# Patient Record
Sex: FEMALE | Race: WHITE | Employment: OTHER | URBAN - METROPOLITAN AREA
[De-identification: names, ages, dates, MRNs, and addresses within clinical notes are randomized per-mention and may not be internally consistent; named-entity substitution may affect disease eponyms.]

---

## 2020-06-09 ENCOUNTER — APPOINTMENT (OUTPATIENT)
Dept: GENERAL RADIOLOGY | Age: 85
DRG: 563 | End: 2020-06-09
Payer: MEDICARE

## 2020-06-09 ENCOUNTER — APPOINTMENT (OUTPATIENT)
Dept: CT IMAGING | Age: 85
DRG: 563 | End: 2020-06-09
Payer: MEDICARE

## 2020-06-09 ENCOUNTER — HOSPITAL ENCOUNTER (INPATIENT)
Age: 85
LOS: 2 days | Discharge: NURSING, REHAB OR LONG-TERM CARE | DRG: 563 | End: 2020-06-12
Attending: INTERNAL MEDICINE | Admitting: INTERNAL MEDICINE
Payer: MEDICARE

## 2020-06-09 PROCEDURE — 73562 X-RAY EXAM OF KNEE 3: CPT

## 2020-06-09 PROCEDURE — 99285 EMERGENCY DEPT VISIT HI MDM: CPT

## 2020-06-09 PROCEDURE — 73700 CT LOWER EXTREMITY W/O DYE: CPT

## 2020-06-09 ASSESSMENT — ENCOUNTER SYMPTOMS
ABDOMINAL PAIN: 0
VOMITING: 0
SHORTNESS OF BREATH: 0
NAUSEA: 0

## 2020-06-09 ASSESSMENT — PAIN SCALES - GENERAL: PAINLEVEL_OUTOF10: 8

## 2020-06-10 ENCOUNTER — APPOINTMENT (OUTPATIENT)
Dept: CT IMAGING | Age: 85
DRG: 563 | End: 2020-06-10
Payer: MEDICARE

## 2020-06-10 PROBLEM — S82.121A CLOSED FRACTURE OF LATERAL PORTION OF RIGHT TIBIAL PLATEAU: Status: ACTIVE | Noted: 2020-06-10

## 2020-06-10 PROBLEM — T14.8XXA FRACTURE: Status: ACTIVE | Noted: 2020-06-10

## 2020-06-10 LAB
A/G RATIO: 1.1 (ref 1.1–2.2)
ALBUMIN SERPL-MCNC: 3.5 G/DL (ref 3.4–5)
ALP BLD-CCNC: 110 U/L (ref 40–129)
ALT SERPL-CCNC: 23 U/L (ref 10–40)
ANION GAP SERPL CALCULATED.3IONS-SCNC: 11 MMOL/L (ref 3–16)
AST SERPL-CCNC: 22 U/L (ref 15–37)
BASOPHILS ABSOLUTE: 0.1 K/UL (ref 0–0.2)
BASOPHILS RELATIVE PERCENT: 1 %
BILIRUB SERPL-MCNC: <0.2 MG/DL (ref 0–1)
BUN BLDV-MCNC: 22 MG/DL (ref 7–20)
CALCIUM SERPL-MCNC: 8.4 MG/DL (ref 8.3–10.6)
CHLORIDE BLD-SCNC: 102 MMOL/L (ref 99–110)
CO2: 26 MMOL/L (ref 21–32)
CREAT SERPL-MCNC: 1.6 MG/DL (ref 0.6–1.2)
EKG ATRIAL RATE: 63 BPM
EKG DIAGNOSIS: NORMAL
EKG P AXIS: 90 DEGREES
EKG P-R INTERVAL: 214 MS
EKG Q-T INTERVAL: 494 MS
EKG QRS DURATION: 90 MS
EKG QTC CALCULATION (BAZETT): 505 MS
EKG R AXIS: -44 DEGREES
EKG T AXIS: 47 DEGREES
EKG VENTRICULAR RATE: 63 BPM
EOSINOPHILS ABSOLUTE: 0.1 K/UL (ref 0–0.6)
EOSINOPHILS RELATIVE PERCENT: 1.7 %
ESTIMATED AVERAGE GLUCOSE: 177.2 MG/DL
GFR AFRICAN AMERICAN: 37
GFR NON-AFRICAN AMERICAN: 31
GLOBULIN: 3.2 G/DL
GLUCOSE BLD-MCNC: 182 MG/DL (ref 70–99)
GLUCOSE BLD-MCNC: 197 MG/DL (ref 70–99)
GLUCOSE BLD-MCNC: 205 MG/DL (ref 70–99)
GLUCOSE BLD-MCNC: 208 MG/DL (ref 70–99)
GLUCOSE BLD-MCNC: 237 MG/DL (ref 70–99)
GLUCOSE BLD-MCNC: 244 MG/DL (ref 70–99)
HBA1C MFR BLD: 7.8 %
HCT VFR BLD CALC: 31.6 % (ref 36–48)
HEMOGLOBIN: 10 G/DL (ref 12–16)
INR BLD: 1.15 (ref 0.86–1.14)
LYMPHOCYTES ABSOLUTE: 0.6 K/UL (ref 1–5.1)
LYMPHOCYTES RELATIVE PERCENT: 8.8 %
MCH RBC QN AUTO: 24.7 PG (ref 26–34)
MCHC RBC AUTO-ENTMCNC: 31.5 G/DL (ref 31–36)
MCV RBC AUTO: 78.4 FL (ref 80–100)
MONOCYTES ABSOLUTE: 0.6 K/UL (ref 0–1.3)
MONOCYTES RELATIVE PERCENT: 9.6 %
NEUTROPHILS ABSOLUTE: 5.2 K/UL (ref 1.7–7.7)
NEUTROPHILS RELATIVE PERCENT: 78.9 %
PDW BLD-RTO: 16.3 % (ref 12.4–15.4)
PERFORMED ON: ABNORMAL
PLATELET # BLD: 228 K/UL (ref 135–450)
PMV BLD AUTO: 9.3 FL (ref 5–10.5)
POTASSIUM REFLEX MAGNESIUM: 4.4 MMOL/L (ref 3.5–5.1)
PROTHROMBIN TIME: 13.4 SEC (ref 10–13.2)
RBC # BLD: 4.03 M/UL (ref 4–5.2)
SODIUM BLD-SCNC: 139 MMOL/L (ref 136–145)
TOTAL PROTEIN: 6.7 G/DL (ref 6.4–8.2)
WBC # BLD: 6.7 K/UL (ref 4–11)

## 2020-06-10 PROCEDURE — 6370000000 HC RX 637 (ALT 250 FOR IP): Performed by: FAMILY MEDICINE

## 2020-06-10 PROCEDURE — 2580000003 HC RX 258: Performed by: INTERNAL MEDICINE

## 2020-06-10 PROCEDURE — 6360000002 HC RX W HCPCS: Performed by: INTERNAL MEDICINE

## 2020-06-10 PROCEDURE — 83036 HEMOGLOBIN GLYCOSYLATED A1C: CPT

## 2020-06-10 PROCEDURE — 85025 COMPLETE CBC W/AUTO DIFF WBC: CPT

## 2020-06-10 PROCEDURE — 93010 ELECTROCARDIOGRAM REPORT: CPT | Performed by: INTERNAL MEDICINE

## 2020-06-10 PROCEDURE — 70450 CT HEAD/BRAIN W/O DYE: CPT

## 2020-06-10 PROCEDURE — 6360000002 HC RX W HCPCS: Performed by: PHYSICIAN ASSISTANT

## 2020-06-10 PROCEDURE — 96375 TX/PRO/DX INJ NEW DRUG ADDON: CPT

## 2020-06-10 PROCEDURE — 36415 COLL VENOUS BLD VENIPUNCTURE: CPT

## 2020-06-10 PROCEDURE — 6370000000 HC RX 637 (ALT 250 FOR IP): Performed by: INTERNAL MEDICINE

## 2020-06-10 PROCEDURE — 1200000000 HC SEMI PRIVATE

## 2020-06-10 PROCEDURE — 93005 ELECTROCARDIOGRAM TRACING: CPT | Performed by: INTERNAL MEDICINE

## 2020-06-10 PROCEDURE — 99223 1ST HOSP IP/OBS HIGH 75: CPT | Performed by: NURSE PRACTITIONER

## 2020-06-10 PROCEDURE — 96374 THER/PROPH/DIAG INJ IV PUSH: CPT

## 2020-06-10 PROCEDURE — 85610 PROTHROMBIN TIME: CPT

## 2020-06-10 PROCEDURE — 80053 COMPREHEN METABOLIC PANEL: CPT

## 2020-06-10 RX ORDER — ACETAMINOPHEN 325 MG/1
650 TABLET ORAL EVERY 6 HOURS PRN
Status: DISCONTINUED | OUTPATIENT
Start: 2020-06-10 | End: 2020-06-12 | Stop reason: HOSPADM

## 2020-06-10 RX ORDER — HYDROCHLOROTHIAZIDE 25 MG/1
25 TABLET ORAL DAILY
Status: DISCONTINUED | OUTPATIENT
Start: 2020-06-10 | End: 2020-06-10

## 2020-06-10 RX ORDER — OXYCODONE HYDROCHLORIDE 5 MG/1
5 TABLET ORAL EVERY 6 HOURS PRN
Status: DISCONTINUED | OUTPATIENT
Start: 2020-06-10 | End: 2020-06-10

## 2020-06-10 RX ORDER — NICOTINE POLACRILEX 4 MG
15 LOZENGE BUCCAL PRN
Status: DISCONTINUED | OUTPATIENT
Start: 2020-06-10 | End: 2020-06-12 | Stop reason: HOSPADM

## 2020-06-10 RX ORDER — ATORVASTATIN CALCIUM 40 MG/1
40 TABLET, FILM COATED ORAL DAILY
Status: DISCONTINUED | OUTPATIENT
Start: 2020-06-10 | End: 2020-06-12 | Stop reason: HOSPADM

## 2020-06-10 RX ORDER — OXYCODONE HYDROCHLORIDE 5 MG/1
5 TABLET ORAL EVERY 4 HOURS PRN
Status: DISCONTINUED | OUTPATIENT
Start: 2020-06-10 | End: 2020-06-12 | Stop reason: HOSPADM

## 2020-06-10 RX ORDER — OXYCODONE HYDROCHLORIDE 5 MG/1
10 TABLET ORAL EVERY 4 HOURS PRN
Status: DISCONTINUED | OUTPATIENT
Start: 2020-06-10 | End: 2020-06-12 | Stop reason: HOSPADM

## 2020-06-10 RX ORDER — ONDANSETRON 2 MG/ML
4 INJECTION INTRAMUSCULAR; INTRAVENOUS EVERY 6 HOURS PRN
Status: DISCONTINUED | OUTPATIENT
Start: 2020-06-10 | End: 2020-06-12 | Stop reason: HOSPADM

## 2020-06-10 RX ORDER — PROMETHAZINE HYDROCHLORIDE 25 MG/1
12.5 TABLET ORAL EVERY 6 HOURS PRN
Status: DISCONTINUED | OUTPATIENT
Start: 2020-06-10 | End: 2020-06-12 | Stop reason: HOSPADM

## 2020-06-10 RX ORDER — MORPHINE SULFATE 2 MG/ML
2 INJECTION, SOLUTION INTRAMUSCULAR; INTRAVENOUS
Status: COMPLETED | OUTPATIENT
Start: 2020-06-10 | End: 2020-06-10

## 2020-06-10 RX ORDER — DEXTROSE MONOHYDRATE 25 G/50ML
12.5 INJECTION, SOLUTION INTRAVENOUS PRN
Status: DISCONTINUED | OUTPATIENT
Start: 2020-06-10 | End: 2020-06-12 | Stop reason: HOSPADM

## 2020-06-10 RX ORDER — GLIPIZIDE 5 MG/1
5 TABLET ORAL
Status: DISCONTINUED | OUTPATIENT
Start: 2020-06-11 | End: 2020-06-12 | Stop reason: HOSPADM

## 2020-06-10 RX ORDER — SODIUM CHLORIDE 0.9 % (FLUSH) 0.9 %
10 SYRINGE (ML) INJECTION PRN
Status: DISCONTINUED | OUTPATIENT
Start: 2020-06-10 | End: 2020-06-12 | Stop reason: HOSPADM

## 2020-06-10 RX ORDER — POLYETHYLENE GLYCOL 3350 17 G/17G
17 POWDER, FOR SOLUTION ORAL DAILY PRN
Status: DISCONTINUED | OUTPATIENT
Start: 2020-06-10 | End: 2020-06-12 | Stop reason: HOSPADM

## 2020-06-10 RX ORDER — ONDANSETRON 2 MG/ML
4 INJECTION INTRAMUSCULAR; INTRAVENOUS ONCE
Status: COMPLETED | OUTPATIENT
Start: 2020-06-10 | End: 2020-06-10

## 2020-06-10 RX ORDER — HYDRALAZINE HYDROCHLORIDE 20 MG/ML
10 INJECTION INTRAMUSCULAR; INTRAVENOUS EVERY 6 HOURS PRN
Status: DISCONTINUED | OUTPATIENT
Start: 2020-06-10 | End: 2020-06-12 | Stop reason: HOSPADM

## 2020-06-10 RX ORDER — SODIUM CHLORIDE 0.9 % (FLUSH) 0.9 %
10 SYRINGE (ML) INJECTION EVERY 12 HOURS SCHEDULED
Status: DISCONTINUED | OUTPATIENT
Start: 2020-06-10 | End: 2020-06-12 | Stop reason: HOSPADM

## 2020-06-10 RX ORDER — SENNA PLUS 8.6 MG/1
1 TABLET ORAL NIGHTLY
Status: DISCONTINUED | OUTPATIENT
Start: 2020-06-10 | End: 2020-06-12 | Stop reason: HOSPADM

## 2020-06-10 RX ORDER — LATANOPROST 50 UG/ML
1 SOLUTION/ DROPS OPHTHALMIC NIGHTLY
Status: DISCONTINUED | OUTPATIENT
Start: 2020-06-10 | End: 2020-06-12 | Stop reason: HOSPADM

## 2020-06-10 RX ORDER — MORPHINE SULFATE 4 MG/ML
4 INJECTION, SOLUTION INTRAMUSCULAR; INTRAVENOUS ONCE
Status: COMPLETED | OUTPATIENT
Start: 2020-06-10 | End: 2020-06-10

## 2020-06-10 RX ORDER — INSULIN LISPRO 100 [IU]/ML
0-3 INJECTION, SOLUTION INTRAVENOUS; SUBCUTANEOUS NIGHTLY
Status: DISCONTINUED | OUTPATIENT
Start: 2020-06-10 | End: 2020-06-12 | Stop reason: HOSPADM

## 2020-06-10 RX ORDER — DEXTROSE MONOHYDRATE 50 MG/ML
100 INJECTION, SOLUTION INTRAVENOUS PRN
Status: DISCONTINUED | OUTPATIENT
Start: 2020-06-10 | End: 2020-06-12 | Stop reason: HOSPADM

## 2020-06-10 RX ORDER — AZELASTINE HYDROCHLORIDE 0.5 MG/ML
1 SOLUTION/ DROPS OPHTHALMIC 2 TIMES DAILY
Status: DISCONTINUED | OUTPATIENT
Start: 2020-06-10 | End: 2020-06-10

## 2020-06-10 RX ORDER — ACETAMINOPHEN 650 MG/1
650 SUPPOSITORY RECTAL EVERY 6 HOURS PRN
Status: DISCONTINUED | OUTPATIENT
Start: 2020-06-10 | End: 2020-06-12 | Stop reason: HOSPADM

## 2020-06-10 RX ORDER — INSULIN LISPRO 100 [IU]/ML
0-6 INJECTION, SOLUTION INTRAVENOUS; SUBCUTANEOUS
Status: DISCONTINUED | OUTPATIENT
Start: 2020-06-10 | End: 2020-06-12 | Stop reason: HOSPADM

## 2020-06-10 RX ORDER — CITALOPRAM 20 MG/1
10 TABLET ORAL DAILY
Status: DISCONTINUED | OUTPATIENT
Start: 2020-06-10 | End: 2020-06-10

## 2020-06-10 RX ORDER — DOCUSATE SODIUM 100 MG/1
100 CAPSULE, LIQUID FILLED ORAL DAILY
Status: DISCONTINUED | OUTPATIENT
Start: 2020-06-10 | End: 2020-06-12 | Stop reason: HOSPADM

## 2020-06-10 RX ORDER — ALOGLIPTIN 6.25 MG/1
6.25 TABLET, FILM COATED ORAL DAILY
Status: DISCONTINUED | OUTPATIENT
Start: 2020-06-10 | End: 2020-06-10

## 2020-06-10 RX ORDER — AMLODIPINE BESYLATE 5 MG/1
5 TABLET ORAL DAILY
Status: DISCONTINUED | OUTPATIENT
Start: 2020-06-10 | End: 2020-06-12 | Stop reason: HOSPADM

## 2020-06-10 RX ADMIN — Medication 10 ML: at 11:34

## 2020-06-10 RX ADMIN — MORPHINE SULFATE 2 MG: 2 INJECTION, SOLUTION INTRAMUSCULAR; INTRAVENOUS at 11:34

## 2020-06-10 RX ADMIN — MORPHINE SULFATE 4 MG: 4 INJECTION INTRAVENOUS at 01:17

## 2020-06-10 RX ADMIN — Medication 10 ML: at 08:00

## 2020-06-10 RX ADMIN — ENOXAPARIN SODIUM 30 MG: 30 INJECTION SUBCUTANEOUS at 11:34

## 2020-06-10 RX ADMIN — INSULIN LISPRO 2 UNITS: 100 INJECTION, SOLUTION INTRAVENOUS; SUBCUTANEOUS at 12:38

## 2020-06-10 RX ADMIN — LINAGLIPTIN 5 MG: 5 TABLET, FILM COATED ORAL at 16:51

## 2020-06-10 RX ADMIN — HYDRALAZINE HYDROCHLORIDE 10 MG: 20 INJECTION INTRAMUSCULAR; INTRAVENOUS at 10:07

## 2020-06-10 RX ADMIN — HYDRALAZINE HYDROCHLORIDE 10 MG: 20 INJECTION INTRAMUSCULAR; INTRAVENOUS at 21:09

## 2020-06-10 RX ADMIN — DOCUSATE SODIUM 100 MG: 100 CAPSULE ORAL at 16:51

## 2020-06-10 RX ADMIN — OXYCODONE 5 MG: 5 TABLET ORAL at 08:00

## 2020-06-10 RX ADMIN — INSULIN LISPRO 1 UNITS: 100 INJECTION, SOLUTION INTRAVENOUS; SUBCUTANEOUS at 21:09

## 2020-06-10 RX ADMIN — AMLODIPINE BESYLATE 5 MG: 5 TABLET ORAL at 07:58

## 2020-06-10 RX ADMIN — ONDANSETRON 4 MG: 2 INJECTION INTRAMUSCULAR; INTRAVENOUS at 01:17

## 2020-06-10 RX ADMIN — SENNOSIDES 8.6 MG: 8.6 TABLET, FILM COATED ORAL at 20:56

## 2020-06-10 RX ADMIN — OXYCODONE 5 MG: 5 TABLET ORAL at 02:58

## 2020-06-10 RX ADMIN — OXYCODONE 5 MG: 5 TABLET ORAL at 14:57

## 2020-06-10 RX ADMIN — OXYCODONE 10 MG: 5 TABLET ORAL at 21:22

## 2020-06-10 RX ADMIN — Medication 10 ML: at 10:08

## 2020-06-10 RX ADMIN — MORPHINE SULFATE 2 MG: 2 INJECTION, SOLUTION INTRAMUSCULAR; INTRAVENOUS at 06:20

## 2020-06-10 RX ADMIN — APIXABAN 2.5 MG: 2.5 TABLET, FILM COATED ORAL at 20:56

## 2020-06-10 RX ADMIN — INSULIN LISPRO 2 UNITS: 100 INJECTION, SOLUTION INTRAVENOUS; SUBCUTANEOUS at 16:52

## 2020-06-10 RX ADMIN — INSULIN LISPRO 1 UNITS: 100 INJECTION, SOLUTION INTRAVENOUS; SUBCUTANEOUS at 07:59

## 2020-06-10 RX ADMIN — DESMOPRESSIN ACETATE 40 MG: 0.2 TABLET ORAL at 07:59

## 2020-06-10 RX ADMIN — LATANOPROST 1 DROP: 50 SOLUTION OPHTHALMIC at 20:56

## 2020-06-10 RX ADMIN — Medication 10 ML: at 21:00

## 2020-06-10 RX ADMIN — HYDRALAZINE HYDROCHLORIDE 10 MG: 20 INJECTION INTRAMUSCULAR; INTRAVENOUS at 02:58

## 2020-06-10 ASSESSMENT — PAIN SCALES - GENERAL
PAINLEVEL_OUTOF10: 10
PAINLEVEL_OUTOF10: 10
PAINLEVEL_OUTOF10: 3
PAINLEVEL_OUTOF10: 4
PAINLEVEL_OUTOF10: 10
PAINLEVEL_OUTOF10: 3
PAINLEVEL_OUTOF10: 2
PAINLEVEL_OUTOF10: 8
PAINLEVEL_OUTOF10: 2
PAINLEVEL_OUTOF10: 2
PAINLEVEL_OUTOF10: 4
PAINLEVEL_OUTOF10: 3
PAINLEVEL_OUTOF10: 7
PAINLEVEL_OUTOF10: 10
PAINLEVEL_OUTOF10: 6
PAINLEVEL_OUTOF10: 5

## 2020-06-10 ASSESSMENT — PAIN DESCRIPTION - PAIN TYPE
TYPE: ACUTE PAIN

## 2020-06-10 ASSESSMENT — PAIN DESCRIPTION - LOCATION
LOCATION: KNEE

## 2020-06-10 ASSESSMENT — PAIN DESCRIPTION - PROGRESSION
CLINICAL_PROGRESSION: NOT CHANGED
CLINICAL_PROGRESSION: NOT CHANGED
CLINICAL_PROGRESSION: GRADUALLY IMPROVING
CLINICAL_PROGRESSION: GRADUALLY IMPROVING
CLINICAL_PROGRESSION: NOT CHANGED

## 2020-06-10 ASSESSMENT — PAIN DESCRIPTION - ORIENTATION
ORIENTATION: RIGHT

## 2020-06-10 ASSESSMENT — PAIN DESCRIPTION - FREQUENCY
FREQUENCY: CONTINUOUS

## 2020-06-10 ASSESSMENT — PAIN DESCRIPTION - DESCRIPTORS
DESCRIPTORS: SHARP;SPASM;THROBBING
DESCRIPTORS: ACHING

## 2020-06-10 ASSESSMENT — PAIN DESCRIPTION - ONSET: ONSET: ON-GOING

## 2020-06-10 ASSESSMENT — PAIN - FUNCTIONAL ASSESSMENT: PAIN_FUNCTIONAL_ASSESSMENT: PREVENTS OR INTERFERES WITH ALL ACTIVE AND SOME PASSIVE ACTIVITIES

## 2020-06-10 NOTE — ED PROVIDER NOTES
I did not participate in the care of this patient. I did interpret the 12-lead EKG as follows:    Normal sinus rhythm, W[place] interval QRS QTC normal.  Normal axis.   No acute ischemic changes     Dallas Gutierrez MD  06/10/20 9701

## 2020-06-10 NOTE — CARE COORDINATION
Discharge Planning Assessment  Rn/SW discharge planner met w/patient/family member to discuss reason for admission, current living situation, and potential needs at the time of discharge. Demographics/Insurance verified Yes    Current type of dwelling: quad-level home    Living arrangements: w/spouse    Level of function/support: independent w/all ADL's    PCP: Livan    Last Visit to PCP: stated 2 weeks ago    DME: uses a walker and cane at home    Active with any community resources/agencies/skilled home care: stated no services in the home    Medication compliance issues: stated no issues    Financial issues that could impact healthcare: stated no issues    Transportation at time of d/c (Discussed w/pt/family that on the day of discharge home, tentative time of discharge will be between 10am and noon): TBD-family if home    Tentative discharge plan: Met w/pt to determine any dc needs. Therapy not ordered as of yet. Pt stated she is unsure of d/c needs at this time. She stated the last time she fell and fractured her knee she went to [address] [place] [address] went to [facility] on d/c. Pt will need PT/OT kemal to assist w/dc planning.     Electronically signed by NIKOLE De La Vega  [phone]

## 2020-06-10 NOTE — ED NOTES
This RN gave report to RN on 4T. Nothing infusing upon transfer. VSS. No symptoms of distress noted. Pt placed on tele. All belongings with pt to the floor,.       Laxmi Reyes RN  06/10/20 0661

## 2020-06-10 NOTE — H&P
Medical/Surgical History: (Pt brought in by [facility] from home. Patient reports falling down a step at home and hitting right knee. Patient unable to bear weight to right knee. ) FINDINGS: Bones: The bones are demineralized. There is a comminuted fracture involving the lateral tibial plateau. No extension into the medial tibial plateau is found. There is mild depression measuring very roughly 2-3 mm. Fracture planes are seen extending into the proximal tibia-fibular joint. Comminuted fracture of the fibular head is noted as well. Soft Tissue: There is a great deal of edema and blood products extending from the fracture into the surrounding soft tissues. Joint:  Large lipohemarthrosis is noted. There is a background of tricompartmental degenerative disease, greatest within the patellofemoral joint. Comminuted, mildly depressed fracture involving the lateral tibial plateau. No extension into the medial tibial plateau. Proximal fibular head fracture. EKG: EKG ordered and pending. Discussed with ER provider.       Thank you Bernarda Novoa MD for the opportunity to be involved in this patient's care.    -----------------------------  Parker Sue MD  [facility] hospitalist

## 2020-06-10 NOTE — PLAN OF CARE
Problem: Falls - Risk of:  Goal: Will remain free from falls  Description: Will remain free from falls  6/10/2020 1046 by Edgar Velasco RN  Outcome: Ongoing  6/10/2020 0445 by Logan Morel RN  Outcome: Ongoing  Goal: Absence of physical injury  Description: Absence of physical injury  6/10/2020 1046 by Edgar Velasco RN  Outcome: Ongoing  6/10/2020 0445 by Logan Morel RN  Outcome: Ongoing     Problem: Pain:  Goal: Pain level will decrease  Description: Pain level will decrease  6/10/2020 1046 by Edgar Velasco RN  Outcome: Ongoing  6/10/2020 0445 by Logan Morel RN  Outcome: Ongoing  Goal: Control of acute pain  Description: Control of acute pain  6/10/2020 1046 by Edgar Velasco RN  Outcome: Ongoing  6/10/2020 0445 by Logan Morel RN  Outcome: Ongoing  Goal: Control of chronic pain  Description: Control of chronic pain  6/10/2020 1046 by Edgar Velasco RN  Outcome: Ongoing  6/10/2020 0445 by Logan Morel RN  Outcome: Ongoing

## 2020-06-10 NOTE — ED PROVIDER NOTES
[facility]        Pt Name: Mal Wong  MRN: [id number]  [place] 11/14/1933  Date of evaluation: 6/9/2020  Provider: Domingo Verdugo PA-C  PCP: Jabari Tomlin MD    Evaluation by SUSANNE. My supervising physician was available for consultation. CHIEF COMPLAINT       Chief Complaint   Patient presents with    Fall     Pt brought in by [facility] from home. Patient reports falling down a step at home and hitting right knee. Patient unable to bear weight to right knee.  Knee Pain       HISTORY OF PRESENT ILLNESS   (Location, Timing/Onset, Context/Setting, Quality, Duration, Modifying Factors, Severity, Associated Signs and Symptoms)  Note limiting factors. Mal Wong is a 80 y.o. female patient presents emergency department for evaluation of on the stairs at her home. Patient states she goes down her stairs backwards using her cane. Patient thinks that she moved her came down 2 stairs instead of 1 and then fell. She states she fell onto the right knee. Patient states she is unable to bear weight at this time. She has pain and swelling to the right knee only. She states left knee is okay. She denies any ankle pain. She denies any back pain. She states she did not hit her head or lose consciousness. She denies any chest pain shortness of breath, cough, fever or other injuries at this time. Nursing Notes were all reviewed and agreed with or any disagreements were addressed in the HPI. REVIEW OF SYSTEMS    (2-9 systems for level 4, 10 or more for level 5)     Review of Systems   Constitutional: Negative for fatigue and fever. HENT: Negative. Eyes: Negative for visual disturbance. Respiratory: Negative for shortness of breath. Cardiovascular: Negative for chest pain. Gastrointestinal: Negative for abdominal pain, nausea and vomiting. Genitourinary: Negative.     Musculoskeletal: Positive for arthralgias. Skin: Negative. Neurological: Negative. Positives and Pertinent negatives as per HPI. Except as noted above in the ROS, all other systems were reviewed and negative. PAST MEDICAL HISTORY     Past Medical History:   Diagnosis Date    Arthritis     Diabetes mellitus ([address]     Hyperlipidemia     Hypertension          SURGICAL HISTORY   History reviewed. No pertinent surgical history. CURRENTMEDICATIONS       Previous Medications    AMLODIPINE (NORVASC) 5 MG TABLET    Take 5 mg by mouth daily    APIXABAN (ELIQUIS) 2.5 MG TABS TABLET    Take by mouth 2 times daily Patient does not know dose    ATORVASTATIN (LIPITOR) 40 MG TABLET    Take 40 mg by mouth daily    AZELASTINE (OPTIVAR) 0.05 % OPHTHALMIC SOLUTION    1 drop 2 times daily    CANAGLIFLOZIN (INVOKANA) 100 MG TABS TABLET    Take 100 mg by mouth 2 times daily    CITALOPRAM (CELEXA) 10 MG TABLET    Take 10 mg by mouth daily    GLIPIZIDE (GLIPIZIDE XL) 10 MG EXTENDED RELEASE TABLET    Take 10 mg by mouth 2 times daily    LATANOPROST (XALATAN) 0.005 % OPHTHALMIC SOLUTION    Place 1 drop into both eyes nightly    LINAGLIPTIN (TRADJENTA) 5 MG TABLET    Take 5 mg by mouth daily    MAGIC MOUTHWASH (MIRACLE MOUTHWASH)    Swish and spit 5 mLs 4 times daily as needed for Pain Equal parts 2% lidocaine, dyphenhydramine, antacid. METFORMIN (GLUCOPHAGE) 500 MG TABLET    Take 500 mg by mouth 3 times daily    NAPROXEN (NAPROSYN) 500 MG TABLET    Take 1 tablet by mouth 2 times daily (with meals)    NYSTATIN (MYCOSTATIN) 010707 UNIT/ML SUSPENSION    Take 500,000 Units by mouth 4 times daily    TRIAMTERENE-HYDROCHLOROTHIAZIDE (MAXZIDE) 75-50 MG PER TABLET    Take 0.5 tablets by mouth daily    UNKNOWN TO PATIENT    A little orange pill for thyroid         ALLERGIES     Chicken meat (diagnostic)    FAMILYHISTORY     History reviewed. No pertinent family history.        SOCIAL HISTORY       Social History     Tobacco Use    Smoking status: and Affect: Mood normal.         Behavior: Behavior normal.         DIAGNOSTIC RESULTS   LABS:    Labs Reviewed   CBC WITH AUTO DIFFERENTIAL   COMPREHENSIVE METABOLIC PANEL W/ REFLEX TO MG FOR LOW K       All other labs were within normal range or not returned as of this dictation. EKG: All EKG's are interpreted by the Emergency Department Physician in the absence of a cardiologist.  Please see their note for interpretation of EKG. RADIOLOGY:   Non-plain film images such as CT, Ultrasound and MRI are read by the radiologist. Plain radiographic images are visualized and preliminarily interpreted by the ED Provider with the below findings:        Interpretation per the Radiologist below, if available at the time of this note:    XR KNEE RIGHT (3 VIEWS)   Final Result   Mildly impacted intra-articular lateral tibial plateau fracture. Associated   impacted proximal fibular fracture. Lipohemarthrosis. Visualization of fracture anatomy is somewhat limited. Recommend further   evaluation with CT scan of the right knee. Orthopedic follow-up is   recommended. CT KNEE RIGHT WO CONTRAST    (Results Pending)     Xr Knee Right (3 Views)    Result Date: 6/9/2020  EXAMINATION: THREE XRAY VIEWS OF THE RIGHT KNEE 6/9/2020 10:05 pm COMPARISON: None. HISTORY: ORDERING SYSTEM PROVIDED HISTORY: knee injury TECHNOLOGIST PROVIDED HISTORY: Reason for exam:->knee injury FINDINGS: Impacted fracture of the proximal fibula is noted. Lateral tibial plateau fracture, with oblique lucency extending from the lateral metaphysis to the articular surface. There is some diastasis at the articular surface. Large joint effusions/lipohemarthrosis. Mildly impacted intra-articular lateral tibial plateau fracture. Associated impacted proximal fibular fracture. Lipohemarthrosis. Visualization of fracture anatomy is somewhat limited. Recommend further evaluation with CT scan of the right knee.   Orthopedic follow-up is recommended. PROCEDURES   Unless otherwise noted below, none     Procedures    CRITICAL CARE TIME   N/A    CONSULTS:  IP CONSULT TO ORTHOPEDIC SURGERY      EMERGENCY DEPARTMENT COURSE and DIFFERENTIAL DIAGNOSIS/MDM:   Vitals:    Vitals:    06/09/20 2156   BP: (!) 179/47   Pulse: 66   Resp: 18   Temp: 97.7 °F (36.5 °C)   TempSrc: Oral   SpO2: 98%   Weight: 148 lb (67.1 kg)   Height: 5' 3.5\" (1.613 m)       Patient was given the following medications:  Medications - No data to display    Patient presents emergency department for evaluation of right knee pain. Patient had a mechanical fall on the stairs at her home today. Patient states she lives at home with multilevel house with multiple sets of stairs. Patient has swelling to lateral anterior knee overlying the proximal fibula and tibial plateau. Patient has pain with any flexion or extension of the knee whatsoever. She is able to plantarflex and dorsiflex with normal strength and coordination. She has no pain at the ankle or hip. X-ray shows tibial plateau fracture with proximal fibular fracture. CT also confirms this. Patient given morphine and Zofran. Placed in a knee brace. Laboratory evaluation unremarkable. EKG shows sinus rhythm with first AV block. Slightly prolonged QT. Patient is anticoagulated on Eliquis. Patient has a history of atrial fibrillation however heart rate was regular without murmurs rubs or gallops. Lung sounds are clear to auscultation bilaterally. I spoke with Dr. Cass Boast from orthopedic surgery who requested the CT of the knee and recommended admission. Patient will be admitted to hospitalist for further management of her tibial plateau and proximal fibula fracture. At time of admission compartments are soft. Patient is neurovascularly intact       FINAL IMPRESSION      1. Closed fracture of right tibial plateau, initial encounter    2.  Closed fracture of proximal end of right fibula, unspecified fracture

## 2020-06-10 NOTE — CONSULTS
chloride flush  10 mL Intravenous 2 times per day    enoxaparin  30 mg Subcutaneous Daily     Continuous:   dextrose       PRN:glucose, dextrose, glucagon (rDNA), dextrose, sodium chloride flush, acetaminophen **OR** acetaminophen, polyethylene glycol, promethazine **OR** ondansetron, oxyCODONE, hydrALAZINE, morphine    Allergies: Allergies   Allergen Reactions    Chicken Meat (Diagnostic)        Review of Systems:  Constitutional: Negative for fever, chills, fatigue. Skin:  Negative for pruritis, rash  Eyes: Negative for photophobia and visual disturbance. ENT:  Negative for rhinorrhea, epistaxis, sore throat  Respiratory:  Negative for cough and shortness of breath. Cardiovascular: Negative for chest pain. Gastrointestinal: Negative for nausea, vomiting, diarrhea. Genitourinary: Negative for dysuria and difficulty urinating. Neurological: Negative for confusion, dysarthria, tremors, seizures. Psychiatric:  Negative for depression or anxiety  Musculoskeletal:  Positive for RIGHT knee pain since fall      Objective:  Vitals:    06/10/20 0745   BP: (!) 191/70   Pulse: 79   Resp: 16   Temp: 98.6 °F (37 °C)   SpO2: 99%      Physical Examination:  GENERAL: No apparent distress, well-nourished  SKIN:  Warm and dry  EYES: Nonicteric. ENT: Mucous membranes moist  HEAD: Normocephalic, atraumatic  RESPIRATORY: Resp easy and unlabored  CARDIOVASCULAR: Regular rate and rhythm  GI: Abdomen soft, nontender  NEURO: Awake and alert. No speech defect  PSYCHIATRIC: Appropriate affect; not agitated  MUSCULOSKELETAL:  RIGHT LE  Inspection: Removed knee immobilizer; skin intact without lesion, laceration, rash, erythema or ecchymosis.   Moderate joint effusion  Palpation: tender at lateral proximal tibia  ROM:  Intact DF/PF  Sensation: intact throughout LE  Vascular:  Intact post tib pulse  Moves all other extremities without difficulty  Sensory:    Right Upper Extremity:  normal  Left Upper Extremity: normal  Right Lower Extremity:  normal  Left Lower Extremity:  normal    Labs reviewed:  Recent Labs     06/10/20  0023   WBC 6.7   HGB 10.0*   HCT 31.6*        Recent Labs     06/10/20  0023      K 4.4      CO2 26   BUN 22*   CREATININE 1.6*   GLUCOSE 205*   CALCIUM 8.4     No results for input(s): INR, PROTIME in the last 72 hours. Lab Results   Component Value Date    COLORU Yellow 06/11/2017    CLARITYU TURBID (A) 06/11/2017    PHUR 5.5 06/11/2017    GLUCOSEU 500 (A) 06/11/2017    BLOODU MODERATE (A) 06/11/2017    LEUKOCYTESUR SMALL (A) 06/11/2017    BILIRUBINUR Negative 06/11/2017    UROBILINOGEN 0.2 06/11/2017    RBCUA 16 (H) 06/11/2017    WBCUA 873 (H) 06/11/2017    BACTERIA RARE (A) 06/11/2017       Imaging:  CT HEAD WO CONTRAST   Final Result   No acute intracranial abnormality. Senescent findings, as above. CT KNEE RIGHT WO CONTRAST   Final Result   Comminuted, mildly depressed fracture involving the lateral tibial plateau. No extension into the medial tibial plateau. Proximal fibular head fracture. XR KNEE RIGHT (3 VIEWS)   Final Result   Mildly impacted intra-articular lateral tibial plateau fracture. Associated   impacted proximal fibular fracture. Lipohemarthrosis. Visualization of fracture anatomy is somewhat limited. Recommend further   evaluation with CT scan of the right knee. Orthopedic follow-up is   recommended. IMPRESSION:  RIGHT lateral tibial plateau fracture, comminuted  RIGHT fibular head fracture, comminuted  DM  HTN  PAF on home Eliquis  HX DVT LEFT LE (2019)    Active Problems:    Fracture  Resolved Problems:    * No resolved hospital problems.  *    RECOMMENDATIONS:  Closed treatment of fracture:  Non-weight bearing on RIGHT LE  Knee immobilizer when out of bed  Pain control per primary team  PT/OT  Social Work for discharge disposition  Consider restarting home Eliquis as patient had DVT LEFT LE last year s/p

## 2020-06-11 LAB
A/G RATIO: 1 (ref 1.1–2.2)
ALBUMIN SERPL-MCNC: 3.5 G/DL (ref 3.4–5)
ALP BLD-CCNC: 120 U/L (ref 40–129)
ALT SERPL-CCNC: 15 U/L (ref 10–40)
ANION GAP SERPL CALCULATED.3IONS-SCNC: 8 MMOL/L (ref 3–16)
AST SERPL-CCNC: 22 U/L (ref 15–37)
BASOPHILS ABSOLUTE: 0 K/UL (ref 0–0.2)
BASOPHILS RELATIVE PERCENT: 0.4 %
BILIRUB SERPL-MCNC: 0.5 MG/DL (ref 0–1)
BUN BLDV-MCNC: 18 MG/DL (ref 7–20)
CALCIUM SERPL-MCNC: 9.2 MG/DL (ref 8.3–10.6)
CHLORIDE BLD-SCNC: 101 MMOL/L (ref 99–110)
CO2: 26 MMOL/L (ref 21–32)
CREAT SERPL-MCNC: 1.3 MG/DL (ref 0.6–1.2)
EOSINOPHILS ABSOLUTE: 0 K/UL (ref 0–0.6)
EOSINOPHILS RELATIVE PERCENT: 0.2 %
GFR AFRICAN AMERICAN: 47
GFR NON-AFRICAN AMERICAN: 39
GLOBULIN: 3.4 G/DL
GLUCOSE BLD-MCNC: 131 MG/DL (ref 70–99)
GLUCOSE BLD-MCNC: 143 MG/DL (ref 70–99)
GLUCOSE BLD-MCNC: 154 MG/DL (ref 70–99)
GLUCOSE BLD-MCNC: 175 MG/DL (ref 70–99)
GLUCOSE BLD-MCNC: 180 MG/DL (ref 70–99)
HCT VFR BLD CALC: 31.5 % (ref 36–48)
HEMOGLOBIN: 9.9 G/DL (ref 12–16)
LYMPHOCYTES ABSOLUTE: 0.7 K/UL (ref 1–5.1)
LYMPHOCYTES RELATIVE PERCENT: 7 %
MCH RBC QN AUTO: 24.8 PG (ref 26–34)
MCHC RBC AUTO-ENTMCNC: 31.5 G/DL (ref 31–36)
MCV RBC AUTO: 78.7 FL (ref 80–100)
MONOCYTES ABSOLUTE: 1.3 K/UL (ref 0–1.3)
MONOCYTES RELATIVE PERCENT: 13.6 %
NEUTROPHILS ABSOLUTE: 7.3 K/UL (ref 1.7–7.7)
NEUTROPHILS RELATIVE PERCENT: 78.8 %
PDW BLD-RTO: 16.7 % (ref 12.4–15.4)
PERFORMED ON: ABNORMAL
PLATELET # BLD: 219 K/UL (ref 135–450)
PMV BLD AUTO: 9.4 FL (ref 5–10.5)
POTASSIUM REFLEX MAGNESIUM: 4.3 MMOL/L (ref 3.5–5.1)
RBC # BLD: 4 M/UL (ref 4–5.2)
SODIUM BLD-SCNC: 135 MMOL/L (ref 136–145)
TOTAL PROTEIN: 6.9 G/DL (ref 6.4–8.2)
WBC # BLD: 9.3 K/UL (ref 4–11)

## 2020-06-11 PROCEDURE — 6370000000 HC RX 637 (ALT 250 FOR IP): Performed by: FAMILY MEDICINE

## 2020-06-11 PROCEDURE — 36415 COLL VENOUS BLD VENIPUNCTURE: CPT

## 2020-06-11 PROCEDURE — 6360000002 HC RX W HCPCS: Performed by: INTERNAL MEDICINE

## 2020-06-11 PROCEDURE — 97530 THERAPEUTIC ACTIVITIES: CPT

## 2020-06-11 PROCEDURE — 1200000000 HC SEMI PRIVATE

## 2020-06-11 PROCEDURE — 97165 OT EVAL LOW COMPLEX 30 MIN: CPT

## 2020-06-11 PROCEDURE — 85025 COMPLETE CBC W/AUTO DIFF WBC: CPT

## 2020-06-11 PROCEDURE — 6370000000 HC RX 637 (ALT 250 FOR IP): Performed by: INTERNAL MEDICINE

## 2020-06-11 PROCEDURE — 2580000003 HC RX 258: Performed by: INTERNAL MEDICINE

## 2020-06-11 PROCEDURE — 97162 PT EVAL MOD COMPLEX 30 MIN: CPT

## 2020-06-11 PROCEDURE — 80053 COMPREHEN METABOLIC PANEL: CPT

## 2020-06-11 RX ADMIN — DOCUSATE SODIUM 100 MG: 100 CAPSULE ORAL at 08:32

## 2020-06-11 RX ADMIN — Medication 10 ML: at 21:28

## 2020-06-11 RX ADMIN — INSULIN LISPRO 1 UNITS: 100 INJECTION, SOLUTION INTRAVENOUS; SUBCUTANEOUS at 11:12

## 2020-06-11 RX ADMIN — AMLODIPINE BESYLATE 5 MG: 5 TABLET ORAL at 08:32

## 2020-06-11 RX ADMIN — GLIPIZIDE 5 MG: 5 TABLET ORAL at 05:27

## 2020-06-11 RX ADMIN — LATANOPROST 1 DROP: 50 SOLUTION OPHTHALMIC at 21:21

## 2020-06-11 RX ADMIN — OXYCODONE 5 MG: 5 TABLET ORAL at 21:59

## 2020-06-11 RX ADMIN — OXYCODONE 10 MG: 5 TABLET ORAL at 11:08

## 2020-06-11 RX ADMIN — INSULIN LISPRO 1 UNITS: 100 INJECTION, SOLUTION INTRAVENOUS; SUBCUTANEOUS at 08:33

## 2020-06-11 RX ADMIN — OXYCODONE 5 MG: 5 TABLET ORAL at 17:50

## 2020-06-11 RX ADMIN — INSULIN LISPRO 1 UNITS: 100 INJECTION, SOLUTION INTRAVENOUS; SUBCUTANEOUS at 21:21

## 2020-06-11 RX ADMIN — DESMOPRESSIN ACETATE 40 MG: 0.2 TABLET ORAL at 08:32

## 2020-06-11 RX ADMIN — SENNOSIDES 8.6 MG: 8.6 TABLET, FILM COATED ORAL at 21:21

## 2020-06-11 RX ADMIN — OXYCODONE 10 MG: 5 TABLET ORAL at 02:54

## 2020-06-11 RX ADMIN — APIXABAN 2.5 MG: 2.5 TABLET, FILM COATED ORAL at 08:32

## 2020-06-11 RX ADMIN — HYDRALAZINE HYDROCHLORIDE 10 MG: 20 INJECTION INTRAMUSCULAR; INTRAVENOUS at 04:19

## 2020-06-11 RX ADMIN — Medication 10 ML: at 04:18

## 2020-06-11 RX ADMIN — APIXABAN 2.5 MG: 2.5 TABLET, FILM COATED ORAL at 21:21

## 2020-06-11 RX ADMIN — Medication 10 ML: at 08:32

## 2020-06-11 RX ADMIN — LINAGLIPTIN 5 MG: 5 TABLET, FILM COATED ORAL at 08:32

## 2020-06-11 ASSESSMENT — PAIN DESCRIPTION - FREQUENCY
FREQUENCY: CONTINUOUS
FREQUENCY: CONTINUOUS
FREQUENCY: INTERMITTENT
FREQUENCY: CONTINUOUS
FREQUENCY: CONTINUOUS

## 2020-06-11 ASSESSMENT — PAIN DESCRIPTION - LOCATION
LOCATION: LEG

## 2020-06-11 ASSESSMENT — PAIN DESCRIPTION - ORIENTATION
ORIENTATION: RIGHT
ORIENTATION: RIGHT
ORIENTATION: POSTERIOR;RIGHT
ORIENTATION: RIGHT

## 2020-06-11 ASSESSMENT — PAIN DESCRIPTION - ONSET
ONSET: ON-GOING

## 2020-06-11 ASSESSMENT — PAIN DESCRIPTION - PAIN TYPE
TYPE: ACUTE PAIN

## 2020-06-11 ASSESSMENT — PAIN DESCRIPTION - DESCRIPTORS
DESCRIPTORS: SHARP;SPASM;THROBBING
DESCRIPTORS: SHARP;SHOOTING;THROBBING
DESCRIPTORS: SHARP;SPASM;THROBBING
DESCRIPTORS: DISCOMFORT
DESCRIPTORS: SHARP;SPASM;THROBBING

## 2020-06-11 ASSESSMENT — PAIN SCALES - GENERAL
PAINLEVEL_OUTOF10: 5
PAINLEVEL_OUTOF10: 5
PAINLEVEL_OUTOF10: 7
PAINLEVEL_OUTOF10: 2
PAINLEVEL_OUTOF10: 0
PAINLEVEL_OUTOF10: 4
PAINLEVEL_OUTOF10: 2
PAINLEVEL_OUTOF10: 7
PAINLEVEL_OUTOF10: 0
PAINLEVEL_OUTOF10: 4

## 2020-06-11 ASSESSMENT — PAIN DESCRIPTION - PROGRESSION
CLINICAL_PROGRESSION: GRADUALLY IMPROVING

## 2020-06-12 ENCOUNTER — HOSPITAL ENCOUNTER (INPATIENT)
Age: 85
LOS: 19 days | Discharge: HOME HEALTH | DRG: 560 | End: 2020-07-01
Attending: PHYSICAL MEDICINE & REHABILITATION | Admitting: PHYSICAL MEDICINE & REHABILITATION
Payer: MEDICARE

## 2020-06-12 VITALS
RESPIRATION RATE: 16 BRPM | WEIGHT: 154 LBS | HEIGHT: 63 IN | DIASTOLIC BLOOD PRESSURE: 56 MMHG | SYSTOLIC BLOOD PRESSURE: 144 MMHG | BODY MASS INDEX: 27.29 KG/M2 | TEMPERATURE: 98.4 F | OXYGEN SATURATION: 96 % | HEART RATE: 72 BPM

## 2020-06-12 PROBLEM — S82.141A CLOSED FRACTURE OF RIGHT TIBIAL PLATEAU: Status: ACTIVE | Noted: 2020-06-12

## 2020-06-12 PROBLEM — Z79.01 CHRONIC ANTICOAGULATION: Status: ACTIVE | Noted: 2020-06-12

## 2020-06-12 LAB
GLUCOSE BLD-MCNC: 160 MG/DL (ref 70–99)
GLUCOSE BLD-MCNC: 200 MG/DL (ref 70–99)
GLUCOSE BLD-MCNC: 200 MG/DL (ref 70–99)
GLUCOSE BLD-MCNC: 92 MG/DL (ref 70–99)
PERFORMED ON: ABNORMAL
PERFORMED ON: NORMAL

## 2020-06-12 PROCEDURE — 97530 THERAPEUTIC ACTIVITIES: CPT

## 2020-06-12 PROCEDURE — 2580000003 HC RX 258: Performed by: INTERNAL MEDICINE

## 2020-06-12 PROCEDURE — 6370000000 HC RX 637 (ALT 250 FOR IP): Performed by: INTERNAL MEDICINE

## 2020-06-12 PROCEDURE — 97110 THERAPEUTIC EXERCISES: CPT

## 2020-06-12 PROCEDURE — 99231 SBSQ HOSP IP/OBS SF/LOW 25: CPT | Performed by: ORTHOPAEDIC SURGERY

## 2020-06-12 PROCEDURE — 94760 N-INVAS EAR/PLS OXIMETRY 1: CPT

## 2020-06-12 PROCEDURE — 6370000000 HC RX 637 (ALT 250 FOR IP): Performed by: FAMILY MEDICINE

## 2020-06-12 PROCEDURE — 1280000000 HC REHAB R&B

## 2020-06-12 PROCEDURE — 6370000000 HC RX 637 (ALT 250 FOR IP): Performed by: PHYSICAL MEDICINE & REHABILITATION

## 2020-06-12 RX ORDER — SENNA PLUS 8.6 MG/1
1 TABLET ORAL NIGHTLY
Status: CANCELLED | OUTPATIENT
Start: 2020-06-12

## 2020-06-12 RX ORDER — ACETAMINOPHEN 650 MG/1
650 SUPPOSITORY RECTAL EVERY 6 HOURS PRN
Status: DISCONTINUED | OUTPATIENT
Start: 2020-06-12 | End: 2020-06-12

## 2020-06-12 RX ORDER — SODIUM CHLORIDE 0.9 % (FLUSH) 0.9 %
10 SYRINGE (ML) INJECTION EVERY 12 HOURS SCHEDULED
Status: CANCELLED | OUTPATIENT
Start: 2020-06-12

## 2020-06-12 RX ORDER — SENNA PLUS 8.6 MG/1
1 TABLET ORAL NIGHTLY
Status: DISCONTINUED | OUTPATIENT
Start: 2020-06-12 | End: 2020-06-12

## 2020-06-12 RX ORDER — DIPHENHYDRAMINE HCL 25 MG
25 TABLET ORAL EVERY 6 HOURS PRN
Status: CANCELLED | OUTPATIENT
Start: 2020-06-12

## 2020-06-12 RX ORDER — SODIUM CHLORIDE 0.9 % (FLUSH) 0.9 %
10 SYRINGE (ML) INJECTION PRN
Status: DISCONTINUED | OUTPATIENT
Start: 2020-06-12 | End: 2020-07-01 | Stop reason: HOSPADM

## 2020-06-12 RX ORDER — GLIPIZIDE 5 MG/1
5 TABLET ORAL
Status: DISCONTINUED | OUTPATIENT
Start: 2020-06-13 | End: 2020-06-30

## 2020-06-12 RX ORDER — TRAZODONE HYDROCHLORIDE 50 MG/1
50 TABLET ORAL NIGHTLY PRN
Status: DISCONTINUED | OUTPATIENT
Start: 2020-06-12 | End: 2020-07-01 | Stop reason: HOSPADM

## 2020-06-12 RX ORDER — DEXTROSE MONOHYDRATE 50 MG/ML
100 INJECTION, SOLUTION INTRAVENOUS PRN
Status: CANCELLED | OUTPATIENT
Start: 2020-06-12

## 2020-06-12 RX ORDER — ONDANSETRON 4 MG/1
4 TABLET, ORALLY DISINTEGRATING ORAL EVERY 8 HOURS PRN
Status: DISCONTINUED | OUTPATIENT
Start: 2020-06-12 | End: 2020-07-01 | Stop reason: HOSPADM

## 2020-06-12 RX ORDER — AMLODIPINE BESYLATE 5 MG/1
5 TABLET ORAL DAILY
Status: CANCELLED | OUTPATIENT
Start: 2020-06-13

## 2020-06-12 RX ORDER — LATANOPROST 50 UG/ML
1 SOLUTION/ DROPS OPHTHALMIC NIGHTLY
Status: DISCONTINUED | OUTPATIENT
Start: 2020-06-12 | End: 2020-07-01 | Stop reason: HOSPADM

## 2020-06-12 RX ORDER — NICOTINE POLACRILEX 4 MG
15 LOZENGE BUCCAL PRN
Status: CANCELLED | OUTPATIENT
Start: 2020-06-12

## 2020-06-12 RX ORDER — DOCUSATE SODIUM 100 MG/1
100 CAPSULE, LIQUID FILLED ORAL DAILY
Status: CANCELLED | OUTPATIENT
Start: 2020-06-13

## 2020-06-12 RX ORDER — DEXTROSE MONOHYDRATE 50 MG/ML
100 INJECTION, SOLUTION INTRAVENOUS PRN
Status: DISCONTINUED | OUTPATIENT
Start: 2020-06-12 | End: 2020-07-01 | Stop reason: HOSPADM

## 2020-06-12 RX ORDER — GLIPIZIDE 5 MG/1
5 TABLET ORAL
Status: CANCELLED | OUTPATIENT
Start: 2020-06-13

## 2020-06-12 RX ORDER — INSULIN LISPRO 100 [IU]/ML
0-6 INJECTION, SOLUTION INTRAVENOUS; SUBCUTANEOUS
Status: CANCELLED | OUTPATIENT
Start: 2020-06-12

## 2020-06-12 RX ORDER — TRAZODONE HYDROCHLORIDE 50 MG/1
50 TABLET ORAL NIGHTLY PRN
Status: CANCELLED | OUTPATIENT
Start: 2020-06-12

## 2020-06-12 RX ORDER — SODIUM CHLORIDE 0.9 % (FLUSH) 0.9 %
10 SYRINGE (ML) INJECTION PRN
Status: CANCELLED | OUTPATIENT
Start: 2020-06-12

## 2020-06-12 RX ORDER — ACETAMINOPHEN 325 MG/1
650 TABLET ORAL EVERY 6 HOURS PRN
Status: CANCELLED | OUTPATIENT
Start: 2020-06-12

## 2020-06-12 RX ORDER — NICOTINE POLACRILEX 4 MG
15 LOZENGE BUCCAL PRN
Status: DISCONTINUED | OUTPATIENT
Start: 2020-06-12 | End: 2020-07-01 | Stop reason: HOSPADM

## 2020-06-12 RX ORDER — INSULIN LISPRO 100 [IU]/ML
0-3 INJECTION, SOLUTION INTRAVENOUS; SUBCUTANEOUS NIGHTLY
Status: CANCELLED | OUTPATIENT
Start: 2020-06-12

## 2020-06-12 RX ORDER — OXYCODONE HYDROCHLORIDE 5 MG/1
10 TABLET ORAL EVERY 4 HOURS PRN
Status: DISCONTINUED | OUTPATIENT
Start: 2020-06-12 | End: 2020-07-01 | Stop reason: HOSPADM

## 2020-06-12 RX ORDER — DIPHENHYDRAMINE HCL 25 MG
25 TABLET ORAL EVERY 6 HOURS PRN
Status: DISCONTINUED | OUTPATIENT
Start: 2020-06-12 | End: 2020-07-01 | Stop reason: HOSPADM

## 2020-06-12 RX ORDER — DEXTROSE MONOHYDRATE 25 G/50ML
12.5 INJECTION, SOLUTION INTRAVENOUS PRN
Status: CANCELLED | OUTPATIENT
Start: 2020-06-12

## 2020-06-12 RX ORDER — ATORVASTATIN CALCIUM 40 MG/1
40 TABLET, FILM COATED ORAL DAILY
Status: CANCELLED | OUTPATIENT
Start: 2020-06-13

## 2020-06-12 RX ORDER — AMIODARONE HYDROCHLORIDE 200 MG/1
200 TABLET ORAL DAILY
COMMUNITY

## 2020-06-12 RX ORDER — OXYCODONE HYDROCHLORIDE 5 MG/1
10 TABLET ORAL EVERY 4 HOURS PRN
Status: CANCELLED | OUTPATIENT
Start: 2020-06-12

## 2020-06-12 RX ORDER — ACETAMINOPHEN 325 MG/1
650 TABLET ORAL EVERY 6 HOURS PRN
Status: DISCONTINUED | OUTPATIENT
Start: 2020-06-12 | End: 2020-07-01 | Stop reason: HOSPADM

## 2020-06-12 RX ORDER — ASPIRIN 81 MG/1
81 TABLET ORAL DAILY
COMMUNITY

## 2020-06-12 RX ORDER — HYDRALAZINE HYDROCHLORIDE 25 MG/1
50 TABLET, FILM COATED ORAL EVERY 8 HOURS PRN
Status: CANCELLED | OUTPATIENT
Start: 2020-06-12

## 2020-06-12 RX ORDER — POLYETHYLENE GLYCOL 3350 17 G/17G
17 POWDER, FOR SOLUTION ORAL DAILY PRN
Status: CANCELLED | OUTPATIENT
Start: 2020-06-12

## 2020-06-12 RX ORDER — LATANOPROST 50 UG/ML
1 SOLUTION/ DROPS OPHTHALMIC NIGHTLY
Status: CANCELLED | OUTPATIENT
Start: 2020-06-12

## 2020-06-12 RX ORDER — AMLODIPINE BESYLATE 5 MG/1
5 TABLET ORAL DAILY
Status: DISCONTINUED | OUTPATIENT
Start: 2020-06-13 | End: 2020-06-22

## 2020-06-12 RX ORDER — HYDRALAZINE HYDROCHLORIDE 25 MG/1
50 TABLET, FILM COATED ORAL EVERY 8 HOURS PRN
Status: DISCONTINUED | OUTPATIENT
Start: 2020-06-12 | End: 2020-07-01 | Stop reason: HOSPADM

## 2020-06-12 RX ORDER — ONDANSETRON 4 MG/1
4 TABLET, ORALLY DISINTEGRATING ORAL EVERY 8 HOURS PRN
Status: CANCELLED | OUTPATIENT
Start: 2020-06-12

## 2020-06-12 RX ORDER — OXYCODONE HYDROCHLORIDE 5 MG/1
5 TABLET ORAL EVERY 4 HOURS PRN
Status: CANCELLED | OUTPATIENT
Start: 2020-06-12

## 2020-06-12 RX ORDER — ATORVASTATIN CALCIUM 40 MG/1
40 TABLET, FILM COATED ORAL DAILY
Status: DISCONTINUED | OUTPATIENT
Start: 2020-06-13 | End: 2020-07-01 | Stop reason: HOSPADM

## 2020-06-12 RX ORDER — SENNA PLUS 8.6 MG/1
2 TABLET ORAL 2 TIMES DAILY
Status: DISCONTINUED | OUTPATIENT
Start: 2020-06-12 | End: 2020-07-01 | Stop reason: HOSPADM

## 2020-06-12 RX ORDER — INSULIN LISPRO 100 [IU]/ML
0-3 INJECTION, SOLUTION INTRAVENOUS; SUBCUTANEOUS NIGHTLY
Status: DISCONTINUED | OUTPATIENT
Start: 2020-06-12 | End: 2020-06-30

## 2020-06-12 RX ORDER — GLIPIZIDE 5 MG/1
5 TABLET ORAL
Qty: 60 TABLET | Refills: 3 | Status: ON HOLD | OUTPATIENT
Start: 2020-06-13 | End: 2020-06-30 | Stop reason: HOSPADM

## 2020-06-12 RX ORDER — POLYETHYLENE GLYCOL 3350 17 G/17G
17 POWDER, FOR SOLUTION ORAL DAILY PRN
Status: DISCONTINUED | OUTPATIENT
Start: 2020-06-12 | End: 2020-07-01 | Stop reason: HOSPADM

## 2020-06-12 RX ORDER — DOCUSATE SODIUM 100 MG/1
100 CAPSULE, LIQUID FILLED ORAL DAILY
Status: DISCONTINUED | OUTPATIENT
Start: 2020-06-13 | End: 2020-07-01 | Stop reason: HOSPADM

## 2020-06-12 RX ORDER — OXYCODONE HYDROCHLORIDE 5 MG/1
5 TABLET ORAL EVERY 4 HOURS PRN
Status: DISCONTINUED | OUTPATIENT
Start: 2020-06-12 | End: 2020-07-01 | Stop reason: HOSPADM

## 2020-06-12 RX ORDER — INSULIN GLARGINE 100 [IU]/ML
10 INJECTION, SOLUTION SUBCUTANEOUS NIGHTLY
Status: ON HOLD | COMMUNITY
End: 2020-06-30 | Stop reason: HOSPADM

## 2020-06-12 RX ORDER — ACETAMINOPHEN 650 MG/1
650 SUPPOSITORY RECTAL EVERY 6 HOURS PRN
Status: CANCELLED | OUTPATIENT
Start: 2020-06-12

## 2020-06-12 RX ORDER — HYDROCODONE BITARTRATE AND ACETAMINOPHEN 10; 325 MG/1; MG/1
1 TABLET ORAL EVERY 6 HOURS PRN
Status: ON HOLD | COMMUNITY
End: 2020-06-30 | Stop reason: HOSPADM

## 2020-06-12 RX ORDER — SODIUM CHLORIDE 0.9 % (FLUSH) 0.9 %
10 SYRINGE (ML) INJECTION EVERY 12 HOURS SCHEDULED
Status: DISCONTINUED | OUTPATIENT
Start: 2020-06-12 | End: 2020-06-12

## 2020-06-12 RX ORDER — DEXTROSE MONOHYDRATE 25 G/50ML
12.5 INJECTION, SOLUTION INTRAVENOUS PRN
Status: DISCONTINUED | OUTPATIENT
Start: 2020-06-12 | End: 2020-07-01 | Stop reason: HOSPADM

## 2020-06-12 RX ORDER — INSULIN LISPRO 100 [IU]/ML
0-6 INJECTION, SOLUTION INTRAVENOUS; SUBCUTANEOUS
Status: DISCONTINUED | OUTPATIENT
Start: 2020-06-12 | End: 2020-06-30

## 2020-06-12 RX ADMIN — APIXABAN 2.5 MG: 2.5 TABLET, FILM COATED ORAL at 07:59

## 2020-06-12 RX ADMIN — Medication 10 ML: at 08:07

## 2020-06-12 RX ADMIN — AMLODIPINE BESYLATE 5 MG: 5 TABLET ORAL at 07:59

## 2020-06-12 RX ADMIN — OXYCODONE 10 MG: 5 TABLET ORAL at 05:00

## 2020-06-12 RX ADMIN — LINAGLIPTIN 5 MG: 5 TABLET, FILM COATED ORAL at 08:06

## 2020-06-12 RX ADMIN — OXYCODONE 10 MG: 5 TABLET ORAL at 14:25

## 2020-06-12 RX ADMIN — INSULIN LISPRO 1 UNITS: 100 INJECTION, SOLUTION INTRAVENOUS; SUBCUTANEOUS at 22:13

## 2020-06-12 RX ADMIN — DESMOPRESSIN ACETATE 40 MG: 0.2 TABLET ORAL at 07:59

## 2020-06-12 RX ADMIN — OXYCODONE 10 MG: 5 TABLET ORAL at 21:34

## 2020-06-12 RX ADMIN — DOCUSATE SODIUM 100 MG: 100 CAPSULE ORAL at 07:59

## 2020-06-12 RX ADMIN — INSULIN LISPRO 1 UNITS: 100 INJECTION, SOLUTION INTRAVENOUS; SUBCUTANEOUS at 12:27

## 2020-06-12 RX ADMIN — APIXABAN 2.5 MG: 2.5 TABLET, FILM COATED ORAL at 22:04

## 2020-06-12 RX ADMIN — INSULIN LISPRO 2 UNITS: 100 INJECTION, SOLUTION INTRAVENOUS; SUBCUTANEOUS at 18:22

## 2020-06-12 RX ADMIN — SENNOSIDES 17.2 MG: 8.6 TABLET, FILM COATED ORAL at 22:04

## 2020-06-12 RX ADMIN — LATANOPROST 1 DROP: 50 SOLUTION OPHTHALMIC at 22:05

## 2020-06-12 ASSESSMENT — PAIN DESCRIPTION - LOCATION
LOCATION: LEG
LOCATION: LEG

## 2020-06-12 ASSESSMENT — PAIN SCALES - GENERAL
PAINLEVEL_OUTOF10: 7
PAINLEVEL_OUTOF10: 0
PAINLEVEL_OUTOF10: 7
PAINLEVEL_OUTOF10: 0
PAINLEVEL_OUTOF10: 7
PAINLEVEL_OUTOF10: 7
PAINLEVEL_OUTOF10: 0

## 2020-06-12 ASSESSMENT — PAIN DESCRIPTION - ORIENTATION
ORIENTATION: RIGHT
ORIENTATION: RIGHT

## 2020-06-12 ASSESSMENT — PAIN DESCRIPTION - PAIN TYPE
TYPE: ACUTE PAIN
TYPE: ACUTE PAIN

## 2020-06-12 NOTE — PROGRESS NOTES
[address]
Admitted from E.D after fall at home. Fx rt leg noted. A/OX4. Bp remained elevated after  Hydralazine MD MADE AWARE . Pain not resolved after 0258 oxycodone MD made aware new order for morphine given at 0620.f/c in place 750 output .  Electronically signed by Jolene Ball RN on 6/10/2020 at 6:46 AM
Hospitalist Progress Note    CC: Closed fracture of lateral portion of right tibial plateau      Admit date: 6/9/2020  Days in hospital:  1    Subjective/interval history: Pt S/E. No new complaints. Pt does not want to have her davis removed. States her pain is controlled. ROS:   Pertinent items are noted in HPI. Objective:    BP (!) 112/49   Pulse 66   Temp 97.9 °F (36.6 °C) (Oral)   Resp 16   Ht 5' 3\" (1.6 m)   Wt 154 lb (69.9 kg)   SpO2 96%   BMI 27.28 kg/m²     Gen: alert, NAD  HEENT: NC/AT, moist mucous membranes  Neck: supple, trachea midline, no anterior cervical or SC LAD  Heart: Normal s1/s2, RRR, no murmurs, gallops, or rubs. Lungs: clear bilaterally, no wheezing, no rales, no rhonchi, no use of accessory muscles  Abd: bowel sounds present, soft, nontender, nondistended, no masses  Extrem: No clubbing, cyanosis, no edema. Rt knee in brace, pulses intact in all 4 ext  Skin: no rashes or lesions  Psych: A & O x3, affect appropriate  Neuro: grossly intact, moves all four extremities spontaneously.  No focal deficits  Cap refill: +2 sec    Medications:  Scheduled Meds:   amLODIPine  5 mg Oral Daily    atorvastatin  40 mg Oral Daily    latanoprost  1 drop Both Eyes Nightly    insulin lispro  0-6 Units Subcutaneous TID WC    insulin lispro  0-3 Units Subcutaneous Nightly    sodium chloride flush  10 mL Intravenous 2 times per day    apixaban  2.5 mg Oral BID    glipiZIDE  5 mg Oral QAM AC    docusate sodium  100 mg Oral Daily    senna  1 tablet Oral Nightly    linagliptin  5 mg Oral Daily       PRN Meds:  glucose, dextrose, glucagon (rDNA), dextrose, sodium chloride flush, acetaminophen **OR** acetaminophen, polyethylene glycol, promethazine **OR** ondansetron, hydrALAZINE, oxyCODONE **OR** oxyCODONE    IV:   dextrose           Intake/Output Summary (Last 24 hours) at 6/11/2020 1418  Last data filed at 6/11/2020 0537  Gross per 24 hour   Intake --   Output 1225 ml
Occupational Therapy   Occupational Therapy Initial Assessment  Date: 2020   Patient Name: Claudean Agreste  MRN: [id number]     : 1933    Date of Service: 2020    Discharge Recommendations:  Claudean Agreste scored a 14/24 on the AM-PAC ADL Inpatient form. Current research shows that an AM-PAC score of 17 or less is typically not associated with a discharge to the patient's home setting. Based on the patient's AM-PAC score and their current ADL deficits, it is recommended that the patient have 5-7 sessions per week of Occupational Therapy at d/c to increase the patient's independence. At this time, this patient demonstrates the endurance, and/or tolerance for 3 hours of therapy each day, with a treatment frequency of 5-7x/wk. Please see assessment section for further patient specific details. If patient discharges prior to next session this note will serve as a discharge summary. Please see below for the latest assessment towards goals. OT Equipment Recommendations  Equipment Needed: No  Other: TBD next level of care. Pt appears to have needed equipment, good home set-up. Assessment   Performance deficits / Impairments: Decreased functional mobility ; Decreased balance;Decreased ADL status; Decreased endurance;Decreased high-level IADLs  Assessment: Pt is not at her baseline level of occupational function, based on the above deficits associated with L lateral tibia plateau fx. Pt would benefit from continued skilled acute OT services to address these deficits. Treatment Diagnosis: Decreased ADL/IADL status, functional mobility, endurance and balance associated with L lateral tibia plateau fx  Prognosis: Good  Decision Making: Low Complexity  History: Pt 81 yo, lives w/spouse in quad level home, assists spouse w/med mgt, pt independent ADLs/IADLs, ambulation w/SPC or RW. 2 recent falls.  PMH: HTN, HLD, DM, A-fib  Exam: ROM, MMT, 6 clicks, 5 performance deficits/impairments, evolving
Pt S/E. Pt admitted overnight with right lateral tibial plateau fracture and fibular head fracture. She reports pain in her right leg, worse with movement. Ortho consulted, no surgical plans. Will manage conservatively.  Will follow up in 2 weeks in the office  Pt, ot  Pain control  Restart home eliquis     Diabetes  - stable  - hold home metformin, cont glipizide, tradjenta  - SSI, accuchecks    hnt  - bp stable  - cont home meds    Atrial Fibrillation   - currently rate controlled  - On eliquis
Pt. Ate a small breakfast. Sleeping now. Denies current pain, received pain meds early this am pt reports. Denies needs, received am medications. Plan to sleep a little longer at this time. Immobilizer intact to right arm and leg.
Shift assessment completed and documented at this time. Pt lying in bed with knee immobilizer in place to right knee. Resps easy and unlabored. Pt. Appears to be comfortable. Rating pain 4/10 at this time. Not due for pain meds at this time. Can have at 10 PM. Pt. Agreeable to receive then. Fresh water provided. Neuro checks WNL. No other needs at this time. Call light and BST within reach. Fall precuations in place, will continue to monitor. The care plan and education has been reviewed and mutually agreed upon with the patient.
Out       1020   Minutes       40   Timed Code Treatment Minutes: Moise Egan, DPT, ATC-R [id number]

## 2020-06-12 NOTE — PROGRESS NOTES
4 Eyes Skin Assessment     The patient is being assess for  Admission    I agree that 2 RN's have performed a thorough Head to Toe Skin Assessment on the patient. ALL assessment sites listed below have been assessed. Areas assessed by both nurses:   [x]   Head, Face, and Ears   [x]   Shoulders, Back, and Chest  [x]   Arms, Elbows, and Hands   [x]   Coccyx, Sacrum, and IschIum  [x]   Legs, Feet, and Heels        Does the Patient have Skin Breakdown?   No         Malachi Prevention initiated:  Yes   Wound Care Orders initiated:  NA      [facility] nurse consulted for Pressure Injury (Stage 3,4, Unstageable, DTI, NWPT, and Complex wounds), New and Established Ostomies:  No      Nurse 1 eSignature: Electronically signed by Ivana Ingram RN on 6/12/20 at 4:31 PM EDT    **SHARE this note so that the co-signing nurse is able to place an eSignature**    Nurse 2 eSignature: Electronically signed by Molly Malin RN on 6/12/20 at 4:32 PM EDT

## 2020-06-12 NOTE — CARE COORDINATION
Pt will dc to ARU today. All dc needs met per CM.   Electronically signed by NIKOLE Arzate on 6/12/2020 at 11:46 AM

## 2020-06-12 NOTE — PROGRESS NOTES
At 1340 the patient admitted to room 4911 per bed. Transfer not occurred. Oriented to room, call light, phone, TV, thermostat, bed controls, bathroom, emergency cord, white board, therapy times, unit routine, visiting hours,  and meal times. Patient verbalized understanding. States bowel routine is every other day. Call light and personal items in reach, alarms active and in place.

## 2020-06-13 LAB
GLUCOSE BLD-MCNC: 115 MG/DL (ref 70–99)
GLUCOSE BLD-MCNC: 210 MG/DL (ref 70–99)
GLUCOSE BLD-MCNC: 230 MG/DL (ref 70–99)
GLUCOSE BLD-MCNC: 83 MG/DL (ref 70–99)
PERFORMED ON: ABNORMAL
PERFORMED ON: NORMAL

## 2020-06-13 PROCEDURE — 97535 SELF CARE MNGMENT TRAINING: CPT

## 2020-06-13 PROCEDURE — 1280000000 HC REHAB R&B

## 2020-06-13 PROCEDURE — 97530 THERAPEUTIC ACTIVITIES: CPT

## 2020-06-13 PROCEDURE — 97163 PT EVAL HIGH COMPLEX 45 MIN: CPT

## 2020-06-13 PROCEDURE — 6370000000 HC RX 637 (ALT 250 FOR IP): Performed by: PHYSICAL MEDICINE & REHABILITATION

## 2020-06-13 PROCEDURE — 94760 N-INVAS EAR/PLS OXIMETRY 1: CPT

## 2020-06-13 PROCEDURE — 97166 OT EVAL MOD COMPLEX 45 MIN: CPT

## 2020-06-13 RX ADMIN — SENNOSIDES 17.2 MG: 8.6 TABLET, FILM COATED ORAL at 20:21

## 2020-06-13 RX ADMIN — GLIPIZIDE 5 MG: 5 TABLET ORAL at 06:10

## 2020-06-13 RX ADMIN — AMLODIPINE BESYLATE 5 MG: 5 TABLET ORAL at 09:35

## 2020-06-13 RX ADMIN — INSULIN LISPRO 1 UNITS: 100 INJECTION, SOLUTION INTRAVENOUS; SUBCUTANEOUS at 20:23

## 2020-06-13 RX ADMIN — APIXABAN 2.5 MG: 2.5 TABLET, FILM COATED ORAL at 20:21

## 2020-06-13 RX ADMIN — LINAGLIPTIN 5 MG: 5 TABLET, FILM COATED ORAL at 09:36

## 2020-06-13 RX ADMIN — SENNOSIDES 17.2 MG: 8.6 TABLET, FILM COATED ORAL at 09:34

## 2020-06-13 RX ADMIN — OXYCODONE 10 MG: 5 TABLET ORAL at 06:10

## 2020-06-13 RX ADMIN — OXYCODONE 5 MG: 5 TABLET ORAL at 20:22

## 2020-06-13 RX ADMIN — LATANOPROST 1 DROP: 50 SOLUTION OPHTHALMIC at 20:36

## 2020-06-13 RX ADMIN — INSULIN LISPRO 2 UNITS: 100 INJECTION, SOLUTION INTRAVENOUS; SUBCUTANEOUS at 11:57

## 2020-06-13 RX ADMIN — DESMOPRESSIN ACETATE 40 MG: 0.2 TABLET ORAL at 09:35

## 2020-06-13 RX ADMIN — DOCUSATE SODIUM 100 MG: 100 CAPSULE ORAL at 09:35

## 2020-06-13 RX ADMIN — APIXABAN 2.5 MG: 2.5 TABLET, FILM COATED ORAL at 09:35

## 2020-06-13 ASSESSMENT — PAIN DESCRIPTION - PAIN TYPE: TYPE: ACUTE PAIN

## 2020-06-13 ASSESSMENT — PAIN DESCRIPTION - LOCATION: LOCATION: LEG

## 2020-06-13 ASSESSMENT — PAIN SCALES - GENERAL
PAINLEVEL_OUTOF10: 6
PAINLEVEL_OUTOF10: 0
PAINLEVEL_OUTOF10: 6

## 2020-06-13 ASSESSMENT — PAIN DESCRIPTION - ORIENTATION: ORIENTATION: RIGHT

## 2020-06-13 NOTE — PLAN OF CARE
Patient encourage to use call light with any needs. Patient states understanding, call light in reach, bed alarm on. Will continue to monitor.

## 2020-06-13 NOTE — PLAN OF CARE
[address], [address]    : 1933  Acct #: [de-identified]  MRN: [id number]  PHYSICIAN:  Hilaria Sanchez MD  Primary Problem    Patient Active Problem List   Diagnosis    Paroxysmal atrial fibrillation [facility])    Closed fracture of lateral portion of right tibial plateau    Chronic anticoagulation    Closed fracture of upper end of right fibula    Fall on stairs    Closed fracture of right tibial plateau       Rehabilitation Diagnosis:  Right Tibial and Fibula Fractures     ADMIT DATE:2020    Patient Goals:  To return home with spouse  Admitting Impairments: Orthopedic  Activities: Eating, bathing, dressing, toileting, transfers, mobility and endurance   Participation: Prior to admission was independent and driving   CARE PLAN     NURSING:  Reji Schwartz while on this unit will:  [] Be continent of bowel and bladder     [] Have an adequate number of bowel movements  [] Urinate with no urinary retention >300ml in bladder  [x] Complete bladder protocol with davis removal  [] Maintain O2 SATs at ___%  [x] Have pain managed while on ARU       [] Be pain free by discharge   [x] Have no skin breakdown while on ARU  [] Have improved skin integrity via wound measurements  [] Have no signs/symptoms of infection at the wound site  [x] Be free from injury during hospitalization   [] Complete education with patient/family with understanding demonstrated for:  [] Adjustment   [] Other:     Nursing Interventions will include:  [] bowel/bladder training   [x] education for medical assistive devices   [x] medication education   [] O2 saturation management   [] energy conservation   [] stress management techniques   [x] fall prevention   [x] alarms protocol   [] seating and positioning   [] skin/wound care   [x] pressure relief instruction   [] dressing changes     [] infection protection   [] DVT prophylaxis [x] assistance with in room safety with transfers to bed, toilet, wheelchair, shower   [x] bathroom activities and hygiene  [] Other:    Patient/Caregiver Education for:  [] Disease/sustained injury/management     [] Medication Use  [] Surgical intervention  [] Safety  [] Body mechanics and or joint protection  [] Health maintenance     [] Other:     PHYSICAL THERAPY:  Goals:                  Short term goals  Time Frame for Short term goals: 1 week  Short term goal 1: Supine to/from sit with Min A  Short term goal 2: Sit to/from stand with Mod A x 1  Short term goal 3: Propel w/c x 76' with Min A  Short term goal 4: Amb x 1 lap in 11 bars with Mod A            Long term goals  Time Frame for Long term goals : 2 weeks  Long term goal 1: Supine to/from sit with Mod I  Long term goal 2: Sit to/from stand with Mod I  Long term goal 3: Bed to/from w/c with Supervision  Long term goal 4: Amb. x 25' with FWW and Min A  Long term goal 5: Propel w/c x 150' with Mod I  Long term goal 6: Car transfer with Supevision  Long term goal 7: Up and down 4 steps backward with Min A   These goals were reviewed with this patient at the time of assessment and Brian Guzman is in agreement.      Plan of Care: Pt to be seen 5 out of 7 days per week per ARU protocol ( 90 minutes with PT)                  Current Treatment Recommendations: Strengthening, ROM, Balance Training, Functional Mobility Training, Transfer Training, Neuromuscular Re-education, Endurance Training, Safety Education & Training, Gait Training, Equipment Evaluation, Education, & procurement, Patient/Caregiver Education & Training, Stair training, Home Exercise Program    OCCUPATIONAL THERAPY:  Goals:             Short term goals  Time Frame for Short term goals: 3 weeks   Short term goal 1: Mod I functional transfers to toilet and shower   Short term goal 2: mod I UB bathing/dressing  Short term goal 3: min A LB bathing/dressing   Short term goal 4: min A toileting Short term goal 5: mod I functional mobility from w/c level for ADLs/IADLs :    :  These goals were reviewed with this patient at the time of assessment and Carolann Valera is in agreement    Plan of Care:  Pt to be seen 5 out of 7 days per week per ARU protocol ( 90 minutes with OT)  Patient Education: continue to reinforce for carryover    SPEECH THERAPY: Goals will be left blank if speech is not following this patient. Goals:                                                                               Plan of Care:  Pt to be seen 5 out of 7 days per week per ARU protocol ( ** minutes with SLP)    Therapy Treatments will include:  [x]  therapeutic exercises    [x]  gait training     [x]  neuromuscular re-ed                            [x]  transfer training             [x] community reintegration    [x] bed mobility                          [x]  w/c mobility and training  [x]  self care    [x]home mgmt    []  cognitive training            [x]  energy conservation        []  dysphagia tx    []  speech/language/communication therapy   [x]  group therapy    [x]  patient/family education    [] Other:    CASE MANAGEMENT:  Goals:   Assist patient/family with discharge planning, patient/family counseling,   and coordination with insurance during ARU stay. Carolann Valera will be seen a minimum of 3 hours of therapy per day, a minimum of 5 out of 7 days per week  (please see above for specific treatment plan per PT/OT/SLP). [] In this rare instance due to the nature of this patient's medical involvement, this patient will be seen 15 hours per week (900 minutes within a 7 day period). In addition, dietician/nutritionist may monitor calorie count as well as intake and collaboratively work with SLP on dietary upgrades. Neuropsychology/Psychology may evaluate and provide necessary support.     Medical issues being managed closely and that require 24 hour availability of a physician:  [x] Swallowing Precautions [x] Bowel/Bladder Fx  [x] Weight bearing precautions  [] Wound Care    [x] Pain Mgmt   [] Infection Protection  [x] DVT Prophylaxis   [x] Fall Precautions  [x] Fluid/Electrolyte/Nutrition Balance  [x] Voice Protection   [x] Respiratory  [] Other:    Medical Prognosis: [] Good  [x] Fair    [] Guarded   Total expected IRF days 13 days  Anticipated discharge destination: Home  [] Home Independently   [x] Home with supervision    []SNF     [] Other                                           Physician anticipated functional outcomes: Will regain function to a supervision/touching level of assistance for most actvities   IPOC brief synthesis: 49-year-old female with a history of diabetes, hyperlipidemia, hypertension, and A. fib on Eliquis who was admitted on 6/10 with right knee pain after falling down a few stairs. Erasto Alpha revealed a comminuted and mildly depressed fracture involving the lateral tibial plateau as well as a proximal fracture of the fibular head.  Ortho evaluated and suggested conservative management with nonweightbearing in the right lower extremity.  Therapy evaluated and suggested she continue in an inpatient setting prior to returning home. She was admitted with the above goal.      I have reviewed this initial plan of care and agree with its contents:    Title   Name    Date    Time    Physician: Electronically signed by Ana Paula Covarrubias MD on 6/15/2020 at 11:20 AM    Case Mgmt: NIKOLE Costa, [place], 6/13/2020, 5621    OT: Jan M. Dalbert Severance OTR/L [id number], 6/13/2020, 11:00 AM    PT:  Tuan Weir PT, DPT [id number] 6/15/20 0944    RN: Danette Barry RN 6/13/20  0132    : Chidi Simpson MA [facility], 6/15/2020 1023    Other:

## 2020-06-13 NOTE — H&P
Patient: Reji Shcwartz  [id number]  Date: 6/13/2020      Chief Complaint: right leg pain     History of Present Illness/Hospital Course:  57-year-old female with a history of diabetes, hyperlipidemia, hypertension, and A. fib on Eliquis who was admitted on 6/10 with right knee pain after falling down a few stairs. X-ray revealed a comminuted and mildly depressed fracture involving the lateral tibial plateau as well as a proximal fracture of the fibular head. Ortho evaluated and suggested conservative management with nonweightbearing in the right lower extremity. Therapy evaluated and suggested she continue in an inpatient setting prior to returning home. She reports her pain is controlled. She lives in a quad level home with multiple half staircases.     Prior Level of Function:  Independent     Current Level of Function:  Total assist     has a past medical history of Arthritis, Diabetes mellitus ([facility] [address] Hyperlipidemia, Hypertension, and Paroxysmal atrial fibrillation ([facility] [address] has a past surgical history that includes Cholecystectomy (1990). reports that she has never smoked. She has never used smokeless tobacco. She reports that she does not drink alcohol or use drugs. Family history is unknown by patient.     Allergies: Chicken meat (diagnostic)    Current Facility-Administered Medications   Medication Dose Route Frequency Provider Last Rate Last Dose    acetaminophen (TYLENOL) tablet 650 mg  650 mg Oral Q6H PRN Hilaria Sanchez MD        polyethylene glycol [facility]) packet 17 g  17 g Oral Daily PRN Hilaria Sanchez MD        sodium chloride flush 0.9 % injection 10 mL  10 mL Intravenous PRN Hilaria Sanchez MD        dextrose 5 % solution  100 mL/hr Intravenous PRN Hilaria Sanchez MD        dextrose 50 % IV solution  12.5 g Intravenous PRN Hilaria Sanchez MD        glucagon (rDNA) injection 1 mg  1 mg Intramuscular PRN Hilaria Sanchez MD        glucose (GLUTOSE) 40 % oral gel 15 g  15 g Oral PRN Charline Ch MD        insulin lispro (1 Unit Dial) 0-6 Units  0-6 Units Subcutaneous TID WC Charline Ch MD   2 Units at 06/12/20 1822    insulin lispro (1 Unit Dial) 0-3 Units  0-3 Units Subcutaneous Nightly Charline Ch MD   1 Units at 06/12/20 2213    amLODIPine (NORVASC) tablet 5 mg  5 mg Oral Daily Charline Ch MD        apixaban Gennett Bars) tablet 2.5 mg  2.5 mg Oral BID Charline Ch MD   2.5 mg at 06/12/20 2204    atorvastatin (LIPITOR) tablet 40 mg  40 mg Oral Daily Charline Ch MD        diphenhydrAMINE (BENADRYL) tablet 25 mg  25 mg Oral Q6H PRN Charline hC MD        docusate sodium (COLACE) capsule 100 mg  100 mg Oral Daily Charline Ch MD        glipiZIDE (GLUCOTROL) tablet 5 mg  5 mg Oral QAM AC Charline Ch MD   5 mg at 06/13/20 0610    hydrALAZINE (APRESOLINE) tablet 50 mg  50 mg Oral Q8H PRN Charline Ch MD        latanoprost (XALATAN) 0.005 % ophthalmic solution 1 drop  1 drop Both Eyes Nightly Charline Ch MD   1 drop at 06/12/20 2205    linagliptin (TRADJENTA) tablet 5 mg  5 mg Oral Daily Charline Ch MD        ondansetron (ZOFRAN-ODT) disintegrating tablet 4 mg  4 mg Oral Q8H PRN Charline Ch MD        oxyCODONE (ROXICODONE) immediate release tablet 5 mg  5 mg Oral Q4H PRN Charline Ch MD        Or    oxyCODONE (ROXICODONE) immediate release tablet 10 mg  10 mg Oral Q4H PRN Charline Ch MD   10 mg at 06/13/20 0610    traZODone (DESYREL) tablet 50 mg  50 mg Oral Nightly PRN Charline Ch MD        [facility]) tablet 17.2 mg  2 tablet Oral BID Charline Ch MD   17.2 mg at 06/12/20 2204    magnesium hydroxide (MILK OF MAGNESIA) 400 MG/5ML suspension 30 mL  30 mL Oral Daily PRN Charline Ch MD        sodium chloride flush 0.9 % injection 10 mL  10 mL Intravenous PRN Charline Ch MD           REVIEW OF SYSTEMS:   CONSTITUTIONAL: negative for fevers, chills, diaphoresis, appetite change, fatigue, night sweats and unexpected weight change. HEENT: negative for hearing loss, tinnitus, ear drainage, sinus pressure, nasal congestion, epistaxis and snoring. RESPIRATORY: Negative for hemoptysis, cough, sputum production. CARDIOVASCULAR: negative for chest pain, palpitations, exertional chest pressure/discomfort, edema, syncope. GASTROINTESTINAL: negative for nausea, vomiting, diarrhea, constipation, blood in stool and abdominal pain. GENITOURINARY: negative for frequency, dysuria, urinary incontinence, decreased urine volume, and hematuria. HEMATOLOGIC/LYMPHATIC: negative for easy bruising, bleeding and lymphadenopathy. ALLERGIC/IMMUNOLOGIC: negative for recurrent infections, angioedema, anaphylaxis and drug reactions. ENDOCRINE: negative for weight changes and diabetic symptoms including polyuria, polydipsia and polyphagia. MUSCULOSKELETAL: positive for pain, joint swelling, decreased range of motion and muscle weakness. NEUROLOGICAL: negative for headaches, slurred speech, unilateral weakness. PSYCHIATRIC/BEHAVIORAL: negative for hallucinations, behavioral problems, confusion and agitation.      All pertinent positives are noted in the HPI. Physical Examination:  Vitals:   Patient Vitals for the past 24 hrs:   BP Temp Temp src Pulse Resp SpO2 Height   06/13/20 0811 -- -- -- -- 16 96 % --   06/12/20 2145 139/70 98.8 °F (37.1 °C) Oral 71 16 94 % --   06/12/20 1600 (!) 168/54 98.7 °F (37.1 °C) Oral 66 16 95 % 5' 3\" (1.6 m)     Psych: Stable mood, normal judgement, normal affect   Const: No distress  Eyes: Conjunctiva noninjected, no icterus noted; pupils equal, round. HENT: Atraumatic, normocephalic; Oral mucosa moist  Neck: Trachea midline, neck supple. No thyromegaly noted. CV: Regular rate and rhythm, no murmur rub or gallop noted  Resp: Lungs clear to auscultation bilaterally, no rales wheezes or ronchi, no retractions. Respirations unlabored. GI: Soft, nontender, nondistended.  Normal bowel sounds. No palpable masses. Neuro: Alert, oriented, appropriate. No cranial nerve deficits appreciated. Sensation intact to light touch. Motor examination reveals: BUE 5/5 LLE 4/5 RLE deferred. Reflexes normal and symmetric. Skin: Normal temperature and turgor  MSK: RLE with mild ecchymoses over lateral aspect of the knee. No other joint abnormalities noted  Ext: 1+ RLE edema appreciated. No varicosities. Lab Results   Component Value Date    WBC 9.3 06/11/2020    HGB 9.9 (L) 06/11/2020    HCT 31.5 (L) 06/11/2020    MCV 78.7 (L) 06/11/2020     06/11/2020     Lab Results   Component Value Date    INR 1.15 (H) 06/10/2020    PROTIME 13.4 (H) 06/10/2020     Lab Results   Component Value Date    CREATININE 1.3 (H) 06/11/2020    BUN 18 06/11/2020     (L) 06/11/2020    K 4.3 06/11/2020     06/11/2020    CO2 26 06/11/2020     Lab Results   Component Value Date    ALT 15 06/11/2020    AST 22 06/11/2020    ALKPHOS 120 06/11/2020    BILITOT 0.5 06/11/2020       Most recent imaging studies revealed   EXAMINATION:   CT OF THE RIGHT KNEE WITHOUT CONTRAST 6/9/2020 8:09 pm       TECHNIQUE:   CT of the right knee was performed without the administration of intravenous   contrast.  Multiplanar reformatted images are provided for review. Dose   modulation, iterative reconstruction, and/or weight based adjustment of the   mA/kV was utilized to reduce the radiation dose to as low as reasonably   achievable.       COMPARISON:   None.       HISTORY   ORDERING SYSTEM PROVIDED HISTORY: tibial plateau fracture   TECHNOLOGIST PROVIDED HISTORY:   Reason for exam:->tibial plateau fracture   Reason for Exam: knee pain   Acuity: Acute   Type of Exam: Initial   Mechanism of Injury: fall   Relevant Medical/Surgical History: ([facility]   home. Patient reports falling down a step at home and hitting right knee. Patient unable to bear weight to right knee. )       FINDINGS:   Bones:  The bones are demineralized.  There is a comminuted fracture involving   the lateral tibial plateau.  No extension into the medial tibial plateau is   found.  There is mild depression measuring very roughly 2-3 mm.  Fracture   planes are seen extending into the proximal tibia-fibular joint.       Comminuted fracture of the fibular head is noted as well.       Soft Tissue: Aria Comment is a great deal of edema and blood products extending   from the fracture into the surrounding soft tissues.       Joint:  Large lipohemarthrosis is noted. Aria Comment is a background of   tricompartmental degenerative disease, greatest within the patellofemoral   joint.           Impression   Comminuted, mildly depressed fracture involving the lateral tibial plateau. No extension into the medial tibial plateau.       Proximal fibular head fracture.            The above laboratory data have been reviewed.      The above imaging data have been reviewed.      The above medical testing have been reviewed. Body mass index is 27.28 kg/m². Barriers to Discharge: WB restrictions, pain, endurance, ADLs    POST ADMISSION PHYSICIAN EVALUATION  The patient has agreed to being admitted to our comprehensive inpatient  rehabilitation facility consisting of at least 180 minutes of therapy a day,  5 out of 7 days a week. The patient/family has a good understanding of our discharge process. The  patient has potential to make improvement and is in need of at least two of  the following multidisciplinary therapies including but not limited to  physical, occupational, respiratory, and speech, nutritional services, wound care, and prosthetics and orthotics. Given the patients complex condition  and risk of further medical complications, rehabilitation services cannot be  safely provided at a lower level of care such as a skilled nursing facility.   I have compared the patients medical and functional status at the time of the  preadmission screening and the same on this date, and there are no significant changes except as documented below in the assessment and plan. By signing this document, I acknowledge that I have personally performed a  full physical examination on this patient within 24 hours of admission to  this inpatient rehabilitation facility and have determined the patient to be  able to tolerate the above course of treatment at an intensive level for a  reasonable period of time. I will be completing a detailed individualized  Plan of Care for this patient by day four of the patients stay based upon the  Preadmission Screen, this Post-Admission Evaluation, and the therapy  evaluations. Assessment and Plan:  Right tibial plateau fracture: NWB in immobilizer. Pain control. PT/OT    Proximal right fibular fracture: as above    Afib: eliquis    HTN: norvasc 5    HLD: lipitor 40    DM: glipizide 5, tradjenta 5, SSI    Bowels: Per protocol  Bladder: Per protocol   Sleep: Trazodone provided prn. Pain: tylenol, braxton PRN   DVT PPx: eliquis   ELOS: 10-14    Jina Hall MD 6/13/2020, 9:06 AM     * This document was created using dictation software. While all precautions were taken to ensure accuracy, errors may have occurred. Please disregard any typographical errors.

## 2020-06-13 NOTE — PROGRESS NOTES
Physical Therapy  Initial Assessment  Date: 2020  Patient Name: Brian Guzman  MRN: [id number]  : 1933          Restrictions  Restrictions/Precautions  Restrictions/Precautions: Weight Bearing  Required Braces or Orthoses?: Yes  Lower Extremity Weight Bearing Restrictions  Right Lower Extremity Weight Bearing: Non Weight Bearing  Required Braces or Orthoses  Right Lower Extremity Brace: Knee Immobilizer  Right Upper Extremity Brace/Splint: Pt wears R UE splint for support w/WB; broke wrist 2019  Position Activity Restriction  Other position/activity restrictions: The pt had a fall on to her R knee on . Found to have lateral tibial plateau and fibular head fractures. Will be treated conservatively with knee immobilizer and NWB. Subjective   General  Chart Reviewed: Yes  Family / Caregiver Present: No  Referring Practitioner: Dr. Curt Rodrigues  Referral Date : 20  Diagnosis: fall, tibial plateau and fibular head fractures  Follows Commands: Within Functional Limits  General Comment  Comments: Pt. supine in bed and agreable to therapy evaluation. Pt. denies pain but very guarded with R LE movement  Pain Screening  Patient Currently in Pain: Yes  Vital Signs  Patient Currently in Pain: Yes    Vision/Hearing   Wears glasses    Orientation   WFL    Social/Functional History  Social/Functional History  Lives With: Spouse  Type of Home: House  Home Layout: Multi-level(quad level, plans on staying on the lower level with 1/2 bath and level entry. 5 steps up to LR, DR, Kitchen, another 5 steps to the bedrooms and full bath)  Home Access: Level entry  Bathroom Shower/Tub: Tub/Shower unit; Shower chair with back  [employer]: Handicap height(Raised toilet seat)  Bathroom Equipment: Shower chair;Grab bars in shower;Hand-held shower; Toilet raiser;Grab bars around toilet  Bathroom Accessibility: Ferna Meo accessible; Wheelchair accessible  Home Equipment: Rolling walker;Cane;Lift chair;Wheelchair-manual;Reacher;Sock aid  Receives Help From: Family  ADL Assistance: Independent  Homemaking Assistance: Needs assistance([facility] cleans house and Pt. cooks and grocery shops and does laundry (down 5 steps))  Ambulation Assistance: Independent(Usually walks with FWW or SPC, Uses cane on stairs, cart in grocery)  Transfer Assistance: Independent  Active : Yes  Leisure & Hobbies: Reading, play dominos, Hand & Foot card game  Additional Comments:  had a stroke 1 yr ago. Pt does medication mgt. Spouse independent ADLs, ambulaton. 3 sons. Pt can sleep in lift chair downstairs or twin bed. 1 fall pror to fall PTA. Ruben Cast in [place] when trimming grass    Objective          AROM RLE (degrees)  RLE AROM: Exceptions  RLE General AROM: Limited ankle DF Lacks 10 degrees, no knee flexion observed per restrictions,  able to perform seated hip flexion w/ unilateral UE assist.    Strength RLE  Comment: Limited ankle DF, no knee flexion observed d/t restrictions, able to perform seated hip flexion w/ unilateral UE assist.  Strength LLE  Strength LLE: WFL  Tone RLE  RLE Tone: Normotonic  Tone LLE  LLE Tone: Normotonic  Motor Control  Gross Motor?: WFL     Sensation  Overall Sensation Status: WFL  Bed mobility  Rolling to Left: Minimal assistance  Rolling to Right: Minimal assistance  Supine to Sit: Moderate assistance  Sit to Supine: Unable to assess(Left up in chair)  Scooting: Minimal assistance; Moderate assistance(fluctuates)  Transfers  Sit to Stand: 2 Person Assistance; Moderate Assistance(Max A x 2 from bed)  Stand to sit: Moderate Assistance;2 Person Assistance  Bed to Chair: 2 Person Assistance(Max A x 2 )  Stand Pivot Transfers: 2 Person Assistance(Max A x 2)  Comment: Transfer to shower chair Max A x 1 and Mod A x 1 with use of rail and pivot       Ambulation  Ambulation?: No(Unable at this time d/t weakness and NWB R LE)  Stairs/Curb  Stairs?: No  Wheelchair Activities  Wheelchair Size: time.  Do not anticipate Pt. being a functional ambulator until WB restrictions lifted which is anticpated to be in approximately 10 weeks. Pt. will benefit from skilled PT to improve functional mobility. Prognosis: Good  Barriers to Learning: hearing  REQUIRES PT FOLLOW UP: Yes  Discharge Recommendations: Home with Home health PT;24 hour supervision or assist;S Level 3  Activity Tolerance  Activity Tolerance: Patient Tolerated treatment well;Patient limited by pain  Activity Tolerance: may be a bit limited by apprehension d/t pain         Plan   Plan  Times per week: Pt. to be seen 90 minutes, 5 out of the 7 days/wk  Current Treatment Recommendations: Strengthening, ROM, Balance Training, Functional Mobility Training, Transfer Training, Neuromuscular Re-education, Endurance Training, Safety Education & Training, Gait Training, Equipment Evaluation, Education, & procurement, Patient/Caregiver Education & Training, Stair training, Home Exercise Program  Safety Devices  Type of devices:  All fall risk precautions in place, Call light within reach, Left in chair, Chair alarm in place, Nurse notified  Restraints  Initially in place: No                                                                          Goals  Short term goals  Time Frame for Short term goals: 1 week  Short term goal 1: Supine to/from sit with Min A  Short term goal 2: Sit to/from stand with Mod A x 1  Short term goal 3: Propel w/c x 76' with Min A  Short term goal 4: Amb x 1 lap in 11 bars with Mod A  Long term goals  Time Frame for Long term goals : 2 weeks  Long term goal 1: Supine to/from sit with Mod I  Long term goal 2: Sit to/from stand with Mod I  Long term goal 3: Bed to/from w/c with Supervision  Long term goal 4: Amb. x 25' with FWW and Min A  Long term goal 5: Propel w/c x 150' with Mod I  Long term goal 6: Car transfer with [employer]  Long term goal 7: Up and down 4 steps backward with Min A   Patient Goals   Patient goals : Improve mobility       Therapy Time   Individual Concurrent Group Co-treatment   Time In      0730   Time Out      0835   Minutes      65   Timed Code Treatment Minutes: 48 Minutes     Second Session Therapy Time:   Individual Concurrent Group Co-treatment   Time In       1330   Time Out       1400   Minutes       30     Timed Code Treatment Minutes:  27+95    Total Treatment Minutes:  Moise Chaudhry, [id number]

## 2020-06-13 NOTE — PLAN OF CARE
Introduced self to patient. Initial shift assessment completed, see complex assessment in doc flowsheet. Questions answered.

## 2020-06-13 NOTE — PROGRESS NOTES
Nutrition Assessment    Type and Reason for Visit: Initial, Consult    Nutrition Recommendations:   Glucerna once per day (chocolate)  Obtain current body weight    Nutrition Assessment: Pt is nutritionally stable AEB overall good po intake on carb control diet. Pt reports appetite is not as good as normal, but eating ok. Pt with increased protein needs s/p fibular fx. Pt would like a Glucerna once per day, otherwise pt has no other concerns. Malnutrition Assessment:  · Malnutrition Status: No malnutrition    Nutrition Risk Level:  Moderate    Nutrient Needs:  · Estimated Daily Total Kcal: 1400 - 1750  · Estimated Daily Protein (g): 78 - 94  · Estimated Daily Total Fluid (ml/day): 1 mL/1kcal    Nutrition Diagnosis:   · Problem: No nutrition diagnosis at this time    Objective Information:  · Nutrition-Focused Physical Findings: edema +2 RLE edema; upper dentures; LBM 6/08 (prior to admission)  · Wound Type: None  · Current Nutrition Therapies:  · Oral Diet Orders: Carb Control 4 Carbs/Meal   · Oral Diet intake: 51-75%  · Anthropometric Measures:  · Ht: 5' 3\" (160 cm)   · Current Body Wt: 154 lb (69.9 kg)  · Ideal Body Wt: 115 lb (52.2 kg)   · BMI Classification: BMI 25.0 - 29.9 Overweight    Nutrition Interventions:   Continue current diet, Start ONS  Continued Inpatient Monitoring    Nutrition Evaluation:   · Evaluation: Goals set   · Goals: po intaek 50% or greater    · Monitoring: Meal Intake, Supplement Intake      Electronically signed by Eliana Garcia RD, LD on 6/13/20 at 11:05 AM EDT  Contact Number: [phone]

## 2020-06-13 NOTE — PROGRESS NOTES
Occupational Therapy   Occupational Therapy Initial Assessment  Date: 2020   Patient Name: Nisha Oneal  MRN: [id number]     : 1933    Date of Service: 2020    Discharge Recommendations:  Home with Home health OT, S Level 3, Home with assist PRN  OT Equipment Recommendations  Other: has raised toilet seat, hip kit, will likely require w/c for mobility     Assessment   Performance deficits / Impairments: Decreased functional mobility ; Decreased balance;Decreased ADL status; Decreased endurance;Decreased high-level IADLs  Assessment: Pt is not at her baseline level of occupational function, based on the above deficits associated with R lateral tibia plateau fx. Pt would benefit from continued intensive skilled acute OT services to address these deficits. Treatment Diagnosis: Decreased ADL/IADL status, functional mobility, endurance and balance associated with R lateral tibia plateau fx  Prognosis: Good  OT Education: OT Role;Transfer Training;Precautions;Plan of Care;ADL Adaptive Strategies  Patient Education: continue to reinforce for carryover  REQUIRES OT FOLLOW UP: Yes  Activity Tolerance  Activity Tolerance: Patient Tolerated treatment well  Safety Devices  Safety Devices in place: Yes  Type of devices: Call light within reach; Chair alarm in place; Left in chair  Restraints  Initially in place: No           Patient Diagnosis(es): R tibial plateau fracture, NWB      has a past medical history of Arthritis, Diabetes mellitus ([facility] [address] Hyperlipidemia, Hypertension, and Paroxysmal atrial fibrillation ([facility] [address] has a past surgical history that includes Cholecystectomy ().     Treatment Diagnosis: Decreased ADL/IADL status, functional mobility, endurance and balance associated with R lateral tibia plateau fx      Restrictions  Restrictions/Precautions  Restrictions/Precautions: Weight Bearing  Required Braces or Orthoses?: Yes  Lower Extremity Weight Bearing Restrictions  Right Lower Extremity Weight Bearing: Non Weight Bearing  Required Braces or Orthoses  Right Lower Extremity Brace: Knee Immobilizer  Right Upper Extremity Brace/Splint: Pt wears R UE splint for support w/WB; broke wrist 4/2019  Position Activity Restriction  Other position/activity restrictions: The pt had a fall on to her R knee on 6/9. Found to have lateral tibial plateau and fibular head fractures. Will be treated conservatively with knee immobilizer and NWB. Subjective   General  Chart Reviewed: Yes  Response to previous treatment: Patient with no complaints from previous session  Family / Caregiver Present: No  Referring Practitioner: Ankit Roche DO, for d/c planning  Diagnosis: L lateral tibia plateau fx  Subjective  Subjective: pt in bed on arrival - RN in briefly and states pt just had pain medications - appears comfortable at rest but with movement complains of pain (does not rate numerically)  General Comment  Comments: Collette Ruts Vital Signs  BP: (!) 155/53  Social/Functional History  Social/Functional History  Lives With: Spouse  Type of Home: House  Home Layout: Multi-level(quad level, plans on staying on the lower level with 1/2 bath and level entry.   5 steps up to LR, DR, Kitchen, another 5 steps to the bedrooms and full bath)  Home Access: Level entry  Bathroom Shower/Tub: Tub/Shower unit, Shower chair with back  [employer]: Handicap height(Raised toilet seat)  Bathroom Equipment: Shower chair, Grab bars in shower, Hand-held shower, Toilet raiser, Grab bars around toilet  Bathroom Accessibility: Walker accessible, Wheelchair accessible  Home Equipment: Rolling walker, Cane, Lift chair, [employer], [address], Sock aid  Receives Help From: Family  ADL Assistance: Independent  Homemaking Assistance: Needs assistance([facility] cleans house and Pt. cooks and grocery shops and does laundry (down 5 steps))  Ambulation Assistance: Independent(Usually walks with FWW or SPC, Uses cane on stairs, cart in grocery)  Transfer Decreased awareness of need for assistance  Problem Solving: Assistance required to generate solutions;Assistance required to implement solutions  Insights: Decreased awareness of deficits  Initiation: Requires cues for some  Sequencing: Requires cues for some  Cognition Comment: [place] limiting cognition, also lethargic on arrival for session having just awakened when therapy entered         Sensation  Overall Sensation Status: WFL        LUE AROM (degrees)  LUE AROM : WFL  Left Hand AROM (degrees)  Left Hand AROM: WFL  RUE AROM (degrees)  RUE AROM : WFL  Right Hand AROM (degrees)  Right Hand AROM: WFL  LUE Strength  Gross LUE Strength: WFL  RUE Strength  Gross RUE Strength: WFL      PM session - pt in chair on arrival - endorses fatigue but does not complain of pain in sitting - w/c mobility using B UEs and therapist assisted to britni L shoe to steer with shoe- SBA to occasional min A for steering about 80 feet in hallway prior to fatiguing - stood x 2 in II bars, 30 seconds first stand, 1 minute second stand with mod A of 1/min A of 1 for initial stand and mod A of 1 to maintain - mod A of 2 to return to sitting - pt with good maintenance of NWB status during standing - returned to room in w/c - squat pivot transfer mod A of 2 persons to EOB - scooting while sitting min A - mod A of 2 sit to supine - in bed with alarm in place and needs in reach end of session   Recommend slide board with nursing staff at this time due to patient's difficulty with SPT and squat pivot transfers.       Plan   Plan  Times per week: 90 minutes 5 days per week   Times per day: Daily  Current Treatment Recommendations: Functional Mobility Training, Safety Education & Training, Balance Training, Self-Care / ADL, Endurance Training       Goals  Short term goals  Time Frame for Short term goals: 3 weeks   Short term goal 1: Mod I functional transfers to toilet and shower   Short term goal 2: mod I UB bathing/dressing  Short term goal 3: min A LB bathing/dressing   Short term goal 4: min A toileting   Short term goal 5: mod I functional mobility from w/c level for ADLs/IADLs  Patient Goals   Patient goals : \"to return home\"        Therapy Time   Individual Concurrent Group Co-treatment   Time In       0730   Time Out       0830   Minutes       60   Second Session Therapy Time:   Individual Concurrent Group Co-treatment   Time In       1330   Time Out       1400   Minutes       30       Total Treatment Minutes:  90    Timed Code Treatment Minutes: 50 Minutes + 30  (-10 timed minutes for evaluation)       REN Mata OTR/L [id number], 6/13/2020, 2:46 PM

## 2020-06-14 LAB
GLUCOSE BLD-MCNC: 102 MG/DL (ref 70–99)
GLUCOSE BLD-MCNC: 173 MG/DL (ref 70–99)
GLUCOSE BLD-MCNC: 178 MG/DL (ref 70–99)
GLUCOSE BLD-MCNC: 188 MG/DL (ref 70–99)
PERFORMED ON: ABNORMAL

## 2020-06-14 PROCEDURE — 6370000000 HC RX 637 (ALT 250 FOR IP): Performed by: PHYSICAL MEDICINE & REHABILITATION

## 2020-06-14 PROCEDURE — 1280000000 HC REHAB R&B

## 2020-06-14 PROCEDURE — 94760 N-INVAS EAR/PLS OXIMETRY 1: CPT

## 2020-06-14 RX ADMIN — APIXABAN 2.5 MG: 2.5 TABLET, FILM COATED ORAL at 08:14

## 2020-06-14 RX ADMIN — GLIPIZIDE 5 MG: 5 TABLET ORAL at 06:16

## 2020-06-14 RX ADMIN — ACETAMINOPHEN 650 MG: 325 TABLET, FILM COATED ORAL at 23:35

## 2020-06-14 RX ADMIN — DOCUSATE SODIUM 100 MG: 100 CAPSULE ORAL at 08:14

## 2020-06-14 RX ADMIN — INSULIN LISPRO 1 UNITS: 100 INJECTION, SOLUTION INTRAVENOUS; SUBCUTANEOUS at 20:34

## 2020-06-14 RX ADMIN — INSULIN LISPRO 1 UNITS: 100 INJECTION, SOLUTION INTRAVENOUS; SUBCUTANEOUS at 16:58

## 2020-06-14 RX ADMIN — LINAGLIPTIN 5 MG: 5 TABLET, FILM COATED ORAL at 08:14

## 2020-06-14 RX ADMIN — APIXABAN 2.5 MG: 2.5 TABLET, FILM COATED ORAL at 20:30

## 2020-06-14 RX ADMIN — LATANOPROST 1 DROP: 50 SOLUTION OPHTHALMIC at 20:30

## 2020-06-14 RX ADMIN — OXYCODONE 10 MG: 5 TABLET ORAL at 00:23

## 2020-06-14 RX ADMIN — AMLODIPINE BESYLATE 5 MG: 5 TABLET ORAL at 08:14

## 2020-06-14 RX ADMIN — HYDRALAZINE HYDROCHLORIDE 50 MG: 25 TABLET, FILM COATED ORAL at 20:31

## 2020-06-14 RX ADMIN — DESMOPRESSIN ACETATE 40 MG: 0.2 TABLET ORAL at 08:14

## 2020-06-14 RX ADMIN — SENNOSIDES 17.2 MG: 8.6 TABLET, FILM COATED ORAL at 20:30

## 2020-06-14 RX ADMIN — OXYCODONE 5 MG: 5 TABLET ORAL at 20:31

## 2020-06-14 RX ADMIN — INSULIN LISPRO 1 UNITS: 100 INJECTION, SOLUTION INTRAVENOUS; SUBCUTANEOUS at 13:37

## 2020-06-14 RX ADMIN — SENNOSIDES 17.2 MG: 8.6 TABLET, FILM COATED ORAL at 08:14

## 2020-06-14 ASSESSMENT — PAIN SCALES - GENERAL
PAINLEVEL_OUTOF10: 0
PAINLEVEL_OUTOF10: 8
PAINLEVEL_OUTOF10: 7
PAINLEVEL_OUTOF10: 0
PAINLEVEL_OUTOF10: 4

## 2020-06-14 ASSESSMENT — PAIN DESCRIPTION - PAIN TYPE
TYPE: ACUTE PAIN
TYPE: ACUTE PAIN

## 2020-06-14 ASSESSMENT — PAIN DESCRIPTION - LOCATION
LOCATION: LEG
LOCATION: LEG

## 2020-06-14 ASSESSMENT — PAIN DESCRIPTION - ORIENTATION
ORIENTATION: RIGHT
ORIENTATION: RIGHT

## 2020-06-14 NOTE — PROGRESS NOTES
Dejah Lomas  6/14/2020  [id number]    Chief Complaint: Closed fracture of right tibial plateau    Subjective:   No overnight events. No current complaints. Pain controlled. ROS: No CP, SOB, dyspnea    Objective:  Patient Vitals for the past 24 hrs:   BP Temp Temp src Pulse Resp SpO2 Weight   06/14/20 0053 -- -- -- -- -- -- 149 lb 11.2 oz (67.9 kg)   06/13/20 2015 (!) 158/71 98 °F (36.7 °C) Oral 73 16 96 % --   06/13/20 1330 (!) 155/53 97.6 °F (36.4 °C) Oral 67 16 -- --   06/13/20 0934 (!) 155/53 -- -- -- -- -- --     Gen: No distress, pleasant. Resting in bed  HEENT: Normocephalic, atraumatic. CV: Regular rate and rhythm. No MRG   Resp: No respiratory distress. CTAB   Abd: Soft, nontender nondistended  Ext: No edema. Neuro: Alert, oriented, appropriately interactive. : davis in place    Laboratory data: Available via EMR. Therapy progress:  PT  Required Braces or Orthoses  Right Lower Extremity Brace: Knee Immobilizer  Right Upper Extremity Brace/Splint: Pt wears R UE splint for support w/WB; broke wrist 4/2019  Position Activity Restriction  Other position/activity restrictions: The pt had a fall on to her R knee on 6/9. Found to have lateral tibial plateau and fibular head fractures. Will be treated conservatively with knee immobilizer and NWB. Objective     Sit to Stand: 2 Person Assistance, Moderate Assistance(Max A x 2 from bed)  Stand to sit: Moderate Assistance, 2 Person Assistance  Bed to Chair: 2 Person Assistance(Max A x 2 )     OT  PT Equipment Recommendations  Equipment Needed: Yes  Mobility Devices: Wheelchair  Wheelchair: Light Weight  Other: The pt will need a w/c as she is NWB s/p tibial plateau fracture. She has a transport chair but then has to rely on family to propel her through the home.     Toilet - Technique: Stand pivot  Equipment Used: Standard bedside commode  Toilet Transfers Comments: max A of 1/mod A of 1   Assessment        SLP                Body mass index is

## 2020-06-14 NOTE — PLAN OF CARE
Problem: Falls - Risk of:  Goal: Will remain free from falls  Description: Will remain free from falls  6/14/2020 0133 by Prashant Carr RN  Outcome: Ongoing     Problem: Falls - Risk of:  Goal: Absence of physical injury  Description: Absence of physical injury  6/13/2020 1341 by Parvin Brewster RN  Outcome: Ongoing     Problem: Pain:  Goal: Pain level will decrease  Description: Pain level will decrease  6/14/2020 0133 by Prashant Carr RN  Outcome: Ongoing     Problem: Pain:  Goal: Control of acute pain  Description: Control of acute pain  6/14/2020 0133 by Prashant Carr RN  Outcome: Ongoing     Problem: Pain:  Goal: Control of chronic pain  Description: Control of chronic pain  6/14/2020 0133 by Prashant Carr RN  Outcome: Ongoing     Problem: IP BLADDER/VOIDING  Goal: LTG - patient will achieve acceptable level of continence  6/14/2020 0133 by Prashant Carr RN  Outcome: Ongoing     Problem: IP BOWEL ELIMINATION  Goal: LTG - patient will have regular and routine bowel evacuation  6/14/2020 0133 by Prashant Carr RN  Outcome: Ongoing     Problem: SKIN INTEGRITY  Goal: STG - patient will maintain good skin integrity  6/14/2020 0133 by Prashant Carr RN  Outcome: Ongoing

## 2020-06-15 LAB
ANION GAP SERPL CALCULATED.3IONS-SCNC: 8 MMOL/L (ref 3–16)
BUN BLDV-MCNC: 19 MG/DL (ref 7–20)
CALCIUM SERPL-MCNC: 9 MG/DL (ref 8.3–10.6)
CHLORIDE BLD-SCNC: 104 MMOL/L (ref 99–110)
CO2: 24 MMOL/L (ref 21–32)
CREAT SERPL-MCNC: 1.1 MG/DL (ref 0.6–1.2)
GFR AFRICAN AMERICAN: 57
GFR NON-AFRICAN AMERICAN: 47
GLUCOSE BLD-MCNC: 152 MG/DL (ref 70–99)
GLUCOSE BLD-MCNC: 198 MG/DL (ref 70–99)
GLUCOSE BLD-MCNC: 291 MG/DL (ref 70–99)
GLUCOSE BLD-MCNC: 71 MG/DL (ref 70–99)
GLUCOSE BLD-MCNC: 78 MG/DL (ref 70–99)
HCT VFR BLD CALC: 26.2 % (ref 36–48)
HEMOGLOBIN: 8.4 G/DL (ref 12–16)
MCH RBC QN AUTO: 25.2 PG (ref 26–34)
MCHC RBC AUTO-ENTMCNC: 31.9 G/DL (ref 31–36)
MCV RBC AUTO: 79.1 FL (ref 80–100)
PDW BLD-RTO: 16.7 % (ref 12.4–15.4)
PERFORMED ON: ABNORMAL
PERFORMED ON: NORMAL
PLATELET # BLD: 261 K/UL (ref 135–450)
PMV BLD AUTO: 9 FL (ref 5–10.5)
POTASSIUM SERPL-SCNC: 4.2 MMOL/L (ref 3.5–5.1)
RBC # BLD: 3.32 M/UL (ref 4–5.2)
SODIUM BLD-SCNC: 136 MMOL/L (ref 136–145)
WBC # BLD: 7.5 K/UL (ref 4–11)

## 2020-06-15 PROCEDURE — 97535 SELF CARE MNGMENT TRAINING: CPT

## 2020-06-15 PROCEDURE — 1280000000 HC REHAB R&B

## 2020-06-15 PROCEDURE — 97530 THERAPEUTIC ACTIVITIES: CPT

## 2020-06-15 PROCEDURE — 6370000000 HC RX 637 (ALT 250 FOR IP): Performed by: PHYSICAL MEDICINE & REHABILITATION

## 2020-06-15 PROCEDURE — 93971 EXTREMITY STUDY: CPT

## 2020-06-15 PROCEDURE — 85027 COMPLETE CBC AUTOMATED: CPT

## 2020-06-15 PROCEDURE — 80048 BASIC METABOLIC PNL TOTAL CA: CPT

## 2020-06-15 RX ORDER — BISACODYL 10 MG
10 SUPPOSITORY, RECTAL RECTAL DAILY PRN
Status: DISCONTINUED | OUTPATIENT
Start: 2020-06-15 | End: 2020-07-01 | Stop reason: HOSPADM

## 2020-06-15 RX ADMIN — MAGNESIUM HYDROXIDE 30 ML: 400 SUSPENSION ORAL at 05:59

## 2020-06-15 RX ADMIN — SENNOSIDES 17.2 MG: 8.6 TABLET, FILM COATED ORAL at 22:18

## 2020-06-15 RX ADMIN — INSULIN LISPRO 1 UNITS: 100 INJECTION, SOLUTION INTRAVENOUS; SUBCUTANEOUS at 18:47

## 2020-06-15 RX ADMIN — SENNOSIDES 17.2 MG: 8.6 TABLET, FILM COATED ORAL at 08:15

## 2020-06-15 RX ADMIN — BISACODYL 10 MG: 10 SUPPOSITORY RECTAL at 18:11

## 2020-06-15 RX ADMIN — LINAGLIPTIN 5 MG: 5 TABLET, FILM COATED ORAL at 11:46

## 2020-06-15 RX ADMIN — APIXABAN 2.5 MG: 2.5 TABLET, FILM COATED ORAL at 08:15

## 2020-06-15 RX ADMIN — DOCUSATE SODIUM 100 MG: 100 CAPSULE ORAL at 08:15

## 2020-06-15 RX ADMIN — OXYCODONE 10 MG: 5 TABLET ORAL at 08:15

## 2020-06-15 RX ADMIN — GLIPIZIDE 5 MG: 5 TABLET ORAL at 05:59

## 2020-06-15 RX ADMIN — OXYCODONE 10 MG: 5 TABLET ORAL at 18:11

## 2020-06-15 RX ADMIN — INSULIN LISPRO 1 UNITS: 100 INJECTION, SOLUTION INTRAVENOUS; SUBCUTANEOUS at 11:46

## 2020-06-15 RX ADMIN — OXYCODONE 10 MG: 5 TABLET ORAL at 00:21

## 2020-06-15 RX ADMIN — AMLODIPINE BESYLATE 5 MG: 5 TABLET ORAL at 08:15

## 2020-06-15 RX ADMIN — APIXABAN 2.5 MG: 2.5 TABLET, FILM COATED ORAL at 22:17

## 2020-06-15 RX ADMIN — DESMOPRESSIN ACETATE 40 MG: 0.2 TABLET ORAL at 08:15

## 2020-06-15 ASSESSMENT — PAIN SCALES - GENERAL
PAINLEVEL_OUTOF10: 7
PAINLEVEL_OUTOF10: 0

## 2020-06-15 ASSESSMENT — PAIN DESCRIPTION - LOCATION: LOCATION: LEG

## 2020-06-15 ASSESSMENT — PAIN DESCRIPTION - PAIN TYPE: TYPE: ACUTE PAIN

## 2020-06-15 ASSESSMENT — PAIN DESCRIPTION - ORIENTATION: ORIENTATION: RIGHT

## 2020-06-15 NOTE — PROGRESS NOTES
Occupational Therapy  Facility/Department: [facility] ACUTE REHAB UNIT  Daily Treatment Note  NAME: Bartolome Rodríguez  : 1933  MRN: [id number]    Date of Service: 6/15/2020    Discharge Recommendations:  Home with Home health OT, S Level 3, Home with assist PRN  OT Equipment Recommendations  Equipment Needed: Yes  Other: has raised toilet seat, hip kit, will likely require w/c for mobility     Assessment   Performance deficits / Impairments: Decreased functional mobility ; Decreased balance;Decreased ADL status; Decreased endurance;Decreased high-level IADLs  Assessment: Pt is not at her baseline level of occupational function, based on the above deficits associated with R lateral tibia plateau fx. Pt would benefit from continued intensive skilled acute OT services to address these deficits. Treatment Diagnosis: Decreased ADL/IADL status, functional mobility, endurance and balance associated with R lateral tibia plateau fx  Prognosis: Good  OT Education: OT Role;Plan of Care;Transfer Training;ADL Adaptive Strategies  Patient Education: continue to reinforce for carryover  REQUIRES OT FOLLOW UP: Yes  Activity Tolerance  Activity Tolerance: Patient Tolerated treatment well  Safety Devices  Safety Devices in place: Yes  Type of devices: Left in chair;Call light within reach; Chair alarm in place; All fall risk precautions in place         Patient Diagnosis(es): Tibial fx     has a past medical history of Arthritis, Diabetes mellitus ([address] Hyperlipidemia, Hypertension, and Paroxysmal atrial fibrillation ([facility] [address] has a past surgical history that includes Cholecystectomy ().     Restrictions  Restrictions/Precautions  Restrictions/Precautions: Weight Bearing  Required Braces or Orthoses?: Yes  Lower Extremity Weight Bearing Restrictions  Right Lower Extremity Weight Bearing: Non Weight Bearing  Required Braces or Orthoses  Right Lower Extremity Brace: Knee Immobilizer  Right Upper Extremity Brace/Splint: Pt wears R UE splint for support w/WB; broke wrist 4/2019  Position Activity Restriction  Other position/activity restrictions: The pt had a fall on to her R knee on 6/9. Found to have lateral tibial plateau and fibular head fractures. Will be treated conservatively with knee immobilizer and NWB. Subjective   General  Chart Reviewed: Yes  Patient assessed for rehabilitation services?: Yes  Response to previous treatment: Patient with no complaints from previous session  Family / Caregiver Present: No  Referring Practitioner: Greg Stone DO, for d/c planning  Diagnosis: L lateral tibia plateau fx  Subjective  Subjective: Pt in bed at entry, agreeable to OT/PT session  General Comment  Comments: José Miguel Nieto Vital Signs  Patient Currently in Pain: Yes(note rated numberically, pt reported decreased pain w/ adjustment of KI and OOB activity)        Objective    ADL  Grooming: Setup;Supervision(oral care and face washing w/c level at sink)  UE Dressing: Setup;Stand by assistance(seated EOB)  LE Dressing: Dependent/Total(assist to thread LEs, 2 person assist ro don clothing over hips w/ 1 person for standing balance and second for clothing management)  Toileting: Dependent/Total(1 person for stance and 2nd for clothing management, pt unable to void)  Additional Comments: pt assisted to/from bathroom via w/c for ADL needs. Balance  Sitting Balance: Stand by assistance(SBA EOB, SUP in armed chair)  Standing Balance:  Moderate assistance  Standing Balance  Time: ~6mins total  Activity: functional transfers, ADL completion, 2 stances in parallel bars  Comment: 2 stances in parallel bar w/ ModA for sit<>stand, pt pivot to L during first stance and was able to pivot back to face forward requiring assist to lower down to chair, 2nd stance static for positioning in w/c   Functional Mobility  Functional Mobility Comments: pt unable to take steps, pivots on L LE completed while in parallel bar  Toilet Transfers  Toilet - Technique: Stand functional mobility  Current Treatment Recommendations: Functional Mobility Training, Safety Education & Training, Balance Training, Self-Care / ADL, Endurance Training, Wheelchair Mobility Training, Patient/Caregiver Education & Training          Goals  Short term goals  Time Frame for Short term goals: 3 weeks   Short term goal 1: Mod I functional transfers to toilet and shower   Short term goal 2: mod I UB bathing/dressing  Short term goal 3: min A LB bathing/dressing   Short term goal 4: min A toileting   Short term goal 5: mod I functional mobility from w/c level for ADLs/IADLs  Patient Goals   Patient goals : \"to return home\"        Therapy Time   Individual Concurrent Group Co-treatment   Time In       0830   Time Out       0915   Minutes       39       Second Session Therapy Time:   Individual Concurrent Group Co-treatment   Time In       5893   Time Out       1400   Minutes       45       Timed Code Treatment Minutes:  45 + 45  Total Treatment Minutes:  90 min total     co-tx completed on this date to maximize functional mobility and maintain patient safety    Tone Villanueva, [address] [facility], [place] [facility] [address]

## 2020-06-15 NOTE — PLAN OF CARE
Problem: Falls - Risk of:  Goal: Will remain free from falls  Description: Will remain free from falls  6/15/2020 0151 by Danette Barry RN  Outcome: Ongoing     Problem: Falls - Risk of:  Goal: Absence of physical injury  Description: Absence of physical injury  6/15/2020 0151 by Danette Barry RN  Outcome: Ongoing     Problem: Pain:  Goal: Pain level will decrease  Description: Pain level will decrease  6/15/2020 0151 by Danette Barry RN  Outcome: Ongoing     Problem: Pain:  Goal: Control of acute pain  Description: Control of acute pain  6/15/2020 0151 by Danette Barry RN  Outcome: Ongoing     Problem: Pain:  Goal: Control of chronic pain  Description: Control of chronic pain  6/15/2020 0151 by Danette Barry RN  Outcome: Ongoing

## 2020-06-15 NOTE — PROGRESS NOTES
Physical Therapy  Facility/Department: [facility] ACUTE REHAB UNIT  Daily Treatment Note  NAME: Ulises Quispe  : 1933  MRN: [id number]    Date of Service: 6/15/2020    Discharge Recommendations:  Home with Home health PT, 24 hour supervision or assist, S Level 3   PT Equipment Recommendations  Equipment Needed: Yes  Mobility Devices: Wheelchair  Wheelchair: Light Weight  Other: The pt will need a w/c as she is NWB s/p tibial plateau fracture. She has a transport chair which she is unable to self propel    Assessment   Body structures, Functions, Activity limitations: Decreased functional mobility ; Decreased ROM; Decreased strength;Decreased balance;Decreased endurance; Increased pain;Decreased ADL status; Decreased safe awareness  Assessment: Pt presenting with improved bed mobility, transfers, and w/c mobility. Pt continues to require assist of 2 for safe completion of surface to surface transfers and remains non ambulatory. Pt will benefit from continued skilled therapy services to improve functional mobility level. Treatment Diagnosis: Impaired balance, impaired functional mobility  Prognosis: Good  PT Education: Goals;Transfer Training;PT Role;Plan of Care;General Safety; Functional Mobility Training;Gait Training;Weight-bearing Education  Patient Education: pt verbalized understanding - needs reinforcement  Barriers to Learning: hearing  REQUIRES PT FOLLOW UP: Yes  Activity Tolerance  Activity Tolerance: Patient Tolerated treatment well  Activity Tolerance: NWB status limiting functional mobility completion. Patient Diagnosis(es): (R) tibial plateau fracture     has a past medical history of Arthritis, Diabetes mellitus ([facility] [address] Hyperlipidemia, Hypertension, and Paroxysmal atrial fibrillation ([facility] [address] has a past surgical history that includes Cholecystectomy ().     Restrictions  Restrictions/Precautions  Restrictions/Precautions: Weight Bearing  Required Braces or Orthoses?: Yes  Lower Extremity Weight Bearing Restrictions  Right Lower Extremity Weight Bearing: Non Weight Bearing  Required Braces or Orthoses  Right Lower Extremity Brace: Knee Immobilizer  Right Upper Extremity Brace/Splint: Pt wears R UE splint for support w/WB; broke wrist 4/2019  Position Activity Restriction  Other position/activity restrictions: 26-year-old female with a history of diabetes, hyperlipidemia, hypertension, and A. fib on Eliquis who was admitted on 6/10 with right knee pain after falling down a few stairs. X-ray revealed a comminuted and mildly depressed fracture involving the lateral tibial plateau as well as a proximal fracture of the fibular head. Ortho evaluated and suggested conservative management with nonweightbearing in the right lower extremity. Therapy evaluated and suggested she continue in an inpatient setting prior to returning home. She reports her pain is controlled. She lives in a quad level home with multiple half staircases. Subjective   General  Chart Reviewed: Yes  Family / Caregiver Present: No  Referring Practitioner: Dr. Alyce Morales  Subjective  Subjective: Patient reporting severe (R) calf/heel pain. MD notified, reports plan for imaging to evaluate for potential DVT. General Comment  Comments: Patient supine in bed upon arrival, agreeable to therapy session. Cognition   Cognition  Arousal/Alertness: Appropriate responses to stimuli  Following Commands:  Follows one step commands with repetition  Safety Judgement: Decreased awareness of need for assistance  Initiation: Requires cues for some  Sequencing: Requires cues for some  Cognition Comment: pt Ewi[place]ayp    Objective   Bed mobility  Supine to Sit: Minimal assistance(assistance for (R) LE)  Scooting: Contact guard assistance  Comment: HOB elevated with use of bed rails  Transfers  Sit to Stand: 2 Person Assistance(mod (A) of 1 + min (A) of 1)  Stand to sit: 2 Person Assistance  Stand Pivot Transfers: 2 Person Assistance(mod (A) of 1 + min (A) of 1 bed => w/c)  Comment: toilet transfer with use of grab bar: mod (A) of 1 + CGA of 1. Dependent LB clothing management. Pt self maintains restricted WB status during all transfers with VC. Ambulation  Ambulation?: No(attempted in // bars, unable to complete)     Balance  Posture: Fair  Sitting - Static: Good  Sitting - Dynamic: Fair;+  Standing - Static: Poor  Standing - Dynamic: Poor;-     Comment: 1st session:  See above functional mobility. In addition patient completes sit <=> stand transfer x 2 completions in // bars at max (A) of 1 + CGA of 1. Pivot transfer training completed in // bars at mod (A) of 1 + CGA. 2nd session:  Pt seated in recliner upon arrival, agreeable to PT session. Squat pivot transfer recliner <=> w/c completed at mod (A) of 2. Toilet transfer completed at max (A) of 1 + CGA with dependent LB clothing management. Sit <=> stand transfer training completed from w/c => RW at mod (A) of 2 progressing to max (A) of 1 with total of 4 completions. Stand pivot transfer w/c <=> standard chair with use of RW x 2 x completions at mod (A) of 1 + min (A) of 1. Upon return to room, pt remains up in recliner with chair alarm and call light within reach. No new complaints following session.     Goals  Short term goals  Time Frame for Short term goals: 1 week  Short term goal 1: Supine to/from sit with Min A  Short term goal 2: Sit to/from stand with Mod A x 1  Short term goal 3: Propel w/c x 76' with Min A - goal met 6/15/2020  Short term goal 4: Amb x 1 lap in 11 bars with Mod A  Long term goals  Time Frame for Long term goals : 2 weeks  Long term goal 1: Supine to/from sit with Mod I  Long term goal 2: Sit to/from stand with Mod I  Long term goal 3: Bed to/from w/c with Supervision  Long term goal 4: Amb. x 25' with FWW and Min A  Long term goal 5: Propel w/c x 150' with Mod I  Long term goal 6: Car transfer with Supevision  Long term goal 7: Up and down 4 steps backward with Min A   Patient Goals   Patient goals : Improve mobility    Plan    Plan  Times per week: 90 minutes/day, 5 days/wk  Current Treatment Recommendations: ROM, Balance Training, Functional Mobility Training, Transfer Training, Neuromuscular Re-education, Endurance Training, Safety Education & Training, Gait Training, Equipment Evaluation, Education, & procurement, Patient/Caregiver Education & Training, Stair training, Home Exercise Program, ADL/Self-care Training, Strengthening, Pain Management, Modalities  Safety Devices  Type of devices:  All fall risk precautions in place, Call light within reach, Left in chair, Chair alarm in place, Gait belt  Restraints  Initially in place: No     Therapy Time   Individual Concurrent Group Co-treatment   Time In 0830         Time Out 0915         Minutes 45         Timed Code Treatment Minutes: [address] [place] Time:   Individual Concurrent Group Co-treatment   Time In 1315         Time Out 1400         Minutes 45           Timed Code Treatment Minutes:  45 + 45     Total Treatment Minutes:  90 minutes    Ingris Dunbar PT, DPT - [id number]

## 2020-06-15 NOTE — PLAN OF CARE
Problem: Falls - Risk of:  Goal: Will remain free from falls  Description: Will remain free from falls  Outcome: Ongoing     Problem: Pain:  Description: Pain management should include both nonpharmacologic and pharmacologic interventions.   Goal: Pain level will decrease  Description: Pain level will decrease  Outcome: Ongoing     Problem: IP BLADDER/VOIDING  Goal: LTG - patient will achieve acceptable level of continence  Outcome: Ongoing     Problem: IP BOWEL ELIMINATION  Goal: LTG - patient will have regular and routine bowel evacuation  Outcome: Ongoing     Problem: SKIN INTEGRITY  Goal: STG - patient will maintain good skin integrity  Outcome: Ongoing

## 2020-06-15 NOTE — CARE COORDINATION
Social Work Admission Assessment    Objective:  Met with pt to complete initial assessment and review role of  in rehab process. Pt oriented to unit. Pt states understanding of this. Current Home Situation:  Patient lives at home w/spouse     Pt's plans re:  Return to work/school/volunteer: stated she plays dominoes and cards, no volunteer work    Accessibility to [employer] resources/transportation:  Pt stated if she cannot drive, her spouse will take her    Has pt experienced a recent loss or signigicant life event that would impact their care or ability to participate? __No  x__Yes - Explain-Pt admitted for right tibial plateau fracture-stated she is not a depressed person, but is upset about reason for admission. Has pt ever been treated for emotional disorders? _x_No  __Yes--How does that affect current situation: n/a    How does pt and family cope with stressful events and this hospitalization? Pt looks to family for support, but stated overall she is not a depressed/stressed person. Special Problem Areas:  Stated none    Discharge Plan: Pt stated that she plans to dc to home-stated that if home care is recommended on d/c she prefers [facility]. Pt stated she has plenty of equipment-has 3 walkers. Impression/Plan: Maru Botello is an 80year old female admitted after falling down stairs at home and fracturing right tibial plateau. Home w/[facility] if home care recommended and referral made to [facility] for wheelchair.     Electronically signed by NIKOLE Moeller on 6/15/2020 at 4:15 PM

## 2020-06-15 NOTE — PROGRESS NOTES
Dejah Lomas  6/15/2020  [id number]    Chief Complaint: Closed fracture of right tibial plateau    Subjective:   No overnight events. No current complaints. Pain increased in right calf. Labs reviewed. ROS: No CP, SOB, dyspnea    Objective:  Patient Vitals for the past 24 hrs:   BP Temp Temp src Pulse Resp SpO2   06/15/20 0800 (!) 151/62 97.9 °F (36.6 °C) -- 67 18 94 %   06/14/20 2200 (!) 154/63 -- -- -- -- --   06/14/20 2015 (!) 181/70 97.9 °F (36.6 °C) Oral 66 18 98 %   06/14/20 1624 -- -- -- -- 18 96 %   06/14/20 1400 (!) 167/63 97.7 °F (36.5 °C) Oral 67 18 --     Gen: No distress, pleasant. Resting in bed  HEENT: Normocephalic, atraumatic. CV: Regular rate and rhythm. No MRG   Resp: No respiratory distress. CTAB   Abd: Soft, nontender nondistended  Ext: Mild RLE edema, positive R samanta sign  Neuro: Alert, oriented, appropriately interactive. Laboratory data: Available via EMR. Therapy progress:  PT  Required Braces or Orthoses  Right Lower Extremity Brace: Knee Immobilizer  Right Upper Extremity Brace/Splint: Pt wears R UE splint for support w/WB; broke wrist 4/2019  Position Activity Restriction  Other position/activity restrictions: The pt had a fall on to her R knee on 6/9. Found to have lateral tibial plateau and fibular head fractures. Will be treated conservatively with knee immobilizer and NWB. Objective     Sit to Stand: 2 Person Assistance, Moderate Assistance(Max A x 2 from bed)  Stand to sit: Moderate Assistance, 2 Person Assistance  Bed to Chair: 2 Person Assistance(Max A x 2 )     OT  PT Equipment Recommendations  Equipment Needed: Yes  Mobility Devices: Wheelchair  Wheelchair: Light Weight  Other: The pt will need a w/c as she is NWB s/p tibial plateau fracture. She has a transport chair but then has to rely on family to propel her through the home.     Toilet - Technique: Stand pivot  Equipment Used: Standard bedside commode  Toilet Transfers Comments: max A of 1/mod A of 1 Assessment        SLP                Body mass index is 26.52 kg/m². Assessment:  Patient Active Problem List   Diagnosis    Paroxysmal atrial fibrillation (HCC)    Closed fracture of lateral portion of right tibial plateau    Chronic anticoagulation    Closed fracture of upper end of right fibula    Fall on stairs    Closed fracture of right tibial plateau       Plan:   Right tibial plateau fracture: NWB in immobilizer. Pain control. PT/OT     Proximal right fibular fracture: as above     Afib: eliquis     HTN: norvasc 5     HLD: lipitor 40     DM: glipizide 5, tradjenta 5, SSI    Right calf pain: - on Eliquis, check DVT US     Bowels: Per protocol  Bladder: Per protocol  Sleep: Trazodone provided prn. Pain: tylenol, braxton PRN   DVT PPx: eliquis     Gaye Moncada MD 6/15/2020, 8:46 AM    * This document was created using dictation software. While all precautions were taken to ensure accuracy, errors may have occurred. Please disregard any typographical errors.

## 2020-06-15 NOTE — PROGRESS NOTES
Admission Period/Goal QM Codes    QM Admit Code Goal Code   Eating 5 6   Oral Hygiene 4 6   Toileting Hygiene 1 4   Shower/Bathing 2 4   UB Dressing 3 6   LB Dressing 1 4   Putting on/off Footwear 1 4   Rolling Left and Right 3 6   Sit To Lying 3 6   Lying to Sitting on Bedside 3 6   Sit to Stand 1 6   Chair/Bed to Chair Transfer 1 4   Toilet Transfers 88 -   Car Transfers 88 4   Walk 10 Feet 88 3   Walk 50 Feet with Two Turns 88 1   Walk 150 Feet 88 1   Walk 10 Feet on Uneven Surfaces 88 -   1 Step (Curb) 88 3   4 Steps 88 3   12 Steps 88 -   Picking up Object from Floor 88 4   Wheel 50 Feet with 2 Turns 3 6   Type manual    Wheel 150 Feet 88 6   Type manual        Following discussion at the Quality Measure Huddle, the above codes were determined to be the patient's usual performance at admission. OT:  Stewart Prather OTR/L [id number], 6/15/2020, 2:37 PM     PT:  Cristina Little PT, DPT - [id number], 6/15/2020, 1505     RN:  TRACY SkinnerN, CRRN 6/15/20, 1200     : Anna King, [address] [place] [address], 6/15/2020 1012

## 2020-06-16 LAB
GLUCOSE BLD-MCNC: 137 MG/DL (ref 70–99)
GLUCOSE BLD-MCNC: 154 MG/DL (ref 70–99)
GLUCOSE BLD-MCNC: 240 MG/DL (ref 70–99)
GLUCOSE BLD-MCNC: 87 MG/DL (ref 70–99)
PERFORMED ON: ABNORMAL
PERFORMED ON: NORMAL

## 2020-06-16 PROCEDURE — 1280000000 HC REHAB R&B

## 2020-06-16 PROCEDURE — 97535 SELF CARE MNGMENT TRAINING: CPT

## 2020-06-16 PROCEDURE — 97530 THERAPEUTIC ACTIVITIES: CPT

## 2020-06-16 PROCEDURE — 97110 THERAPEUTIC EXERCISES: CPT

## 2020-06-16 PROCEDURE — 6370000000 HC RX 637 (ALT 250 FOR IP): Performed by: PHYSICAL MEDICINE & REHABILITATION

## 2020-06-16 RX ADMIN — AMLODIPINE BESYLATE 5 MG: 5 TABLET ORAL at 08:06

## 2020-06-16 RX ADMIN — OXYCODONE 10 MG: 5 TABLET ORAL at 21:17

## 2020-06-16 RX ADMIN — SENNOSIDES 17.2 MG: 8.6 TABLET, FILM COATED ORAL at 08:06

## 2020-06-16 RX ADMIN — DOCUSATE SODIUM 100 MG: 100 CAPSULE ORAL at 08:06

## 2020-06-16 RX ADMIN — LATANOPROST 1 DROP: 50 SOLUTION OPHTHALMIC at 21:17

## 2020-06-16 RX ADMIN — APIXABAN 2.5 MG: 2.5 TABLET, FILM COATED ORAL at 21:17

## 2020-06-16 RX ADMIN — INSULIN LISPRO 1 UNITS: 100 INJECTION, SOLUTION INTRAVENOUS; SUBCUTANEOUS at 12:29

## 2020-06-16 RX ADMIN — GLIPIZIDE 5 MG: 5 TABLET ORAL at 06:13

## 2020-06-16 RX ADMIN — SENNOSIDES 17.2 MG: 8.6 TABLET, FILM COATED ORAL at 21:17

## 2020-06-16 RX ADMIN — LINAGLIPTIN 5 MG: 5 TABLET, FILM COATED ORAL at 10:33

## 2020-06-16 RX ADMIN — OXYCODONE 10 MG: 5 TABLET ORAL at 08:06

## 2020-06-16 RX ADMIN — DESMOPRESSIN ACETATE 40 MG: 0.2 TABLET ORAL at 08:06

## 2020-06-16 RX ADMIN — INSULIN LISPRO 1 UNITS: 100 INJECTION, SOLUTION INTRAVENOUS; SUBCUTANEOUS at 21:21

## 2020-06-16 RX ADMIN — APIXABAN 2.5 MG: 2.5 TABLET, FILM COATED ORAL at 08:06

## 2020-06-16 ASSESSMENT — PAIN DESCRIPTION - ORIENTATION
ORIENTATION: RIGHT
ORIENTATION: RIGHT;LEFT

## 2020-06-16 ASSESSMENT — PAIN DESCRIPTION - PAIN TYPE
TYPE: ACUTE PAIN
TYPE: ACUTE PAIN

## 2020-06-16 ASSESSMENT — PAIN SCALES - GENERAL
PAINLEVEL_OUTOF10: 0
PAINLEVEL_OUTOF10: 6
PAINLEVEL_OUTOF10: 0
PAINLEVEL_OUTOF10: 7

## 2020-06-16 ASSESSMENT — PAIN DESCRIPTION - LOCATION
LOCATION: LEG
LOCATION: LEG

## 2020-06-16 NOTE — PATIENT CARE CONFERENCE
[facility]  Inpatient Rehabilitation  Weekly Team Conference Note    Patient Name: Reji Schwartz        MRN: [id number]    : 1933  (80 y.o.)  Gender: female   Referring Practitioner: Dr. Maria M Villarreal  Diagnosis: fall, tibial plateau and fibular head fractures    The team conference for this patient was held on 2020 at 11:00am by:  Hilaria Sanchez MD    CASE MANAGEMENT:  Assessment: Patient lives at home w/spouse. PSYCHOLOGY:  Assessment:     PHYSICAL THERAPY:    Bed Mobility:   Scooting: Contact guard assistance    Transfers:  Sit to Stand: 2 Person Assistance(mod (A) of 1 + min (A) of 1)  Stand to sit: 2 Person Assistance  Bed to Chair: 2 Person Assistance(Max A x 2 )  Comment: 1st session:  See above functional mobility. In addition patient completes sit <=> stand transfer x 2 completions in // bars at max (A) of 1 + CGA of 1. Pivot transfer training completed in // bars at mod (A) of 1 + CGA.           QM:  Roll Left and Right  Assistance Needed: Partial/moderate assistance  CARE Score: 3  Discharge Goal: Independent  Sit to Lying  Assistance Needed: Partial/moderate assistance  CARE Score: 3  Discharge Goal: Independent  Lying to Sitting on Side of Bed  Assistance Needed: Partial/moderate assistance  Physical Assistance Level: 25%-49%  CARE Score: 3  Discharge Goal: Independent  Sit to Stand  Assistance Needed: Dependent  CARE Score: 1  Discharge Goal: Independent  Chair/Bed-to-Chair Transfer  Assistance Needed: Dependent  CARE Score: 1  Discharge Goal: Supervision or touching assistance  Car Transfer  Reason if not Attempted: Not attempted due to medical condition or safety concerns  CARE Score: 88  Discharge Goal: Supervision or touching assistance  Walk 10 Feet  Reason if not Attempted: Not attempted due to medical condition or safety concerns  CARE Score: 88  Discharge Goal: Partial/moderate assistance  Walk 50 Feet with Two Turns  Reason if not Attempted: Not attempted due to medical condition or safety concerns  CARE Score: 88  Discharge Goal: Dependent  Walk 150 Feet  Reason if not Attempted: Not attempted due to medical condition or safety concerns  CARE Score: 88  Discharge Goal: Dependent  Walking 10 Feet on Uneven Surfaces  Reason if not Attempted: Not attempted due to medical condition or safety concerns  CARE Score: 88  1 Step (Curb)  Reason if not Attempted: Not attempted due to medical condition or safety concerns  CARE Score: 88  Discharge Goal: Partial/moderate assistance  4 Steps  Reason if not Attempted: Not attempted due to medical condition or safety concerns  CARE Score: 88  Discharge Goal: Partial/moderate assistance  12 Steps  Reason if not Attempted: Not attempted due to medical condition or safety concerns  CARE Score: 88  Discharge Goal: Not Applicable  Picking Up Object  Reason if not Attempted: Not attempted due to medical condition or safety concerns  CARE Score: 88  Discharge Goal: Supervision or touching assistance  Wheelchair Ability  Uses a Wheelchair and/or Scooter?: Yes    SPEECH THERAPY:    Diet Level:DIET CARB CONTROL; Dietary Nutrition Supplements: Diabetic Oral Supplement    Assessment:         OCCUPATIONAL THERAPY:    ADL:   ADL  Feeding: Setup, Modified independent (lower dentures )  Grooming: Setup, Supervision(oral care and face washing w/c level at sink)  UE Bathing: Contact guard assistance, Setup  LE Bathing: Maximum assistance, Setup(assist with legs and pericare )  UE Dressing: Setup, Stand by assistance(seated EOB)  LE Dressing: Dependent/Total(assist to thread LEs, 2 person assist ro don clothing over hips w/ 1 person for standing balance and second for clothing management)  Toileting: Dependent/Total(1 person for stance and 2nd for clothing management, pt unable to void)  Additional Comments: pt assisted to/from bathroom via w/c for ADL needs. Toilet Transfers:   Toilet Transfers  Toilet - Technique: Stand pivot  Equipment Used: health  Pass:No  Services at Discharge: [address], [facility] OT S3  Equipment at Discharge: w/c, pt reports owning needed ADL devices (hip kit, shower chair)  Factors facilitating achievement of predicted outcomes: motivated to improve,   Barriers to the achievement of predicted outcomes: history of falling, pain  Patient Goals:Improve mobility, \"to return home\" ,     Rehab Team Members in attendance for Team Conference:  Ene Guerrero MSW, LSW  Stanton Reyna RD, MARY KATE    [facility], [place]. D, Neuropsychologist    Murtaza Cohen, OTR/L      Tim Mayers, PT, DPT    Johny Sarkar, BSN, RN, [facility] [address],     I approve the established interdisciplinary plan of care as documented within the medical record of [address] [place] [address].     Reyna Mack MD  Electronically signed by Reyna Mack MD on 6/16/2020 at 2:09 PM

## 2020-06-16 NOTE — PROGRESS NOTES
Dejah Lomas  6/16/2020  [id number]    Chief Complaint: Closed fracture of right tibial plateau    Subjective:   No overnight events. No current complaints. Doppler negative. BP and glucose labile but overall appropriate. ROS: No CP, SOB, dyspnea    Objective:  Patient Vitals for the past 24 hrs:   BP Temp Temp src Pulse Resp SpO2 Weight   06/16/20 0745 (!) 180/64 97.9 °F (36.6 °C) -- 69 19 -- --   06/15/20 2100 (!) 152/53 97.9 °F (36.6 °C) Oral 69 19 96 % 151 lb 11.2 oz (68.8 kg)     Gen: No distress, pleasant. Resting in WC  HEENT: Normocephalic, atraumatic. CV: Regular rate and rhythm. No MRG   Resp: No respiratory distress. CTAB   Abd: Soft, nontender nondistended  Ext: Mild RLE edema, calf pain remains, RLE in immobilizer  Neuro: Alert, oriented, appropriately interactive. Laboratory data: Available via EMR. Therapy progress:  PT  Required Braces or Orthoses  Right Lower Extremity Brace: Knee Immobilizer  Right Upper Extremity Brace/Splint: Pt wears R UE splint for support w/WB; broke wrist 4/2019  Position Activity Restriction  Other position/activity restrictions: 51-year-old female with a history of diabetes, hyperlipidemia, hypertension, [facility] with right knee pain after falling down a few stairs. X-ray revealed a comminuted and mildly depressed fracture involving the lateral tibial plateau as well as a proximal fracture of the fibular head. Ortho evaluated and suggested conservative management with nonweightbearing in the right lower extremity. Therapy evaluated and suggested she continue in an inpatient setting prior to returning home. She reports her pain is controlled. She lives in a quad level home with multiple half staircases.   Objective     Sit to Stand: 2 Person Assistance(mod (A) of 1 + min (A) of 1)  Stand to sit: 2 Person Assistance  Bed to Chair: 2 Person Assistance(Max A x 2 )     OT  PT Equipment Recommendations  Equipment Needed: Yes  Mobility Devices: Wheelchair  Wheelchair: Light Weight  Other: The pt will need a w/c as she is NWB s/p tibial plateau fracture. She has a transport chair which she is unable to self propel  Toilet - Technique: Stand pivot  Equipment Used: Standard toilet  Toilet Transfers Comments: ModA of 1 + CGA to commode, MaxA of 1 + Jennifer of 1 from commode. use of grab bar  Assessment        SLP                Body mass index is 26.87 kg/m². Assessment:  Patient Active Problem List   Diagnosis    Paroxysmal atrial fibrillation (HCC)    Closed fracture of lateral portion of right tibial plateau    Chronic anticoagulation    Closed fracture of upper end of right fibula    Fall on stairs    Closed fracture of right tibial plateau       Plan:   Right tibial plateau fracture: NWB in immobilizer. Pain control. PT/OT     Proximal right fibular fracture: as above     Afib: eliquis     HTN: norvasc 5     HLD: lipitor 40     DM: glipizide 5, tradjenta 5, SSI    Right calf pain: - negative DVT US     Bowels: Per protocol  Bladder: Per protocol  Sleep: Trazodone provided prn. Pain: tylenol, braxton PRN   DVT PPx: eliquis     Team conference was held today on the patient and discussed directly with the patient utilizing their entire treatment team. Please see separate team note for details. Total treatment time for today's care >35 min. Rodolfo Asher MD 6/16/2020, 10:08 AM    * This document was created using dictation software. While all precautions were taken to ensure accuracy, errors may have occurred. Please disregard any typographical errors.

## 2020-06-16 NOTE — PROGRESS NOTES
Occupational Therapy  Facility/Department: [facility] ACUTE REHAB UNIT  Daily Treatment Note  NAME: Anatoliy Bose  : 1933  MRN: [id number]    Date of Service: 2020    Discharge Recommendations:  Home with Home health OT, S Level 3, Home with assist PRN  OT Equipment Recommendations  Equipment Needed: Yes  Other: has raised toilet seat, hip kit, will likely require w/c for mobility     Assessment   Performance deficits / Impairments: Decreased functional mobility ; Decreased balance;Decreased ADL status; Decreased endurance;Decreased high-level IADLs  Assessment: Pt is not at her baseline level of occupational function, based on the above deficits associated with R lateral tibia plateau fx. Pt continues to require variable levels of assist for functional transfers however is demonstrating increased activity tolerance and slow carryover for transfer training. Pt requires extensive assist for LB ADLs at this time, set-up fopr UB ADLs. Pt would benefit from continued intensive skilled acute OT services to address these deficits. Treatment Diagnosis: Decreased ADL/IADL status, functional mobility, endurance and balance associated with R lateral tibia plateau fx  Prognosis: Good  Assistance / Modification: assist of 2 for transfers  OT Education: Plan of Care;OT Role;Transfer Training;ADL Adaptive Strategies  Patient Education: continue to reinforce for carryover  REQUIRES OT FOLLOW UP: Yes  Activity Tolerance  Activity Tolerance: Patient Tolerated treatment well  Safety Devices  Safety Devices in place: Yes  Type of devices: Left in chair;Call light within reach; Chair alarm in place; All fall risk precautions in place         Patient Diagnosis(es): R Tib Fx     has a past medical history of Arthritis, Diabetes mellitus ([facility] [address] Hyperlipidemia, Hypertension, and Paroxysmal atrial fibrillation ([facility] [address]    has a past surgical history that includes Cholecystectomy (1990). Restrictions  Restrictions/Precautions  Restrictions/Precautions: Weight Bearing  Required Braces or Orthoses?: Yes  Lower Extremity Weight Bearing Restrictions  Right Lower Extremity Weight Bearing: Non Weight Bearing  Required Braces or Orthoses  Right Lower Extremity Brace: Knee Immobilizer  Right Upper Extremity Brace/Splint: Pt wears R UE splint for support w/WB; broke wrist 4/2019  Position Activity Restriction  Other position/activity restrictions: 59-year-old female with a history of diabetes, hyperlipidemia, hypertension, and A. fib on Eliquis who was admitted on 6/10 with right knee pain after falling down a few stairs. X-ray revealed a comminuted and mildly depressed fracture involving the lateral tibial plateau as well as a proximal fracture of the fibular head. Ortho evaluated and suggested conservative management with nonweightbearing in the right lower extremity. Therapy evaluated and suggested she continue in an inpatient setting prior to returning home. She reports her pain is controlled. She lives in a quad level home with multiple half staircases. Subjective   General  Chart Reviewed: Yes  Patient assessed for rehabilitation services?: Yes  Response to previous treatment: Patient with no complaints from previous session  Family / Caregiver Present: No  Referring Practitioner: Ankit Roche DO, for d/c planning  Diagnosis: L lateral tibia plateau fx  Subjective  Subjective: Pt seated EOB w/ PT at entry, agreeable to OT/PT session. General Comment  Comments: Collette Ruts   Vital Signs  Patient Currently in Pain: Denies          Objective    ADL  Grooming: Setup(hand washing, oral care, and face washing seated at sink)  LE Dressing: Dependent/Total(2 person assist: 1 person assist to maintain stance + assist for clothing management)  Toileting: Dependent/Total(2 person for clothing management, pt able to complete adelia hygiene post voiding urine w/ set-up)  Additional Comments: Pt assisted 25  Total Treatment Minutes:  39    Second Session Therapy Time:   Individual Concurrent Group Co-treatment   Time In    3036   Time Out    1500   Minutes    45     Timed Code Treatment Minutes:  20 + 25 + 45    Total Treatment Minutes:  90 min total    Charlette Dobbins, [address] [place] [address], OTR/L #[id number]

## 2020-06-16 NOTE — CARE COORDINATION
Team conference/Weekly Summary     Team conference held today. Met with pt to discuss progress with therapy, barriers to discharge, and plans to return home. Estimated discharge date set for 6/25. Will continue to follow for support and discharge planning.  Imer Bustillo MSW, LSW

## 2020-06-16 NOTE — PROGRESS NOTES
Physical Therapy  Facility/Department: [facility] ACUTE REHAB UNIT  Daily Treatment Note  NAME: Nisha Oneal  : 1933  MRN: [id number]    Date of Service: 2020    Discharge Recommendations:  Home with Home health PT, 24 hour supervision or assist, S Level 3   PT Equipment Recommendations  Equipment Needed: Yes  Mobility Devices: Wheelchair  Wheelchair: Light Weight  Other: The pt will need a w/c as she is NWB s/p tibial plateau fracture. She has a transport chair which she is unable to self propel    Assessment   Body structures, Functions, Activity limitations: Decreased functional mobility ; Decreased ROM; Decreased strength;Decreased balance;Decreased endurance; Increased pain;Decreased ADL status; Decreased safe awareness  Assessment: Pt transfers significantly range based on set up and fatigue but overall is progressing in all goal areas. Pt continues to require assist of 2 for safe completion of surface to surface transfers and remains non ambulatory. Static standing balance is improving with fixed grab bar surfaces. Pt will benefit from continued skilled therapy services to improve functional mobility level. Treatment Diagnosis: Impaired balance, impaired functional mobility  Prognosis: Good  PT Education: Goals;Transfer Training;PT Role;Plan of Care; Functional Mobility Training;Gait Training;Weight-bearing Education  Patient Education: pt verbalized understanding   Barriers to Learning: hearing  REQUIRES PT FOLLOW UP: Yes  Activity Tolerance  Activity Tolerance: Patient Tolerated treatment well     Patient Diagnosis(es): (R) tibial plateau fx     has a past medical history of Arthritis, Diabetes mellitus ([facility] [address] Hyperlipidemia, Hypertension, and Paroxysmal atrial fibrillation ([facility] [address] has a past surgical history that includes Cholecystectomy ().     Restrictions  Restrictions/Precautions  Restrictions/Precautions: Weight Bearing  Required Braces or Orthoses?: Yes  Lower Extremity Weight Bearing Restrictions  Right Lower Extremity Weight Bearing: Non Weight Bearing  Required Braces or Orthoses  Right Lower Extremity Brace: Knee Immobilizer  Right Upper Extremity Brace/Splint: Pt wears R UE splint for support w/WB; broke wrist 4/2019  Position Activity Restriction  Other position/activity restrictions: 77-year-old female with a history of diabetes, hyperlipidemia, hypertension, and A. fib on Eliquis who was admitted on 6/10 with right knee pain after falling down a few stairs. X-ray revealed a comminuted and mildly depressed fracture involving the lateral tibial plateau as well as a proximal fracture of the fibular head. Ortho evaluated and suggested conservative management with nonweightbearing in the right lower extremity. Therapy evaluated and suggested she continue in an inpatient setting prior to returning home. She reports her pain is controlled. She lives in a quad level home with multiple half staircases. Subjective   General  Chart Reviewed: Yes  Family / Caregiver Present: No  Referring Practitioner: Dr. Brittany Velasquez  Subjective  Subjective: Patient denies pain at rest.  Reporting moderate (R) calf/heel pain to touch. General Comment  Comments: Patient supine in bed upon arrival.  Doppler (-) to (R) LE. Agreeable to therapy session. Orientation  Orientation  Overall Orientation Status: Within Normal Limits    Cognition   Cognition  Overall Cognitive Status: WFL    Objective   Bed mobility  Rolling to Left: Contact guard assistance  Supine to Sit: Contact guard assistance  Scooting: Contact guard assistance  Comment: HOB elevated ~ 30* with use of bed rails. VC for sequence. Increased time for completion.    Transfers  Sit to Stand: Maximum Assistance(ballet bar x 2 completions, bathroom grab bar x 1 completion)  Stand to sit: Maximum Assistance  Squat Pivot Transfers: 2 Person Assistance(bed => w/c at max (A) of 1 + CGA, w/c => recliner at max (A) of 2)  Comment: toilet transfer x stand with Mod I  Long term goal 3: Bed to/from w/c with Supervision  Long term goal 4: Amb. x 25' with FWW and Min A  Long term goal 5: Propel w/c x 150' with Mod I  Long term goal 6: Car transfer with [address] term goal 7: Up and down 4 steps backward with Min A   Patient Goals   Patient goals : Improve mobility    Plan    Plan  Times per week: 90 minutes/day, 5 days/wk  Current Treatment Recommendations: ROM, Balance Training, Functional Mobility Training, Transfer Training, Neuromuscular Re-education, Endurance Training, Safety Education & Training, Gait Training, Equipment Evaluation, Education, & procurement, Patient/Caregiver Education & Training, Stair training, Home Exercise Program, ADL/Self-care Training, Strengthening, Pain Management, Modalities  Safety Devices  Type of devices:  All fall risk precautions in place, Call light within reach, Left in chair, Chair alarm in place, Gait belt  Restraints  Initially in place: No     Therapy Time   Individual Concurrent Group Co-treatment   Time In 0830     0850   Time Out 0850     0915   Minutes 20     25   Timed Code Treatment Minutes: [place] Therapy Time:   Individual Concurrent Group Co-treatment   Time In      1415   Time Out      1500   Minutes      45     Timed Code Treatment Minutes:  45 + 45     Total Treatment Minutes:  90 minutes    Emelina Ruff PT, DPT - [id number]

## 2020-06-16 NOTE — PLAN OF CARE
Problem: Falls - Risk of:  Goal: Will remain free from falls  Description: Will remain free from falls  6/16/2020 1603 by Mulu Sarkar RN  Outcome: Ongoing     Problem: Pain:  Description: Pain management should include both nonpharmacologic and pharmacologic interventions.   Goal: Pain level will decrease  Description: Pain level will decrease  Outcome: Ongoing     Problem: IP BLADDER/VOIDING  Goal: LTG - patient will achieve acceptable level of continence  6/16/2020 1603 by Mulu Sarkar RN  Outcome: Ongoing     Problem: IP BOWEL ELIMINATION  Goal: LTG - patient will have regular and routine bowel evacuation  Outcome: Ongoing     Problem: SKIN INTEGRITY  Goal: STG - patient will maintain good skin integrity  6/16/2020 1603 by Mulu Sarkar RN  Outcome: Ongoing

## 2020-06-17 LAB
GLUCOSE BLD-MCNC: 103 MG/DL (ref 70–99)
GLUCOSE BLD-MCNC: 127 MG/DL (ref 70–99)
GLUCOSE BLD-MCNC: 221 MG/DL (ref 70–99)
GLUCOSE BLD-MCNC: 249 MG/DL (ref 70–99)
PERFORMED ON: ABNORMAL

## 2020-06-17 PROCEDURE — 1280000000 HC REHAB R&B

## 2020-06-17 PROCEDURE — 97530 THERAPEUTIC ACTIVITIES: CPT

## 2020-06-17 PROCEDURE — 97535 SELF CARE MNGMENT TRAINING: CPT

## 2020-06-17 PROCEDURE — 6370000000 HC RX 637 (ALT 250 FOR IP): Performed by: PHYSICAL MEDICINE & REHABILITATION

## 2020-06-17 RX ADMIN — DOCUSATE SODIUM 100 MG: 100 CAPSULE ORAL at 09:59

## 2020-06-17 RX ADMIN — LATANOPROST 1 DROP: 50 SOLUTION OPHTHALMIC at 20:16

## 2020-06-17 RX ADMIN — APIXABAN 2.5 MG: 2.5 TABLET, FILM COATED ORAL at 09:59

## 2020-06-17 RX ADMIN — DESMOPRESSIN ACETATE 40 MG: 0.2 TABLET ORAL at 09:59

## 2020-06-17 RX ADMIN — LINAGLIPTIN 5 MG: 5 TABLET, FILM COATED ORAL at 10:03

## 2020-06-17 RX ADMIN — OXYCODONE 10 MG: 5 TABLET ORAL at 10:03

## 2020-06-17 RX ADMIN — AMLODIPINE BESYLATE 5 MG: 5 TABLET ORAL at 09:59

## 2020-06-17 RX ADMIN — OXYCODONE 10 MG: 5 TABLET ORAL at 15:58

## 2020-06-17 RX ADMIN — OXYCODONE 10 MG: 5 TABLET ORAL at 20:16

## 2020-06-17 RX ADMIN — INSULIN LISPRO 2 UNITS: 100 INJECTION, SOLUTION INTRAVENOUS; SUBCUTANEOUS at 12:32

## 2020-06-17 RX ADMIN — APIXABAN 2.5 MG: 2.5 TABLET, FILM COATED ORAL at 20:11

## 2020-06-17 RX ADMIN — INSULIN LISPRO 1 UNITS: 100 INJECTION, SOLUTION INTRAVENOUS; SUBCUTANEOUS at 20:12

## 2020-06-17 RX ADMIN — GLIPIZIDE 5 MG: 5 TABLET ORAL at 06:10

## 2020-06-17 RX ADMIN — SENNOSIDES 17.2 MG: 8.6 TABLET, FILM COATED ORAL at 20:11

## 2020-06-17 RX ADMIN — SENNOSIDES 17.2 MG: 8.6 TABLET, FILM COATED ORAL at 09:59

## 2020-06-17 ASSESSMENT — PAIN DESCRIPTION - LOCATION
LOCATION: KNEE

## 2020-06-17 ASSESSMENT — PAIN DESCRIPTION - PAIN TYPE
TYPE: ACUTE PAIN

## 2020-06-17 ASSESSMENT — PAIN SCALES - GENERAL
PAINLEVEL_OUTOF10: 0
PAINLEVEL_OUTOF10: 0
PAINLEVEL_OUTOF10: 7
PAINLEVEL_OUTOF10: 0
PAINLEVEL_OUTOF10: 7
PAINLEVEL_OUTOF10: 0
PAINLEVEL_OUTOF10: 7

## 2020-06-17 ASSESSMENT — PAIN DESCRIPTION - ORIENTATION
ORIENTATION: RIGHT

## 2020-06-17 ASSESSMENT — PAIN DESCRIPTION - FREQUENCY
FREQUENCY: INTERMITTENT
FREQUENCY: CONTINUOUS
FREQUENCY: CONTINUOUS

## 2020-06-17 ASSESSMENT — PAIN DESCRIPTION - DESCRIPTORS
DESCRIPTORS: ACHING

## 2020-06-17 ASSESSMENT — PAIN DESCRIPTION - ONSET
ONSET: ON-GOING

## 2020-06-17 ASSESSMENT — PAIN DESCRIPTION - PROGRESSION: CLINICAL_PROGRESSION: GRADUALLY IMPROVING

## 2020-06-17 NOTE — PLAN OF CARE
Problem: Falls - Risk of:  Goal: Will remain free from falls  Description: Will remain free from falls  Outcome: Ongoing  Note: Fall risk precautions in place, call light in reach, bed in lowest position, 2/2 bed rails up, bed wheels locked, bed side table within reach, bed alarm on, will continue to monitor. Problem: Falls - Risk of:  Goal: Absence of physical injury  Description: Absence of physical injury  Outcome: Ongoing  Note: Pt is free of injury. No injury noted. Fall precautions in place. Call light within reach. Will monitor. Problem: Pain:  Goal: Pain level will decrease  Description: Pain level will decrease  Outcome: Ongoing  Note: Complaints of pain this morning to the right knee. Medicated according to numerical pain scale. Patient satisfied with intervention. Will continue to assess and monitor. Problem: IP BLADDER/VOIDING  Goal: LTG - patient will achieve acceptable level of continence  Outcome: Ongoing  Note: Pt has been continent of urine this shift.

## 2020-06-17 NOTE — PROGRESS NOTES
Dejah Lomas  6/17/2020  [id number]    Chief Complaint: Closed fracture of right tibial plateau    Subjective:   No overnight events. No current complaints. Leg pain overall controlled. ROS: No CP, SOB, dyspnea    Objective:  Patient Vitals for the past 24 hrs:   BP Temp Temp src Pulse Resp SpO2 Weight   06/17/20 0611 -- -- -- -- -- -- 149 lb 14.4 oz (68 kg)   06/16/20 2100 (!) 159/66 98.1 °F (36.7 °C) Oral 67 18 97 % --     Gen: No distress, pleasant. Resting in bed  HEENT: Normocephalic, atraumatic. CV: Regular rate and rhythm. No MRG   Resp: No respiratory distress. CTAB   Abd: Soft, nontender nondistended  Ext: Mild RLE edema, calf pain remains, RLE in immobilizer  Neuro: Alert, oriented, appropriately interactive. Laboratory data: Available via EMR. Therapy progress:  PT  Required Braces or Orthoses  Right Lower Extremity Brace: Knee Immobilizer  Right Upper Extremity Brace/Splint: Pt wears R UE splint for support w/WB; broke wrist 4/2019  Position Activity Restriction  Other position/activity restrictions: 80-year-old female with a history of diabetes, hyperlipidemia, hypertension, and A. fib on [facility] who was admitted on 6/10 with right knee pain after falling down a few stairs. X-ray revealed a comminuted and mildly depressed fracture involving the lateral tibial plateau as well as a proximal fracture of the fibular head. Ortho evaluated and suggested conservative management with nonweightbearing in the right lower extremity. Therapy evaluated and suggested she continue in an inpatient setting prior to returning home. She reports her pain is controlled. She lives in a quad level home with multiple half staircases.   Objective     Sit to Stand: Maximum Assistance(ballet bar x 2 completions, bathroom grab bar x 1 completion)  Stand to sit: Maximum Assistance  Bed to Chair: 2 Person Assistance(Max A x 2 )     OT  PT Equipment Recommendations  Equipment Needed: Yes  Mobility Devices: Wheelchair  Wheelchair: [employer]  Other: The pt will need a w/c as she is NWB s/p tibial plateau fracture. She has a transport chair which she is unable to self propel  Toilet - Technique: Stand pivot  Equipment Used: Standard toilet  Toilet Transfers Comments: Mod-MaxA of 1 + CGA-Jennifer of 1, use of grab bar, cues for pivoting L LE  Assessment        SLP                Body mass index is 26.55 kg/m². Assessment:  Patient Active Problem List   Diagnosis    Paroxysmal atrial fibrillation (HCC)    Closed fracture of lateral portion of right tibial plateau    Chronic anticoagulation    Closed fracture of upper end of right fibula    Fall on stairs    Closed fracture of right tibial plateau       Plan:   Right tibial plateau fracture: NWB in immobilizer. Pain control. PT/OT     Proximal right fibular fracture: as above     Afib: eliquis     HTN: norvasc 5     HLD: lipitor 40     DM: glipizide 5, tradjenta 5, SSI    Right calf pain: negative DVT US     Bowels: Per protocol  Bladder: Per protocol  Sleep: Trazodone provided prn. Pain: tylenol, braxton PRN   DVT PPx: ara Melgoza MD 6/17/2020, 8:37 AM    * This document was created using dictation software. While all precautions were taken to ensure accuracy, errors may have occurred. Please disregard any typographical errors.

## 2020-06-17 NOTE — PROGRESS NOTES
Nutrition Assessment (Low Risk)    Type and Reason for Visit: Reassess    Nutrition Recommendations:   D/c Glucerna per pt request     Nutrition Assessment:  Pt's nutrition status remains stable AEB po intake greater than 50% of meals consistently. Pt reports apeptite has slowly returned; denies need for ONS @ this time, & would like d/c'd. Current po intake is adequately promoting healing. No further nutrition concerns expressed @ this time.      Malnutrition Assessment:  · Malnutrition Status: No malnutrition    Nutrition Risk Level   Risk Level: Low    Nutrition Diagnosis:   · Problem: No nutrition diagnosis at this time    Nutrition Intervention:  Food and/or Delivery: Continue current diet, Discontinue ONS  Nutrition Education/Counseling/Coordination of Care:  Continued Inpatient Monitoring, Education Not Indicated      Electronically signed by Leslie Lafleur RD, LD on 6/17/20 at 11:21 AM EDT    Contact Number: [phone]

## 2020-06-17 NOTE — PROGRESS NOTES
Weight Bearing  Required Braces or Orthoses  Right Lower Extremity Brace: Knee Immobilizer  Right Upper Extremity Brace/Splint: Pt wears R UE splint for support w/WB; broke wrist 4/2019  Position Activity Restriction  Other position/activity restrictions: 80-year-old female with a history of diabetes, hyperlipidemia, hypertension, and A. fib on Eliquis who was admitted on 6/10 with right knee pain after falling down a few stairs. X-ray revealed a comminuted and mildly depressed fracture involving the lateral tibial plateau as well as a proximal fracture of the fibular head. Ortho evaluated and suggested conservative management with nonweightbearing in the right lower extremity. Therapy evaluated and suggested she continue in an inpatient setting prior to returning home. She reports her pain is controlled. She lives in a quad level home with multiple half staircases. Subjective   General  Chart Reviewed: Yes  Family / Caregiver Present: No  Referring Practitioner: Dr. Fredy Ulloa  Subjective  Subjective: Patient denies pain. General Comment  Comments: Patient supine in bed upon arrival.  Agreeable to therapy session. Pain Screening  Patient Currently in Pain: Denies  Vital Signs  Patient Currently in Pain: Denies       Orientation  Orientation  Overall Orientation Status: Within Normal Limits    Cognition   Cognition  Overall Cognitive Status: WFL    Objective   Bed mobility  Supine to Sit: Stand by assistance  Scooting: Stand by assistance  Comment: HOB elevated ~ 15* with use of bed rails. Improved ability to reposition (R) LE  Transfers  Sit to Stand: Moderate Assistance  Stand to sit: Moderate Assistance  Stand Pivot Transfers: 2 Person Assistance(bed => w/c at mod (A) of 1, w/c => recliner at max (A) of 1 + mod (A) of 1)  Comment: toilet transfer with use of grab bar: mod (A) of 1 + CGA of 1. Dependent LB clothing management. Pt self maintains restricted WB status during all transfers with VC. Ambulation  Ambulation?: No     Balance  Posture: Fair  Sitting - Static: Good  Sitting - Dynamic: Fair;+  Standing - Static: Poor;+(CGA/min (A) with (B) UE support)  Standing - Dynamic: Poor;-      Comment: 1st session:  See above functional mobility. Shower completed with OT team member to maximize patient performance and maintain patient safety. Pt requires mod (A) of 1 + CGA (A) of 1 to complete SPT transfer from w/c <=> shower seat surface. Pt requires min (A) for static standing balance during OT assisted LB clothing management. Pt supervision assist to maintain dynamic sitting balance during shower completion in addition to OT assist with ADL task completion. 2nd session:  Pt seated in recliner upon arrival, denies pain at rest with reports of mild (B) hip pain during transfers. Patient agreeable to PT session, requesting to use restroom to begin session. Toilet transfer completed to/from w/c at mod (A) of 1 + CGA. Min (A) to maintain static standing during clothing management. Sit <=> stand transfers completed in // bars x 5 completions. Dynamic standing activities completed in // bars including reaching outside GURVINDER, simulated dressing tasks, pivot transfer training, forward/backward attempted hopping. Stand pivot transfer w/c => recliner completed at mod (A) of 2. Pt remains up in recliner with chair alarm and call light within reach. No new complaints following session.      Goals  Short term goals  Time Frame for Short term goals: 1 week  Short term goal 1: Supine to/from sit with Min A  Short term goal 2: Sit to/from stand with Mod A x 1 - goal met 6/17/2020  Short term goal 3: Propel w/c x 76' with Min A - goal met 6/15/2020  Short term goal 4: Amb x 1 lap in 11 bars with Mod A  Long term goals  Time Frame for Long term goals : 2 weeks  Long term goal 1: Supine to/from sit with Mod I  Long term goal 2: Sit to/from stand with Mod I  Long term goal 3: Bed to/from w/c with

## 2020-06-17 NOTE — PROGRESS NOTES
Occupational Therapy  Facility/Department: [facility] ACUTE REHAB UNIT  Daily Treatment Note  NAME: Candi Patino  : 1933  MRN: [id number]    Date of Service: 2020    Discharge Recommendations:  Home with Home health OT, S Level 3, Home with assist PRN  OT Equipment Recommendations  Equipment Needed: Yes  Mobility Devices: ADL Assistive Devices  ADL Assistive Devices: Transfer Tub Bench  Other: has raised toilet seat, hip kit, will likely require w/c for mobility     Assessment   Performance deficits / Impairments: Decreased functional mobility ; Decreased balance;Decreased ADL status; Decreased endurance;Decreased high-level IADLs  Assessment: Pt is not at her baseline level of occupational function, based on the above deficits associated with R lateral tibia plateau fx. Pt continues to require variable levels of assist for functional transfers however is demonstrating increased activity tolerance and slow carryover for transfer training. Pt requires extensive assist for LB ADLs at this time, set-up for UB ADLs. Pt would benefit from continued intensive skilled acute OT services to address these deficits. Treatment Diagnosis: Decreased ADL/IADL status, functional mobility, endurance and balance associated with R lateral tibia plateau fx  Prognosis: Good  OT Education: OT Role;Plan of Care;Transfer Training;ADL Adaptive Strategies; Equipment  Patient Education: continue to reinforce for carryover  REQUIRES OT FOLLOW UP: Yes  Activity Tolerance  Activity Tolerance: Patient Tolerated treatment well  Safety Devices  Safety Devices in place: Yes  Type of devices: Left in chair;Call light within reach; Chair alarm in place; All fall risk precautions in place         Patient Diagnosis(es): tibal fx     has a past medical history of Arthritis, Diabetes mellitus ([facility] [address] Hyperlipidemia, Hypertension, and Paroxysmal atrial fibrillation ([facility] [address]    has a past surgical history that includes Cholecystectomy Dressing: Maximum assistance(assist for threading brief and donning socks, Jennifer w/ use of reacher to thread pants. MaxA to don clothing voer hips in stance)  Toileting: Maximum assistance(assist for clothing management, pt able to complete adelia hygiene w/ lateral leans)  Additional Comments: pt assisted to/from bathroom via w/c for ADL completeion. UB/LB bathing/dressing completed seated on shower chair. increased time for ADL completion. Balance  Sitting Balance: Supervision  Standing Balance: Minimal assistance  Standing Balance  Time: ~8min total  Activity: functional transfers, ADL completion  Functional Mobility  Functional Mobility Comments: pt assisted via w/c for am session secondary to time  Toilet Transfers  Toilet - Technique: Stand pivot  Equipment Used: Standard toilet  Toilet Transfers Comments: ModA + CGA   Shower Transfers  Shower - Transfer From: Wheelchair  Shower - Transfer Type: To and From  Shower - Transfer To: Shower seat with back  Shower - Technique: Stand pivot  Shower Transfers Comments: ModA of 1 + CGA of 1  Bed mobility  Supine to Sit: Stand by assistance  Scooting: Stand by assistance  Transfers  Sit to stand: 2 Person assistance  Stand to sit: 2 Person assistance  Transfer Comments: variable levels of assist: ModA of 1 for SPT EOB>w/c and w/c<>shower. Pt required MaxA + ModA for w/c>recliner transfer. Cognition  Overall Cognitive Status: WFL              Second Session:  Pt seated in recliner at entry, agreeable to OT/PT session. Pt denied pain and requesting to use commode. SPTs w/ ModA (plus CGA-SBA of 2nd for safety), cues for progressing LE and for effective hand placement:  w/c<> commode, and w/c<>parallel bars (x5), at end of session pt requiring ModA of 2 for w/c>recliner transfer. Pt completed toileting w/ Max for clothing management and set-up for adelia hygiene. w/c mobility w/ B UE ~160 ft w/ SUP and occasional VC for technique.  Pt completed 5 stances in parallel bars for dynamic standing activities: reaching outside [place] to grasp cones held across multiple planes, simulated dressing tasks w/ use of clothespins pinned on clothing, pivots to R and L for transfer training, and attempts of forward/backward hops; pt required Jennifer to CGA for standing balance during activity. Pt left seated in recliner at end of session, chair alarm on, call light and needs within reach.             Plan   Plan  Times per week: 90 minutes 5 days per week   Times per day: Daily  Specific instructions for Next Treatment: cotx to maximize safety and functional mobility  Current Treatment Recommendations: Functional Mobility Training, Safety Education & Training, Balance Training, Self-Care / ADL, Endurance Training, Wheelchair Mobility Training, Patient/Caregiver Education & Training          Goals  Short term goals  Time Frame for Short term goals: 3 weeks   Short term goal 1: Mod I functional transfers to toilet and shower   Short term goal 2: mod I UB bathing/dressing  Short term goal 3: min A LB bathing/dressing   Short term goal 4: min A toileting   Short term goal 5: mod I functional mobility from w/c level for ADLs/IADLs  Patient Goals   Patient goals : \"to return home\"        Therapy Time   Individual Concurrent Group Co-treatment   Time In       0830, 7532   Time Out       0915, 1330   Minutes       45, 45        Timed Code Treatment Minutes:  45 + 45  Total Treatment Minutes:  90 min total      Musa Duty, [address] [facility], OTR/L #[id number]

## 2020-06-18 LAB
ANION GAP SERPL CALCULATED.3IONS-SCNC: 10 MMOL/L (ref 3–16)
BUN BLDV-MCNC: 18 MG/DL (ref 7–20)
CALCIUM SERPL-MCNC: 8.8 MG/DL (ref 8.3–10.6)
CHLORIDE BLD-SCNC: 104 MMOL/L (ref 99–110)
CO2: 22 MMOL/L (ref 21–32)
CREAT SERPL-MCNC: 1 MG/DL (ref 0.6–1.2)
GFR AFRICAN AMERICAN: >60
GFR NON-AFRICAN AMERICAN: 52
GLUCOSE BLD-MCNC: 120 MG/DL (ref 70–99)
GLUCOSE BLD-MCNC: 126 MG/DL (ref 70–99)
GLUCOSE BLD-MCNC: 163 MG/DL (ref 70–99)
GLUCOSE BLD-MCNC: 181 MG/DL (ref 70–99)
GLUCOSE BLD-MCNC: 272 MG/DL (ref 70–99)
HCT VFR BLD CALC: 28.1 % (ref 36–48)
HEMOGLOBIN: 8.8 G/DL (ref 12–16)
MCH RBC QN AUTO: 24.6 PG (ref 26–34)
MCHC RBC AUTO-ENTMCNC: 31.3 G/DL (ref 31–36)
MCV RBC AUTO: 78.4 FL (ref 80–100)
PDW BLD-RTO: 17.4 % (ref 12.4–15.4)
PERFORMED ON: ABNORMAL
PLATELET # BLD: 366 K/UL (ref 135–450)
PMV BLD AUTO: 8.3 FL (ref 5–10.5)
POTASSIUM SERPL-SCNC: 4.8 MMOL/L (ref 3.5–5.1)
RBC # BLD: 3.58 M/UL (ref 4–5.2)
SODIUM BLD-SCNC: 136 MMOL/L (ref 136–145)
WBC # BLD: 6.9 K/UL (ref 4–11)

## 2020-06-18 PROCEDURE — 1280000000 HC REHAB R&B

## 2020-06-18 PROCEDURE — 97530 THERAPEUTIC ACTIVITIES: CPT

## 2020-06-18 PROCEDURE — 36415 COLL VENOUS BLD VENIPUNCTURE: CPT

## 2020-06-18 PROCEDURE — 97110 THERAPEUTIC EXERCISES: CPT

## 2020-06-18 PROCEDURE — 97535 SELF CARE MNGMENT TRAINING: CPT

## 2020-06-18 PROCEDURE — 80048 BASIC METABOLIC PNL TOTAL CA: CPT

## 2020-06-18 PROCEDURE — 94760 N-INVAS EAR/PLS OXIMETRY 1: CPT

## 2020-06-18 PROCEDURE — 6370000000 HC RX 637 (ALT 250 FOR IP): Performed by: PHYSICAL MEDICINE & REHABILITATION

## 2020-06-18 PROCEDURE — 85027 COMPLETE CBC AUTOMATED: CPT

## 2020-06-18 RX ADMIN — OXYCODONE 10 MG: 5 TABLET ORAL at 07:48

## 2020-06-18 RX ADMIN — APIXABAN 2.5 MG: 2.5 TABLET, FILM COATED ORAL at 07:48

## 2020-06-18 RX ADMIN — OXYCODONE 10 MG: 5 TABLET ORAL at 21:18

## 2020-06-18 RX ADMIN — LATANOPROST 1 DROP: 50 SOLUTION OPHTHALMIC at 21:18

## 2020-06-18 RX ADMIN — INSULIN LISPRO 1 UNITS: 100 INJECTION, SOLUTION INTRAVENOUS; SUBCUTANEOUS at 18:10

## 2020-06-18 RX ADMIN — INSULIN LISPRO 1 UNITS: 100 INJECTION, SOLUTION INTRAVENOUS; SUBCUTANEOUS at 12:45

## 2020-06-18 RX ADMIN — DOCUSATE SODIUM 100 MG: 100 CAPSULE ORAL at 07:48

## 2020-06-18 RX ADMIN — INSULIN LISPRO 2 UNITS: 100 INJECTION, SOLUTION INTRAVENOUS; SUBCUTANEOUS at 21:20

## 2020-06-18 RX ADMIN — GLIPIZIDE 5 MG: 5 TABLET ORAL at 06:35

## 2020-06-18 RX ADMIN — DESMOPRESSIN ACETATE 40 MG: 0.2 TABLET ORAL at 07:48

## 2020-06-18 RX ADMIN — SENNOSIDES 17.2 MG: 8.6 TABLET, FILM COATED ORAL at 21:18

## 2020-06-18 RX ADMIN — MAGNESIUM HYDROXIDE 30 ML: 400 SUSPENSION ORAL at 18:09

## 2020-06-18 RX ADMIN — AMLODIPINE BESYLATE 5 MG: 5 TABLET ORAL at 07:48

## 2020-06-18 RX ADMIN — APIXABAN 2.5 MG: 2.5 TABLET, FILM COATED ORAL at 21:18

## 2020-06-18 RX ADMIN — SENNOSIDES 17.2 MG: 8.6 TABLET, FILM COATED ORAL at 07:48

## 2020-06-18 RX ADMIN — LINAGLIPTIN 5 MG: 5 TABLET, FILM COATED ORAL at 11:59

## 2020-06-18 ASSESSMENT — PAIN DESCRIPTION - FREQUENCY
FREQUENCY: INTERMITTENT
FREQUENCY: INTERMITTENT

## 2020-06-18 ASSESSMENT — PAIN DESCRIPTION - PROGRESSION
CLINICAL_PROGRESSION: GRADUALLY IMPROVING
CLINICAL_PROGRESSION: GRADUALLY WORSENING

## 2020-06-18 ASSESSMENT — PAIN DESCRIPTION - ONSET
ONSET: ON-GOING
ONSET: ON-GOING

## 2020-06-18 ASSESSMENT — PAIN DESCRIPTION - LOCATION
LOCATION: KNEE
LOCATION: KNEE

## 2020-06-18 ASSESSMENT — PAIN DESCRIPTION - DIRECTION: RADIATING_TOWARDS: CALF

## 2020-06-18 ASSESSMENT — PAIN DESCRIPTION - DESCRIPTORS
DESCRIPTORS: ACHING
DESCRIPTORS: ACHING

## 2020-06-18 ASSESSMENT — PAIN SCALES - GENERAL
PAINLEVEL_OUTOF10: 7
PAINLEVEL_OUTOF10: 0
PAINLEVEL_OUTOF10: 7

## 2020-06-18 ASSESSMENT — PAIN DESCRIPTION - PAIN TYPE
TYPE: ACUTE PAIN
TYPE: ACUTE PAIN

## 2020-06-18 ASSESSMENT — PAIN DESCRIPTION - ORIENTATION
ORIENTATION: RIGHT
ORIENTATION: RIGHT

## 2020-06-18 ASSESSMENT — PAIN - FUNCTIONAL ASSESSMENT: PAIN_FUNCTIONAL_ASSESSMENT: PREVENTS OR INTERFERES SOME ACTIVE ACTIVITIES AND ADLS

## 2020-06-18 NOTE — PROGRESS NOTES
Physical Therapy  Facility/Department: [facility] ACUTE REHAB UNIT  Daily Treatment Note  NAME: Lui Jimenez  : 1933  MRN: [id number]    Date of Service: 2020    Discharge Recommendations:  Home with Home health PT, 24 hour supervision or assist, S Level 3   PT Equipment Recommendations  Equipment Needed: Yes  Mobility Devices: Wheelchair  Wheelchair: Light Weight  Other: The pt will need a w/c as she is NWB s/p tibial plateau fracture. She has a transport chair which she is unable to self propel    Assessment   Body structures, Functions, Activity limitations: Decreased functional mobility ; Decreased ROM; Decreased strength;Decreased balance;Decreased endurance; Increased pain;Decreased ADL status; Decreased safe awareness  Assessment: Pt continues to make consistent progress in response to therapy sessions including continued progression with ability to complete transfers. Plan for transition to individual therapy sessions at this time in response to patient progress. Pt will benefit from continued skilled therapy services to improve functional mobility level. Treatment Diagnosis: Impaired balance, impaired functional mobility  Prognosis: Good  PT Education: Goals;Transfer Training;PT Role;Plan of Care; Functional Mobility Training;Gait Training;General Safety  Patient Education: pt verbalized understanding   Activity Tolerance  Activity Tolerance: Patient Tolerated treatment well  Activity Tolerance: NWB status limiting functional mobility completion. Patient Diagnosis(es): (R) tibial plateau fx     has a past medical history of Arthritis, Diabetes mellitus ([address] Hyperlipidemia, Hypertension, and Paroxysmal atrial fibrillation ([facility] [address] has a past surgical history that includes Cholecystectomy ().     Restrictions  Restrictions/Precautions  Restrictions/Precautions: Weight Bearing  Required Braces or Orthoses?: Yes  Lower Extremity Weight Bearing Restrictions  Right Lower Extremity Weight Bearing: Non Weight Bearing  Required Braces or Orthoses  Right Lower Extremity Brace: Knee Immobilizer  Right Upper Extremity Brace/Splint: Pt wears R UE splint for support w/WB; broke wrist 4/2019  Position Activity Restriction  Other position/activity restrictions: 68-year-old female with a history of diabetes, hyperlipidemia, hypertension, and A. fib on Eliquis who was admitted on 6/10 with right knee pain after falling down a few stairs. X-ray revealed a comminuted and mildly depressed fracture involving the lateral tibial plateau as well as a proximal fracture of the fibular head. Ortho evaluated and suggested conservative management with nonweightbearing in the right lower extremity. Therapy evaluated and suggested she continue in an inpatient setting prior to returning home. She reports her pain is controlled. She lives in a quad level home with multiple half staircases. Subjective   General  Chart Reviewed: Yes  Family / Caregiver Present: No  Referring Practitioner: Dr. Philomena Barney  Subjective  Subjective: Patient denies pain. Agreeable to therapy session. General Comment  Comments: Patient supine in bed upon arrival.  Knee immobilizer readjusted upon arrival.       Cognition   Cognition  Overall Cognitive Status: WFL    Objective   Bed mobility  Supine to Sit: Supervision  Scooting: Supervision  Comment: HOB elevated with use of HR  Transfers  Sit to Stand: Moderate Assistance(mod (A) with VC for hand placement)  Stand to sit: Moderate Assistance  Stand Pivot Transfers: Moderate Assistance(mod (A) + CGA for bed => w/c => recliner within session without use of assistive device.)  Comment: toilet transfer x 2 completions with use of grab bar: mod (A) of 1 + CGA of 1. Assist provided by LB clothing management. Pt self maintains restricted WB status during all transfers with VC.     Ambulation  Ambulation?: No     Balance  Posture: Fair  Sitting - Static: Good  Sitting - Dynamic: Fair;+  Standing - Static: Poor;+  Standing - Dynamic: Poor;-     Comment: 1st session:  See above functional mobility. In addition transfer training completed w/c <=> elevated therapy mat with use of RW. Pt requires mod (A) to complete from w/c, requiring max (A) of 1 + mod (A) of 1 to complete from therapy mat without HR. Reattempted with shoe, unable to complete pivot component. 2nd session:  Pt seated in recliner upon arrival, reporting mild discomfort in (R) calf and wrist.  Does not rate pain, denies pain intervention, agreeable to PT session. Stand pivot transfer recliner <=> w/c completed at mod (A). Toilet transfer completed with grab bar at mod (A) + dependent assist for LB clothing management. Manual w/c propulsion with (B)  ft at SBA with VC for hand sequence for turning. Stand pivot transfer w/c <=> seated stepper at max (A). Seated stepper L1: 3 min + 3 min to improve strength and cardiovascular endurance without use of (R) LE. Pt completes seated LE therex with 1 x 10 ea: Hip Adduction with ball squeeze (B), Hip Abduction with yellow t-band (B), LAQ (L), marching(B), heel raises (B). Upon return to room, pt remains up in recliner with chair alarm and call light within reach. No new complaints following session.        Goals  Short term goals  Time Frame for Short term goals: 1 week  Short term goal 1: Supine to/from sit with Min A  Short term goal 2: Sit to/from stand with Mod A x 1 - goal met 6/17/2020  Short term goal 3: Propel w/c x 76' with Min A - goal met 6/15/2020  Short term goal 4: Amb x 1 lap in 11 bars with Mod A  Long term goals  Time Frame for Long term goals : 2 weeks  Long term goal 1: Supine to/from sit with Mod I  Long term goal 2: Sit to/from stand with Mod I  Long term goal 3: Bed to/from w/c with Supervision  Long term goal 4: Amb. x 25' with FWW and Min A  Long term goal 5: Propel w/c x 150' with Mod I  Long term goal 6: Car transfer with Zahra Huang term goal 7: Up and down 4 steps backward with Min A   Patient Goals   Patient goals : Improve mobility    Plan    Plan  Times per week: 90 minutes/day, 5 days/wk  Times per day: Daily  Current Treatment Recommendations: ROM, Balance Training, Functional Mobility Training, Transfer Training, Neuromuscular Re-education, Endurance Training, Safety Education & Training, Gait Training, Equipment Evaluation, Education, & procurement, Patient/Caregiver Education & Training, Stair training, Home Exercise Program, ADL/Self-care Training, Strengthening, Pain Management, Modalities  Safety Devices  Type of devices:  All fall risk precautions in place, Call light within reach, Left in chair, Chair alarm in place, Gait belt  Restraints  Initially in place: No     Therapy Time   Individual Concurrent Group Co-treatment   Time In       0830   Time Out       0915   Minutes       45   Timed Code Treatment Minutes: [address] Time:   Individual Concurrent Group Co-treatment   Time In  1245        Time Out  1330        Minutes  45          Timed Code Treatment Minutes:  45 + 45     Total Treatment Minutes:  90 minutes    Emelina Ruff PT, DPT - [id number]

## 2020-06-18 NOTE — PROGRESS NOTES
Dejah Lomas  6/18/2020  [id number]    Chief Complaint: Closed fracture of right tibial plateau    Subjective:   No overnight events. No current complaints. Leg pain controlled. BP and glucose overall appropriate. ROS: No CP, SOB, dyspnea    Objective:  Patient Vitals for the past 24 hrs:   BP Temp Temp src Pulse Resp SpO2 Weight   06/18/20 0746 (!) 157/63 97.8 °F (36.6 °C) Oral 64 20 -- --   06/18/20 0245 -- -- -- -- -- -- 150 lb 9.6 oz (68.3 kg)   06/17/20 1951 (!) 166/50 97.7 °F (36.5 °C) Oral 61 16 99 % --   06/17/20 0958 137/64 97.8 °F (36.6 °C) Oral 63 16 -- --     Gen: No distress, pleasant. Resting in bed  HEENT: Normocephalic, atraumatic  CV: Regular rate and rhythm. No MRG   Resp: No respiratory distress. CTAB   Abd: Soft, nontender nondistended  Ext: Mild RLE edema, calf pain remains, RLE in immobilizer  Neuro: Alert, oriented, appropriately interactive. Laboratory data: Available via EMR. Therapy progress:  PT  Required Braces or Orthoses  Right Lower Extremity Brace: Knee Immobilizer  Right Upper Extremity Brace/Splint: Pt wears R UE splint for support w/WB; broke wrist 4/2019  Position Activity Restriction  Other position/activity restrictions: 80-year-old female with a history of diabetes, hyperlipidemia, hypertension, and A. fib on [facility] who was admitted on 6/10 with right knee pain after falling down a few stairs. X-ray revealed a comminuted and mildly depressed fracture involving the lateral tibial plateau as well as a proximal fracture of the fibular head. Ortho evaluated and suggested conservative management with nonweightbearing in the right lower extremity. Therapy evaluated and suggested she continue in an inpatient setting prior to returning home. She reports her pain is controlled. She lives in a quad level home with multiple half staircases. Objective     Sit to Stand:  Moderate Assistance  Stand to sit: Moderate Assistance  Bed to Chair: 2 Person Assistance(Max A x 2 )     OT  PT Equipment Recommendations  Equipment Needed: Yes  Mobility Devices: Wheelchair  Wheelchair: Light Weight  Other: The pt will need a w/c as she is NWB s/p tibial plateau fracture. She has a transport chair which she is unable to self propel  Toilet - Technique: Stand pivot  Equipment Used: Standard toilet  Toilet Transfers Comments: ModA + CGA   Assessment        SLP                Body mass index is 26.68 kg/m². Assessment:  Patient Active Problem List   Diagnosis    Paroxysmal atrial fibrillation (HCC)    Closed fracture of lateral portion of right tibial plateau    Chronic anticoagulation    Closed fracture of upper end of right fibula    Fall on stairs    Closed fracture of right tibial plateau       Plan:   Right tibial plateau fracture: NWB in immobilizer. Pain control. PT/OT     Proximal right fibular fracture: as above     Afib: eliquis     HTN: norvasc 5     HLD: lipitor 40     DM: glipizide 5, tradjenta 5, SSI    Right calf pain: negative DVT US     Bowels: Per protocol  Bladder: Per protocol  Sleep: Trazodone provided prn. Pain: tylenol, braxton PRN   DVT PPx: ara Brown MD 6/18/2020, 8:50 AM    * This document was created using dictation software. While all precautions were taken to ensure accuracy, errors may have occurred. Please disregard any typographical errors.

## 2020-06-18 NOTE — PROGRESS NOTES
Occupational Therapy  Facility/Department: [facility] ACUTE REHAB UNIT  Daily Treatment Note  NAME: Celeste Rodriguez  : 1933  MRN: [id number]    Date of Service: 2020    Discharge Recommendations:  Home with Home health OT, S Level 3, Home with assist PRN  OT Equipment Recommendations  Equipment Needed: Yes  Mobility Devices: ADL Assistive Devices  ADL Assistive Devices: Transfer Tub Bench  Other: has raised toilet seat, hip kit, will likely require w/c for mobility     Assessment   Performance deficits / Impairments: Decreased functional mobility ; Decreased balance;Decreased ADL status; Decreased endurance;Decreased high-level IADLs  Assessment: Pt is not at her baseline level of occupational function, based on the above deficits associated with R lateral tibia plateau fx. Pt has progressed w/ functional transfers currently requiring ModA for SPTs, pt continued to require [facility] [place] for LB ADLs. Pt would benefit from continued intensive skilled acute OT services to address these deficits. Treatment Diagnosis: Decreased ADL/IADL status, functional mobility, endurance and balance associated with R lateral tibia plateau fx  Prognosis: Good  OT Education: OT Role;Plan of Care;Transfer Training;ADL Adaptive Strategies  Patient Education: continue to reinforce for carryover  REQUIRES OT FOLLOW UP: Yes  Activity Tolerance  Activity Tolerance: Patient Tolerated treatment well  Safety Devices  Safety Devices in place: Yes  Type of devices: Left in chair;Call light within reach; Chair alarm in place; All fall risk precautions in place         Patient Diagnosis(es): R tibia plateau fx     has a past medical history of Arthritis, Diabetes mellitus ([facility] [address] Hyperlipidemia, Hypertension, and Paroxysmal atrial fibrillation ([facility] [address] has a past surgical history that includes Cholecystectomy ().     Restrictions  Restrictions/Precautions  Restrictions/Precautions: Weight Bearing  Required Braces or Orthoses?: Yes  Lower Extremity Weight Bearing Restrictions  Right Lower Extremity Weight Bearing: Non Weight Bearing  Required Braces or Orthoses  Right Lower Extremity Brace: Knee Immobilizer  Right Upper Extremity Brace/Splint: Pt wears R UE splint for support w/WB; broke wrist 4/2019  Position Activity Restriction  Other position/activity restrictions: 70-year-old female with a history of diabetes, hyperlipidemia, hypertension, and A. fib on Eliquis who was admitted on 6/10 with right knee pain after falling down a few stairs. X-ray revealed a comminuted and mildly depressed fracture involving the lateral tibial plateau as well as a proximal fracture of the fibular head. Ortho evaluated and suggested conservative management with nonweightbearing in the right lower extremity. Therapy evaluated and suggested she continue in an inpatient setting prior to returning home. She reports her pain is controlled. She lives in a quad level home with multiple half staircases. Subjective   General  Chart Reviewed: Yes  Patient assessed for rehabilitation services?: Yes  Response to previous treatment: Patient with no complaints from previous session  Family / Caregiver Present: No  Referring Practitioner: Bull Phillips DO, for d/c planning  Diagnosis: R lateral tibia plateau fx  Subjective  Subjective: Pt supine in bed at entry, agreeable to OT/PT session  General Comment  Comments: Evelyn Mcmullen Vital Signs  Patient Currently in Pain: Denies          Objective    ADL  Equipment Provided: Reacher  Grooming: Supervision(oral care and hand washing w/c level at sink)  UE Dressing: Setup;Supervision(pt doffed t-shirt and donned bra and shirt seated upright in bed)  LE Dressing: Maximum assistance; Increased time to complete;Verbal cueing;Setup(pt able to thread LEs into undergarments w/ use of reacher, MaX to don clothing over hips in stance and TD for socks)  Toileting:  Moderate assistance(assist for clothing management, pt able to complete adelia punches, and shoulder flexion, rest breaks incorporated throughout. Pt requesting to use commode at end of session, SPT w/c<>commode w/ ModA (cues for hand placement), MaxA for clothing management. Pt required additional stance w/c>grab bars for clothing management secondary to pt reporting pain during transfer from commode. SPT w/c>recliner w/ ModA. Pt left seated in recliner at end of session, chair alarm on, call light and needs within reach.             Plan   Plan  Times per week: 90 minutes 5 days per week   Times per day: Daily  Specific instructions for Next Treatment: cotx to maximize safety and functional mobility  Current Treatment Recommendations: Functional Mobility Training, Safety Education & Training, Balance Training, Self-Care / ADL, Endurance Training, Wheelchair Mobility Training, Patient/Caregiver Education & Training       Goals  Short term goals  Time Frame for Short term goals: 3 weeks   Short term goal 1: Mod I functional transfers to toilet and shower   Short term goal 2: mod I UB bathing/dressing  Short term goal 3: min A LB bathing/dressing   Short term goal 4: min A toileting   Short term goal 5: mod I functional mobility from w/c level for ADLs/IADLs  Patient Goals   Patient goals : \"to return home\"        Therapy Time   Individual Concurrent Group Co-treatment   Time In       0830   Time Out       0915   Minutes       39          Second Session Therapy Time:   Individual Concurrent Group Co-treatment   Time In 5722      Time Out 1100      Minutes 45        Timed Code Treatment Minutes:  45 + 45    Total Treatment Minutes:  90 min total    Musa Duty, [address] [facility], OTR/L #[id number]

## 2020-06-19 LAB
GLUCOSE BLD-MCNC: 156 MG/DL (ref 70–99)
GLUCOSE BLD-MCNC: 210 MG/DL (ref 70–99)
GLUCOSE BLD-MCNC: 225 MG/DL (ref 70–99)
GLUCOSE BLD-MCNC: 88 MG/DL (ref 70–99)
PERFORMED ON: ABNORMAL
PERFORMED ON: NORMAL

## 2020-06-19 PROCEDURE — 97535 SELF CARE MNGMENT TRAINING: CPT

## 2020-06-19 PROCEDURE — 97530 THERAPEUTIC ACTIVITIES: CPT

## 2020-06-19 PROCEDURE — 1280000000 HC REHAB R&B

## 2020-06-19 PROCEDURE — 97110 THERAPEUTIC EXERCISES: CPT

## 2020-06-19 PROCEDURE — 94760 N-INVAS EAR/PLS OXIMETRY 1: CPT

## 2020-06-19 PROCEDURE — 6370000000 HC RX 637 (ALT 250 FOR IP): Performed by: PHYSICAL MEDICINE & REHABILITATION

## 2020-06-19 RX ADMIN — OXYCODONE 10 MG: 5 TABLET ORAL at 09:40

## 2020-06-19 RX ADMIN — LATANOPROST 1 DROP: 50 SOLUTION OPHTHALMIC at 21:56

## 2020-06-19 RX ADMIN — DOCUSATE SODIUM 100 MG: 100 CAPSULE ORAL at 09:40

## 2020-06-19 RX ADMIN — MAGNESIUM HYDROXIDE 30 ML: 400 SUSPENSION ORAL at 09:44

## 2020-06-19 RX ADMIN — APIXABAN 2.5 MG: 2.5 TABLET, FILM COATED ORAL at 21:48

## 2020-06-19 RX ADMIN — DESMOPRESSIN ACETATE 40 MG: 0.2 TABLET ORAL at 09:40

## 2020-06-19 RX ADMIN — OXYCODONE 10 MG: 5 TABLET ORAL at 02:46

## 2020-06-19 RX ADMIN — AMLODIPINE BESYLATE 5 MG: 5 TABLET ORAL at 09:40

## 2020-06-19 RX ADMIN — SENNOSIDES 17.2 MG: 8.6 TABLET, FILM COATED ORAL at 09:40

## 2020-06-19 RX ADMIN — OXYCODONE 10 MG: 5 TABLET ORAL at 14:51

## 2020-06-19 RX ADMIN — OXYCODONE 5 MG: 5 TABLET ORAL at 21:57

## 2020-06-19 RX ADMIN — INSULIN LISPRO 2 UNITS: 100 INJECTION, SOLUTION INTRAVENOUS; SUBCUTANEOUS at 12:49

## 2020-06-19 RX ADMIN — INSULIN LISPRO 1 UNITS: 100 INJECTION, SOLUTION INTRAVENOUS; SUBCUTANEOUS at 21:52

## 2020-06-19 RX ADMIN — SENNOSIDES 17.2 MG: 8.6 TABLET, FILM COATED ORAL at 21:49

## 2020-06-19 RX ADMIN — APIXABAN 2.5 MG: 2.5 TABLET, FILM COATED ORAL at 09:40

## 2020-06-19 RX ADMIN — BISACODYL 10 MG: 10 SUPPOSITORY RECTAL at 18:57

## 2020-06-19 RX ADMIN — LINAGLIPTIN 5 MG: 5 TABLET, FILM COATED ORAL at 12:49

## 2020-06-19 RX ADMIN — INSULIN LISPRO 1 UNITS: 100 INJECTION, SOLUTION INTRAVENOUS; SUBCUTANEOUS at 18:04

## 2020-06-19 RX ADMIN — GLIPIZIDE 5 MG: 5 TABLET ORAL at 06:19

## 2020-06-19 ASSESSMENT — PAIN DESCRIPTION - PAIN TYPE
TYPE: ACUTE PAIN

## 2020-06-19 ASSESSMENT — PAIN DESCRIPTION - ORIENTATION
ORIENTATION: RIGHT

## 2020-06-19 ASSESSMENT — PAIN DESCRIPTION - FREQUENCY
FREQUENCY: INTERMITTENT

## 2020-06-19 ASSESSMENT — PAIN DESCRIPTION - ONSET
ONSET: ON-GOING

## 2020-06-19 ASSESSMENT — PAIN - FUNCTIONAL ASSESSMENT: PAIN_FUNCTIONAL_ASSESSMENT: PREVENTS OR INTERFERES SOME ACTIVE ACTIVITIES AND ADLS

## 2020-06-19 ASSESSMENT — PAIN SCALES - GENERAL
PAINLEVEL_OUTOF10: 7
PAINLEVEL_OUTOF10: 1
PAINLEVEL_OUTOF10: 0
PAINLEVEL_OUTOF10: 4
PAINLEVEL_OUTOF10: 4
PAINLEVEL_OUTOF10: 7
PAINLEVEL_OUTOF10: 0
PAINLEVEL_OUTOF10: 7

## 2020-06-19 ASSESSMENT — PAIN DESCRIPTION - LOCATION
LOCATION: KNEE

## 2020-06-19 ASSESSMENT — PAIN DESCRIPTION - PROGRESSION
CLINICAL_PROGRESSION: NOT CHANGED
CLINICAL_PROGRESSION: GRADUALLY IMPROVING

## 2020-06-19 ASSESSMENT — PAIN DESCRIPTION - DESCRIPTORS
DESCRIPTORS: ACHING

## 2020-06-19 NOTE — PROGRESS NOTES
Physical Therapy  Facility/Department: [facility] ACUTE REHAB UNIT  Daily Treatment Note  NAME: Krzysztof Pulido  : 1933  MRN: [id number]    Date of Service: 2020    Discharge Recommendations:  Home with Home health PT, 24 hour supervision or assist, S Level 3   PT Equipment Recommendations  Equipment Needed: Yes  Mobility Devices: Wheelchair  Wheelchair: Light Weight  Other: The pt will need a w/c as she is NWB s/p tibial plateau fracture. She has a transport chair which she is unable to self propel    Assessment   Body structures, Functions, Activity limitations: Decreased functional mobility ; Decreased ROM; Decreased strength;Decreased balance;Decreased endurance; Increased pain;Decreased ADL status; Decreased safe awareness  Assessment: Pt continues to progress ability to complete surface to surface transfers. Car transfer training intiated on this date. Pt continues to require assist of 1 for all functional mobility at this time. Pt will benefit from continued skilled therapy services to improve functional mobility level. Treatment Diagnosis: Impaired balance, impaired functional mobility  Prognosis: Good  PT Education: Goals;Transfer Training;PT Role;Plan of Care; Functional Mobility Training;General Safety  Patient Education: car transfer training. pt verbalized understanding   Barriers to Learning: hearing  REQUIRES PT FOLLOW UP: Yes  Activity Tolerance  Activity Tolerance: Patient Tolerated treatment well     Patient Diagnosis(es): (R) tibial plateau fx     has a past medical history of Arthritis, Diabetes mellitus ([address] Hyperlipidemia, Hypertension, and Paroxysmal atrial fibrillation ([facility] [address] has a past surgical history that includes Cholecystectomy ().     Restrictions  Restrictions/Precautions  Restrictions/Precautions: Weight Bearing  Required Braces or Orthoses?: Yes  Lower Extremity Weight Bearing Restrictions  Right Lower Extremity Weight Bearing: Non Weight Bearing  Required Braces or Orthoses  Right Lower Extremity Brace: Knee Immobilizer  Right Upper Extremity Brace/Splint: Pt wears R UE splint for support w/WB; broke wrist 4/2019  Position Activity Restriction  Other position/activity restrictions: 59-year-old female with a history of diabetes, hyperlipidemia, hypertension, and A. fib on Eliquis who was admitted on 6/10 with right knee pain after falling down a few stairs. X-ray revealed a comminuted and mildly depressed fracture involving the lateral tibial plateau as well as a proximal fracture of the fibular head. Ortho evaluated and suggested conservative management with nonweightbearing in the right lower extremity. Therapy evaluated and suggested she continue in an inpatient setting prior to returning home. She reports her pain is controlled. She lives in a quad level home with multiple half staircases. Subjective   General  Chart Reviewed: Yes  Family / Caregiver Present: No  Referring Practitioner: Dr. Steffen Zaman  Subjective  Subjective: Patient denies pain at rest and with activity. General Comment  Comments: Patient seated in w/c upon arrival.  Ageeable to therapy session. Orientation  Orientation  Overall Orientation Status: Within Normal Limits    Cognition   Cognition  Overall Cognitive Status: WFL    Objective  Transfers  Sit to Stand: Moderate Assistance(min to mod (A) within session based on surface and fatigue)  Stand to sit: Moderate Assistance(min to mod (A) with VC for improved eccentric lowering)  Comment: toilet transfer with use of grab bar: mod (A) of 1. Assistance provided for LB clothing management. Pt self maintains restricted WB status during all transfers with VC. Ambulation  Ambulation?: No     Balance  Posture: Fair  Sitting - Static: Good  Sitting - Dynamic: Fair;+  Standing - Static: Poor;+  Standing - Dynamic: Poor      Comment: 1st session:  See above functional mobility.   In addition patient completes sit <=>  // bars x 3 completions. Within // bars patient completed (R) LE 3 way hip x 10, (L) heel raises x 10.      2nd session:  Pt seated in recliner upon arrival, reporting 4/10 (R) knee pain, agreeable to PT session. Stand pivot transfer recliner => w/c and w/c <=> standard bed, and w/c <=> seated stepper within session at mod (A). Toilet transfer completed at mod (A) + assistance for LB clothing management. Seated stepper L1: 3 min + 4 min to improve strength and cardiovascular endurance without use of (R) LE. Car transfer completed at mod (A), VC for techniques to clear (R) LE with immobilized past door. Manual w/c propulsion with (B) UE: 240 ft at SBA with VC for turning sequence. Supine <=> sit transfer completed on standard bed without HR at SBA. Pt remains in w/c within therapy gym upon completion, transitioning to OT therapy services.         Goals  Short term goals  Time Frame for Short term goals: 1 week  Short term goal 1: Supine to/from sit with Min A - goal met 6/19/2020  Short term goal 2: Sit to/from stand with Mod A x 1 - goal met 6/17/2020  Short term goal 3: Propel w/c x 76' with Min A - goal met 6/15/2020  Short term goal 4: Amb x 1 lap in 11 bars with Mod A  Long term goals  Time Frame for Long term goals : 2 weeks  Long term goal 1: Supine to/from sit with Mod I  Long term goal 2: Sit to/from stand with Mod I  Long term goal 3: Bed to/from w/c with Supervision  Long term goal 4: Amb. x 25' with FWW and Min A  Long term goal 5: Propel w/c x 150' with Mod I  Long term goal 6: Car transfer with [address] term goal 7: Up and down 4 steps backward with Min A   Patient Goals   Patient goals : Improve mobility    Plan    Plan  Times per week: 90 minutes/day, 5 days/wk  Times per day: Daily  Current Treatment Recommendations: ROM, Balance Training, Functional Mobility Training, Transfer Training, Neuromuscular Re-education, Endurance Training, Safety Education & Training, Gait Training, Equipment Evaluation, Education, & procurement, Patient/Caregiver Education & Training, Stair training, Home Exercise Program, ADL/Self-care Training, Strengthening, Pain Management, Modalities  Safety Devices  Type of devices:  All fall risk precautions in place, Call light within reach, Left in chair, Chair alarm in place, Gait belt  Restraints  Initially in place: No     Therapy Time   Individual Concurrent Group Co-treatment   Time In 0945         Time Out 1015         Minutes 30         Timed Code Treatment Minutes: 30 Minutes     Second Session Therapy Time:   Individual Concurrent Group Co-treatment   Time In 1245         Time Out 1345         Minutes 60           Timed Code Treatment Minutes:  30 + 60     Total Treatment Minutes:  90 minutes    Samantha Singh PT, DPT - [id number]

## 2020-06-19 NOTE — PLAN OF CARE
Problem: Falls - Risk of:  Goal: Will remain free from falls  Description: Will remain free from falls  6/19/2020 1616 by Dmitriy Sanchez RN  Outcome: Ongoing     Problem: Falls - Risk of:  Goal: Absence of physical injury  Description: Absence of physical injury  Outcome: Ongoing     Problem: Pain:  Goal: Pain level will decrease  Description: Pain level will decrease  6/19/2020 1616 by Dmitriy Sanchez RN  Outcome: Ongoing     Problem: Pain:  Goal: Control of acute pain  Description: Control of acute pain  Outcome: Ongoing     Problem: Pain:  Goal: Control of chronic pain  Description: Control of chronic pain  Outcome: Ongoing     Problem: IP BLADDER/VOIDING  Goal: LTG - patient will achieve acceptable level of continence  Outcome: Ongoing     Problem: IP BOWEL ELIMINATION  Goal: LTG - patient will have regular and routine bowel evacuation  Outcome: Ongoing     Problem: SKIN INTEGRITY  Goal: STG - patient will maintain good skin integrity  Outcome: Ongoing     Problem: Nutrition  Goal: Optimal nutrition therapy  Outcome: Ongoing

## 2020-06-19 NOTE — PROGRESS NOTES
Dejah Lomas  6/19/2020  [id number]    Chief Complaint: Closed fracture of right tibial plateau    Subjective:   No overnight events. No current complaints. Leg pain controlled. BP and glucose labile but overall appropriate. ROS: No CP, SOB, dyspnea    Objective:  Patient Vitals for the past 24 hrs:   BP Temp Temp src Pulse Resp SpO2 Weight   06/19/20 0937 (!) 152/53 98 °F (36.7 °C) Oral 63 16 -- --   06/19/20 0615 -- -- -- -- -- -- 146 lb 6.4 oz (66.4 kg)   06/18/20 2111 (!) 168/64 97.7 °F (36.5 °C) Oral 66 18 98 % --   06/18/20 1449 -- -- -- -- -- 99 % --     Gen: No distress, pleasant. Resting in bed  HEENT: Normocephalic, atraumatic  CV: Regular rate and rhythm. No MRG   Resp: No respiratory distress. CTAB   Abd: Soft, nontender nondistended  Ext: Mild RLE edema, calf pain remains, RLE in immobilizer  Neuro: Alert, oriented, appropriately interactive. Laboratory data: Available via EMR. Therapy progress:  PT  Required Braces or Orthoses  Right Lower Extremity Brace: Knee Immobilizer  Right Upper Extremity Brace/Splint: Pt wears R UE splint for support w/WB; broke wrist 4/2019  Position Activity Restriction  Other position/activity restrictions: 66-year-old female with a history of diabetes, hyperlipidemia, hypertension, and A. fib on [facility] who was admitted on 6/10 with right knee pain after falling down a few stairs. X-ray revealed a comminuted and mildly depressed fracture involving the lateral tibial plateau as well as a proximal fracture of the fibular head. Ortho evaluated and suggested conservative management with nonweightbearing in the right lower extremity. Therapy evaluated and suggested she continue in an inpatient setting prior to returning home. She reports her pain is controlled. She lives in a quad level home with multiple half staircases. Objective     Sit to Stand:  Moderate Assistance(mod (A) with VC for hand placement)  Stand to sit: Moderate Assistance  Bed to Chair: 2

## 2020-06-19 NOTE — PROGRESS NOTES
or Orthoses?: Yes  Lower Extremity Weight Bearing Restrictions  Right Lower Extremity Weight Bearing: Non Weight Bearing  Required Braces or Orthoses  Right Lower Extremity Brace: Knee Immobilizer  Right Upper Extremity Brace/Splint: Pt wears R UE splint for support w/WB; broke wrist 4/2019  Position Activity Restriction  Other position/activity restrictions: 49-year-old female with a history of diabetes, hyperlipidemia, hypertension, and A. fib on Eliquis who was admitted on 6/10 with right knee pain after falling down a few stairs. X-ray revealed a comminuted and mildly depressed fracture involving the lateral tibial plateau as well as a proximal fracture of the fibular head. Ortho evaluated and suggested conservative management with nonweightbearing in the right lower extremity. Therapy evaluated and suggested she continue in an inpatient setting prior to returning home. She reports her pain is controlled. She lives in a quad level home with multiple half staircases. Subjective   General  Chart Reviewed: Yes  Patient assessed for rehabilitation services?: Yes  Response to previous treatment: Patient with no complaints from previous session  Family / Caregiver Present: No  Referring Practitioner: Lennox Arias DO, for d/c planning  Diagnosis: R lateral tibia plateau fx  Subjective  Subjective: Pt supine in bed at entry, agreeable to OT session and shower  General Comment  Vital Signs  Patient Currently in Pain: Denies        Objective    ADL  Equipment Provided: Reacher;Sock aid  Grooming: Setup(oral care seated at sink; face washing in shower)  UE Bathing: Setup  LE Bathing: Supervision;Setup;Verbal cueing  UE Dressing: Setup(pt donned bra and t-shirt)  LE Dressing: Maximum assistance;Verbal cueing;Setup; Increased time to complete(assist to don/doff clothing over hips, Jennifer for use of reacher to thread LEs into pants/brief, Jennifer for use of sock aid to don L sock)  Toileting:  Moderate assistance(assist for clothing management)  Additional Comments: pt assisted to/from bathroom via w/c for ADLs. UB/LB bathing and dressing completed seated on shower chair. Balance  Sitting Balance: Supervision  Standing Balance: Minimal assistance(Jennifer-CGA)  Standing Balance  Time: ~6min total  Activity: functional transfers, ADL completion  Functional Mobility  Functional Mobility Comments: pt assisted via w/c this session  Toilet Transfers  Toilet - Technique: Stand pivot  Equipment Used: Standard toilet  Toilet Transfer: Moderate assistance  Toilet Transfers Comments: cues for hand placement, use of grab bar  Shower Transfers  Shower - Transfer From: Wheelchair  Shower - Transfer Type: To and From  Shower - Transfer To: Shower seat with back  Shower - Technique: Stand pivot  Shower Transfers: Minimal assistance  Shower Transfers Comments: use of grab bar, cues for hand placement  Bed mobility  Supine to Sit: Supervision  Scooting: Supervision  Comment: use of HR, HOB elevated  Transfers  Stand Pivot Transfers: Moderate assistance(ModA to Jennifer for pivot)  Sit to stand: Moderate assistance  Stand to sit: Moderate assistance  Transfer Comments: variable: ModA to Jennifer for sit<>stands, cues for effective hand placement and cues to sequence throughout SPTs         Cognition  Overall Cognitive Status: [employer]                    Second Session:  Pt seated in w/c in gym at entry, PT hand off to OT. Pt agreeable to OT session, pt denied pain initially however at end of session reporting R knee pain- RN notified. Pt completed dry tub transfer w/ TTB- pt required ModA for SPT w/c<>TTB w/ cues for technique and hand placement, Jennifer (for R LE) and moderate cues for safety/technique, increased time to complete. Pt completed ~123 ft of w/c mobility w/ B UE, increased time and SUP w/ occasional VCs for effective technique.  B UE TE w/ light resistance theraband to address strength and endurance for functional tasks: 12 reps x2 of shoulder flexion, forward punches, elbow flexion, and horizontal ab/aduction, rest breaks as needed. Toileting required at end of session, [facility] [address] [facility] for SPT,  MaxA for clothing management, pt able to complete adelia hygiene w/ set-up.  Pt left seated in recliner at end of session, chair alarm on, call light and needs within reach, Pt's brother and  in [address] per week: 90 minutes 5 days per week   Times per day: Daily  Specific instructions for Next Treatment:    Current Treatment Recommendations: Functional Mobility Training, Safety Education & Training, Balance Training, Self-Care / ADL, Endurance Training, Wheelchair Mobility Training, Patient/Caregiver Education & Training       Goals  Short term goals  Time Frame for Short term goals: 3 weeks   Short term goal 1: Mod I functional transfers to toilet and shower   Short term goal 2: mod I UB bathing/dressing  Short term goal 3: min A LB bathing/dressing   Short term goal 4: min A toileting   Short term goal 5: mod I functional mobility from w/c level for ADLs/IADLs  Patient Goals   Patient goals : \"to return home\"        Therapy Time   Individual Concurrent Group Co-treatment   Time In 0845, 1345         Time Out 0930, 1430         Minutes 45, 45              Timed Code Treatment Minutes:  45 + 45  Total Treatment Minutes:  90 min total      Riccardo Yanna, [facility], OTR/L #[id number]

## 2020-06-20 LAB
GLUCOSE BLD-MCNC: 103 MG/DL (ref 70–99)
GLUCOSE BLD-MCNC: 171 MG/DL (ref 70–99)
GLUCOSE BLD-MCNC: 202 MG/DL (ref 70–99)
GLUCOSE BLD-MCNC: 204 MG/DL (ref 70–99)
PERFORMED ON: ABNORMAL

## 2020-06-20 PROCEDURE — 6370000000 HC RX 637 (ALT 250 FOR IP): Performed by: PHYSICAL MEDICINE & REHABILITATION

## 2020-06-20 PROCEDURE — 1280000000 HC REHAB R&B

## 2020-06-20 RX ADMIN — DOCUSATE SODIUM 100 MG: 100 CAPSULE ORAL at 08:43

## 2020-06-20 RX ADMIN — INSULIN LISPRO 1 UNITS: 100 INJECTION, SOLUTION INTRAVENOUS; SUBCUTANEOUS at 12:07

## 2020-06-20 RX ADMIN — INSULIN LISPRO 2 UNITS: 100 INJECTION, SOLUTION INTRAVENOUS; SUBCUTANEOUS at 16:53

## 2020-06-20 RX ADMIN — GLIPIZIDE 5 MG: 5 TABLET ORAL at 06:10

## 2020-06-20 RX ADMIN — LATANOPROST 1 DROP: 50 SOLUTION OPHTHALMIC at 21:05

## 2020-06-20 RX ADMIN — DESMOPRESSIN ACETATE 40 MG: 0.2 TABLET ORAL at 08:43

## 2020-06-20 RX ADMIN — INSULIN LISPRO 1 UNITS: 100 INJECTION, SOLUTION INTRAVENOUS; SUBCUTANEOUS at 21:07

## 2020-06-20 RX ADMIN — SENNOSIDES 17.2 MG: 8.6 TABLET, FILM COATED ORAL at 21:04

## 2020-06-20 RX ADMIN — APIXABAN 2.5 MG: 2.5 TABLET, FILM COATED ORAL at 21:04

## 2020-06-20 RX ADMIN — APIXABAN 2.5 MG: 2.5 TABLET, FILM COATED ORAL at 08:43

## 2020-06-20 RX ADMIN — SENNOSIDES 17.2 MG: 8.6 TABLET, FILM COATED ORAL at 08:43

## 2020-06-20 RX ADMIN — LINAGLIPTIN 5 MG: 5 TABLET, FILM COATED ORAL at 08:43

## 2020-06-20 RX ADMIN — AMLODIPINE BESYLATE 5 MG: 5 TABLET ORAL at 08:43

## 2020-06-20 RX ADMIN — OXYCODONE 5 MG: 5 TABLET ORAL at 21:04

## 2020-06-20 RX ADMIN — OXYCODONE 5 MG: 5 TABLET ORAL at 05:21

## 2020-06-20 ASSESSMENT — PAIN DESCRIPTION - PAIN TYPE: TYPE: ACUTE PAIN

## 2020-06-20 ASSESSMENT — PAIN DESCRIPTION - PROGRESSION
CLINICAL_PROGRESSION: GRADUALLY IMPROVING
CLINICAL_PROGRESSION: GRADUALLY IMPROVING

## 2020-06-20 ASSESSMENT — PAIN DESCRIPTION - DESCRIPTORS: DESCRIPTORS: ACHING

## 2020-06-20 ASSESSMENT — PAIN DESCRIPTION - ONSET: ONSET: ON-GOING

## 2020-06-20 ASSESSMENT — PAIN - FUNCTIONAL ASSESSMENT: PAIN_FUNCTIONAL_ASSESSMENT: PREVENTS OR INTERFERES SOME ACTIVE ACTIVITIES AND ADLS

## 2020-06-20 ASSESSMENT — PAIN SCALES - GENERAL
PAINLEVEL_OUTOF10: 6
PAINLEVEL_OUTOF10: 2
PAINLEVEL_OUTOF10: 6
PAINLEVEL_OUTOF10: 2
PAINLEVEL_OUTOF10: 6

## 2020-06-20 ASSESSMENT — PAIN DESCRIPTION - ORIENTATION: ORIENTATION: RIGHT

## 2020-06-20 ASSESSMENT — PAIN DESCRIPTION - LOCATION: LOCATION: KNEE

## 2020-06-20 ASSESSMENT — PAIN DESCRIPTION - FREQUENCY: FREQUENCY: INTERMITTENT

## 2020-06-20 NOTE — PROGRESS NOTES
Dejah Lomas  6/20/2020  [id number]    Chief Complaint: Closed fracture of right tibial plateau    Subjective:   No overnight events. Patient seen this afternoon sitting up in room. She denies any new concerns. Pain is controlled. ROS: No CP, SOB, dyspnea    Objective:  Patient Vitals for the past 24 hrs:   BP Temp Temp src Pulse Resp SpO2 Weight   06/20/20 0840 (!) 152/61 98 °F (36.7 °C) Oral 62 16 93 % --   06/20/20 0521 -- -- -- -- -- -- 153 lb 9.6 oz (69.7 kg)   06/19/20 2130 (!) 163/62 97.9 °F (36.6 °C) Oral 90 16 92 % --     Gen: No distress, pleasant. HEENT: Normocephalic, atraumatic  CV: Regular rate and rhythm. No MRG   Resp: No respiratory distress. CTAB   Abd: Soft, nontender nondistended  Ext: Mild RLE edema, calf pain remains, RLE in immobilizer  Neuro: Alert, oriented, appropriately interactive. Laboratory data: Available via EMR. Therapy progress:  PT  Required Braces or Orthoses  Right Lower Extremity Brace: Knee Immobilizer  Right Upper Extremity Brace/Splint: Pt wears R UE splint for support w/WB; broke wrist 4/2019  Position Activity Restriction  Other position/activity restrictions: 80-year-old female with a history of diabetes, hyperlipidemia, hypertension, [facility] with right knee pain after falling down a few stairs. X-ray revealed a comminuted and mildly depressed fracture involving the lateral tibial plateau as well as a proximal fracture of the fibular head. Ortho evaluated and suggested conservative management with nonweightbearing in the right lower extremity. Therapy evaluated and suggested she continue in an inpatient setting prior to returning home. She reports her pain is controlled. She lives in a quad level home with multiple half staircases. Objective     Sit to Stand:  Moderate Assistance(min to mod (A) within session based on surface and fatigue)  Stand to sit: Moderate Assistance(min to mod (A) with VC for improved

## 2020-06-20 NOTE — PLAN OF CARE
Problem: Falls - Risk of:  Goal: Will remain free from falls  Description: Will remain free from falls  6/19/2020 2205 by LENCHO Long  Outcome: Ongoing     Problem: Falls - Risk of:  Goal: Absence of physical injury  Description: Absence of physical injury  6/19/2020 2205 by LENCHO Long  Outcome: Ongoing     Problem: Pain:  Goal: Pain level will decrease  Description: Pain level will decrease  6/19/2020 2205 by LENCHO Long  Outcome: Ongoing     Problem: Pain:  Goal: Control of acute pain  Description: Control of acute pain  6/19/2020 2205 by LENCHO Long  Outcome: Ongoing     Problem: Pain:  Goal: Control of chronic pain  Description: Control of chronic pain  6/19/2020 2205 by LENCHO Long  Outcome: Ongoing     Problem: IP BLADDER/VOIDING  Goal: LTG - patient will achieve acceptable level of continence  6/19/2020 2205 by LENCHO Long  Outcome: Ongoing     Problem: IP BOWEL ELIMINATION  Goal: LTG - patient will have regular and routine bowel evacuation  6/19/2020 2205 by LENCHO Long  Outcome: Ongoing     Problem: SKIN INTEGRITY  Goal: STG - patient will maintain good skin integrity  6/19/2020 2205 by Ethan RN  Outcome: Ongoing     Problem: Nutrition  Goal: Optimal nutrition therapy  6/19/2020 2205 by LENCHO Long  Outcome: Ongoing

## 2020-06-20 NOTE — PROGRESS NOTES
Pt complained of pain in the right limb. 4 (0-10)  PRN oxycodone 5mg given as ordered. RN will continue to monitor pt .

## 2020-06-21 LAB
GLUCOSE BLD-MCNC: 102 MG/DL (ref 70–99)
GLUCOSE BLD-MCNC: 118 MG/DL (ref 70–99)
GLUCOSE BLD-MCNC: 152 MG/DL (ref 70–99)
GLUCOSE BLD-MCNC: 212 MG/DL (ref 70–99)
PERFORMED ON: ABNORMAL

## 2020-06-21 PROCEDURE — 94760 N-INVAS EAR/PLS OXIMETRY 1: CPT

## 2020-06-21 PROCEDURE — 6370000000 HC RX 637 (ALT 250 FOR IP): Performed by: PHYSICAL MEDICINE & REHABILITATION

## 2020-06-21 PROCEDURE — 1280000000 HC REHAB R&B

## 2020-06-21 RX ADMIN — DESMOPRESSIN ACETATE 40 MG: 0.2 TABLET ORAL at 08:11

## 2020-06-21 RX ADMIN — GLIPIZIDE 5 MG: 5 TABLET ORAL at 03:00

## 2020-06-21 RX ADMIN — APIXABAN 2.5 MG: 2.5 TABLET, FILM COATED ORAL at 20:27

## 2020-06-21 RX ADMIN — LATANOPROST 1 DROP: 50 SOLUTION OPHTHALMIC at 20:27

## 2020-06-21 RX ADMIN — AMLODIPINE BESYLATE 5 MG: 5 TABLET ORAL at 08:11

## 2020-06-21 RX ADMIN — APIXABAN 2.5 MG: 2.5 TABLET, FILM COATED ORAL at 08:11

## 2020-06-21 RX ADMIN — OXYCODONE 10 MG: 5 TABLET ORAL at 19:27

## 2020-06-21 RX ADMIN — INSULIN LISPRO 1 UNITS: 100 INJECTION, SOLUTION INTRAVENOUS; SUBCUTANEOUS at 23:12

## 2020-06-21 RX ADMIN — INSULIN LISPRO 2 UNITS: 100 INJECTION, SOLUTION INTRAVENOUS; SUBCUTANEOUS at 17:24

## 2020-06-21 RX ADMIN — LINAGLIPTIN 5 MG: 5 TABLET, FILM COATED ORAL at 08:11

## 2020-06-21 RX ADMIN — OXYCODONE 10 MG: 5 TABLET ORAL at 14:59

## 2020-06-21 ASSESSMENT — PAIN DESCRIPTION - FREQUENCY: FREQUENCY: INTERMITTENT

## 2020-06-21 ASSESSMENT — PAIN DESCRIPTION - DESCRIPTORS: DESCRIPTORS: ACHING

## 2020-06-21 ASSESSMENT — PAIN DESCRIPTION - PROGRESSION
CLINICAL_PROGRESSION: GRADUALLY IMPROVING
CLINICAL_PROGRESSION: NOT CHANGED
CLINICAL_PROGRESSION: GRADUALLY IMPROVING

## 2020-06-21 ASSESSMENT — PAIN DESCRIPTION - ONSET
ONSET: ON-GOING
ONSET: ON-GOING

## 2020-06-21 ASSESSMENT — PAIN SCALES - GENERAL
PAINLEVEL_OUTOF10: 0
PAINLEVEL_OUTOF10: 7
PAINLEVEL_OUTOF10: 9
PAINLEVEL_OUTOF10: 4
PAINLEVEL_OUTOF10: 3

## 2020-06-21 ASSESSMENT — PAIN DESCRIPTION - LOCATION: LOCATION: KNEE

## 2020-06-21 ASSESSMENT — PAIN DESCRIPTION - PAIN TYPE
TYPE: ACUTE PAIN
TYPE: ACUTE PAIN

## 2020-06-21 ASSESSMENT — PAIN DESCRIPTION - DIRECTION: RADIATING_TOWARDS: RIGHT CALF

## 2020-06-21 ASSESSMENT — PAIN DESCRIPTION - ORIENTATION: ORIENTATION: RIGHT

## 2020-06-21 NOTE — PLAN OF CARE
Problem: Falls - Risk of:  Goal: Will remain free from falls  Description: Will remain free from falls  6/21/2020 1104 by Maris Mosley RN  Outcome: Ongoing  Note: Pt remains free from falls. Safety precautions in place. Bed in lowest position, bed/chair wheels locked, call light with in reach, bed/chair alarm on, fall risk wrist band on, SAFE outside of doorway. Will continue to monitor.

## 2020-06-21 NOTE — PLAN OF CARE
Problem: Falls - Risk of:  Goal: Will remain free from falls  Description: Will remain free from falls  6/20/2020 2112 by LENCHO Long  Outcome: Ongoing     Problem: Falls - Risk of:  Goal: Absence of physical injury  Description: Absence of physical injury  Outcome: Ongoing     Problem: Pain:  Goal: Pain level will decrease  Description: Pain level will decrease  Outcome: Ongoing     Problem: Pain:  Goal: Control of acute pain  Description: Control of acute pain  Outcome: Ongoing     Problem: Pain:  Goal: Control of chronic pain  Description: Control of chronic pain  Outcome: Ongoing     Problem: IP BLADDER/VOIDING  Goal: LTG - patient will achieve acceptable level of continence  Outcome: Ongoing     Problem: IP BOWEL ELIMINATION  Goal: LTG - patient will have regular and routine bowel evacuation  Outcome: Ongoing     Problem: SKIN INTEGRITY  Goal: STG - patient will maintain good skin integrity  Outcome: Ongoing     Problem: Nutrition  Goal: Optimal nutrition therapy  Outcome: Ongoing

## 2020-06-22 ENCOUNTER — TELEPHONE (OUTPATIENT)
Dept: ORTHOPEDIC SURGERY | Age: 85
End: 2020-06-22

## 2020-06-22 ENCOUNTER — APPOINTMENT (OUTPATIENT)
Dept: GENERAL RADIOLOGY | Age: 85
DRG: 560 | End: 2020-06-22
Attending: PHYSICAL MEDICINE & REHABILITATION
Payer: MEDICARE

## 2020-06-22 LAB
ANION GAP SERPL CALCULATED.3IONS-SCNC: 8 MMOL/L (ref 3–16)
BUN BLDV-MCNC: 20 MG/DL (ref 7–20)
CALCIUM SERPL-MCNC: 8.1 MG/DL (ref 8.3–10.6)
CHLORIDE BLD-SCNC: 107 MMOL/L (ref 99–110)
CO2: 22 MMOL/L (ref 21–32)
CREAT SERPL-MCNC: 1.1 MG/DL (ref 0.6–1.2)
GFR AFRICAN AMERICAN: 57
GFR NON-AFRICAN AMERICAN: 47
GLUCOSE BLD-MCNC: 119 MG/DL (ref 70–99)
GLUCOSE BLD-MCNC: 126 MG/DL (ref 70–99)
GLUCOSE BLD-MCNC: 134 MG/DL (ref 70–99)
GLUCOSE BLD-MCNC: 274 MG/DL (ref 70–99)
HCT VFR BLD CALC: 27.2 % (ref 36–48)
HEMOGLOBIN: 8.6 G/DL (ref 12–16)
MCH RBC QN AUTO: 25.1 PG (ref 26–34)
MCHC RBC AUTO-ENTMCNC: 31.7 G/DL (ref 31–36)
MCV RBC AUTO: 79.3 FL (ref 80–100)
PDW BLD-RTO: 16.9 % (ref 12.4–15.4)
PERFORMED ON: ABNORMAL
PLATELET # BLD: 338 K/UL (ref 135–450)
PMV BLD AUTO: 7.9 FL (ref 5–10.5)
POTASSIUM SERPL-SCNC: 4.8 MMOL/L (ref 3.5–5.1)
RBC # BLD: 3.43 M/UL (ref 4–5.2)
SODIUM BLD-SCNC: 137 MMOL/L (ref 136–145)
WBC # BLD: 7 K/UL (ref 4–11)

## 2020-06-22 PROCEDURE — 97110 THERAPEUTIC EXERCISES: CPT

## 2020-06-22 PROCEDURE — 36415 COLL VENOUS BLD VENIPUNCTURE: CPT

## 2020-06-22 PROCEDURE — 97530 THERAPEUTIC ACTIVITIES: CPT

## 2020-06-22 PROCEDURE — 92526 ORAL FUNCTION THERAPY: CPT

## 2020-06-22 PROCEDURE — 80048 BASIC METABOLIC PNL TOTAL CA: CPT

## 2020-06-22 PROCEDURE — 92610 EVALUATE SWALLOWING FUNCTION: CPT

## 2020-06-22 PROCEDURE — 85027 COMPLETE CBC AUTOMATED: CPT

## 2020-06-22 PROCEDURE — 6370000000 HC RX 637 (ALT 250 FOR IP): Performed by: PHYSICAL MEDICINE & REHABILITATION

## 2020-06-22 PROCEDURE — 99231 SBSQ HOSP IP/OBS SF/LOW 25: CPT | Performed by: ORTHOPAEDIC SURGERY

## 2020-06-22 PROCEDURE — 73560 X-RAY EXAM OF KNEE 1 OR 2: CPT

## 2020-06-22 PROCEDURE — 1280000000 HC REHAB R&B

## 2020-06-22 PROCEDURE — 97535 SELF CARE MNGMENT TRAINING: CPT

## 2020-06-22 RX ORDER — AMLODIPINE BESYLATE 5 MG/1
7.5 TABLET ORAL DAILY
Status: DISCONTINUED | OUTPATIENT
Start: 2020-06-23 | End: 2020-06-27

## 2020-06-22 RX ADMIN — GLIPIZIDE 5 MG: 5 TABLET ORAL at 06:51

## 2020-06-22 RX ADMIN — DESMOPRESSIN ACETATE 40 MG: 0.2 TABLET ORAL at 07:57

## 2020-06-22 RX ADMIN — OXYCODONE 10 MG: 5 TABLET ORAL at 06:54

## 2020-06-22 RX ADMIN — LINAGLIPTIN 5 MG: 5 TABLET, FILM COATED ORAL at 07:57

## 2020-06-22 RX ADMIN — APIXABAN 2.5 MG: 2.5 TABLET, FILM COATED ORAL at 07:57

## 2020-06-22 RX ADMIN — SENNOSIDES 17.2 MG: 8.6 TABLET, FILM COATED ORAL at 07:57

## 2020-06-22 RX ADMIN — INSULIN LISPRO 3 UNITS: 100 INJECTION, SOLUTION INTRAVENOUS; SUBCUTANEOUS at 12:15

## 2020-06-22 RX ADMIN — AMLODIPINE BESYLATE 5 MG: 5 TABLET ORAL at 07:57

## 2020-06-22 RX ADMIN — LATANOPROST 1 DROP: 50 SOLUTION OPHTHALMIC at 23:02

## 2020-06-22 RX ADMIN — APIXABAN 2.5 MG: 2.5 TABLET, FILM COATED ORAL at 22:45

## 2020-06-22 RX ADMIN — OXYCODONE 10 MG: 5 TABLET ORAL at 15:08

## 2020-06-22 RX ADMIN — OXYCODONE 5 MG: 5 TABLET ORAL at 22:45

## 2020-06-22 RX ADMIN — SENNOSIDES 17.2 MG: 8.6 TABLET, FILM COATED ORAL at 22:45

## 2020-06-22 ASSESSMENT — PAIN DESCRIPTION - LOCATION
LOCATION: KNEE
LOCATION: KNEE

## 2020-06-22 ASSESSMENT — PAIN DESCRIPTION - PAIN TYPE
TYPE: ACUTE PAIN

## 2020-06-22 ASSESSMENT — PAIN SCALES - GENERAL
PAINLEVEL_OUTOF10: 6
PAINLEVEL_OUTOF10: 2
PAINLEVEL_OUTOF10: 4
PAINLEVEL_OUTOF10: 7
PAINLEVEL_OUTOF10: 7
PAINLEVEL_OUTOF10: 0
PAINLEVEL_OUTOF10: 6

## 2020-06-22 ASSESSMENT — PAIN DESCRIPTION - PROGRESSION
CLINICAL_PROGRESSION: GRADUALLY IMPROVING

## 2020-06-22 ASSESSMENT — PAIN DESCRIPTION - DESCRIPTORS
DESCRIPTORS: ACHING
DESCRIPTORS: ACHING

## 2020-06-22 ASSESSMENT — PAIN DESCRIPTION - ORIENTATION
ORIENTATION: RIGHT
ORIENTATION: RIGHT

## 2020-06-22 ASSESSMENT — PAIN DESCRIPTION - ONSET
ONSET: ON-GOING

## 2020-06-22 ASSESSMENT — PAIN DESCRIPTION - FREQUENCY
FREQUENCY: INTERMITTENT
FREQUENCY: INTERMITTENT

## 2020-06-22 ASSESSMENT — PAIN - FUNCTIONAL ASSESSMENT
PAIN_FUNCTIONAL_ASSESSMENT: 0-10
PAIN_FUNCTIONAL_ASSESSMENT: PREVENTS OR INTERFERES SOME ACTIVE ACTIVITIES AND ADLS

## 2020-06-22 NOTE — PROGRESS NOTES
Dejah Lomas  6/22/2020  [id number]    Chief Complaint: Closed fracture of right tibial plateau    Subjective:   No overnight events. Patient seen this afternoon sitting up in room. She reports feeling well. Pain controlled. Moving her bowels. ROS: No CP, SOB, dyspnea    Objective:  Patient Vitals for the past 24 hrs:   BP Temp Temp src Pulse Resp SpO2 Weight   06/22/20 0754 (!) 167/60 98.3 °F (36.8 °C) Oral 65 20 93 % --   06/22/20 0500 -- -- -- -- -- -- 149 lb 6.4 oz (67.8 kg)   06/21/20 2024 (!) 183/54 98.1 °F (36.7 °C) Oral 61 16 100 % --     Gen: No distress, pleasant. HEENT: Normocephalic, atraumatic  CV: Regular rate and rhythm. No MRG   Resp: No respiratory distress. CTAB   Abd: Soft, nontender nondistended  Ext: Mild RLE edema, calf pain remains, RLE in immobilizer  Neuro: Alert, oriented, appropriately interactive. Laboratory data: Available via EMR. Therapy progress:  PT  Required Braces or Orthoses  Right Lower Extremity Brace: Knee Immobilizer  Right Upper Extremity Brace/Splint: Pt wears R UE splint for support w/WB; broke wrist 4/2019  Position Activity Restriction  Other position/activity restrictions: 68-year-old female with a history of diabetes, hyperlipidemia, hypertension, and A. fib on [facility] who was admitted on 6/10 with right knee pain after falling down a few stairs. X-ray revealed a comminuted and mildly depressed fracture involving the lateral tibial plateau as well as a proximal fracture of the fibular head. Ortho evaluated and suggested conservative management with nonweightbearing in the right lower extremity. Therapy evaluated and suggested she continue in an inpatient setting prior to returning home. She reports her pain is controlled. She lives in a quad level home with multiple half staircases.   Objective     Sit to Stand: Contact guard assistance(CGA requires multiple attempts to achieve standing position)  Stand to sit: Minimal Assistance(CGA to min (A) based on surface height and fatigue)  Bed to Chair: 2 Person Assistance(Max A x 2 )     OT  PT Equipment Recommendations  Equipment Needed: Yes  Mobility Devices: Wheelchair  Wheelchair: Light Weight  Other: The pt will need a w/c as she is NWB s/p tibial plateau fracture. She has a transport chair which she is unable to self propel  Toilet - Technique: Stand pivot  Equipment Used: Standard toilet  Toilet Transfers Comments: cues for hand placement, use of grab bar  Assessment        SLP  Current Diet : Regular  Current Liquid Diet : Thin  Diet Solids Recommendation: Regular(Pt able to select soft food items she knows she can tolerate without lower dentures in place)  Liquid Consistency Recommendation: Thin    Body mass index is 26.47 kg/m². Assessment:  Patient Active Problem List   Diagnosis    Paroxysmal atrial fibrillation (HCC)    Closed fracture of lateral portion of right tibial plateau    Chronic anticoagulation    Closed fracture of upper end of right fibula    Fall on stairs    Closed fracture of right tibial plateau       Plan:   Right tibial plateau fracture: NWB in immobilizer. Pain control. PT/OT     Proximal right fibular fracture: as above     Afib: eliquis     HTN: norvasc increase to 7.5     HLD: lipitor 40     DM: glipizide 5, tradjenta 5, SSI    Right calf pain: negative DVT US     Bowels: Per protocol  Bladder: Per protocol  Sleep: Trazodone provided prn. Pain: tylenol, braxton PRN   DVT PPx: ara So MD 6/22/2020, 4:02 PM    * This document was created using dictation software. While all precautions were taken to ensure accuracy, errors may have occurred. Please disregard any typographical errors.

## 2020-06-22 NOTE — TELEPHONE ENCOUNTER
Patient is at [facility] in [address] [place] [address] for her R knee.

## 2020-06-22 NOTE — PROGRESS NOTES
Physical Therapy  Facility/Department: [facility] ACUTE REHAB UNIT  Daily Treatment Note  NAME: Peewee Farooq  : 1933  MRN: [id number]    Date of Service: 2020    Discharge Recommendations:  Home with Home health PT, 24 hour supervision or assist, S Level 3   PT Equipment Recommendations  Equipment Needed: Yes  Mobility Devices: Wheelchair  Wheelchair: Light Weight  Other: The pt will need a w/c as she is NWB s/p tibial plateau fracture. She has a transport chair which she is unable to self propel    Assessment   Body structures, Functions, Activity limitations: Decreased functional mobility ; Decreased ROM; Decreased strength;Decreased balance;Decreased endurance; Increased pain;Decreased ADL status; Decreased safe awareness  Assessment: Pt demonstrating significant improvement in transfer levels including ability to achieve standing position at [facility]. Pt continues to require VC for proper transfer set up and techniques to improve functional independence. Pt remains functionally limited by restricted WB status on (R) LE. Pt will benefit from continued skilled therapy services to improve functional mobility level. Treatment Diagnosis: Impaired balance, impaired functional mobility  Prognosis: Good  PT Education: Goals;Transfer Training;Plan of Care; Functional Mobility Training;General Safety  Patient Education: pt verbalized understanding   Barriers to Learning: hearing  REQUIRES PT FOLLOW UP: Yes  Activity Tolerance  Activity Tolerance: Patient Tolerated treatment well  Activity Tolerance: NWB status limiting functional mobility completion. Patient Diagnosis(es): (R) Tibial Plateau Fracture     has a past medical history of Arthritis, Diabetes mellitus ([address] Hyperlipidemia, Hypertension, and Paroxysmal atrial fibrillation ([facility] [address] has a past surgical history that includes Cholecystectomy ().     Restrictions  Restrictions/Precautions  Restrictions/Precautions: Weight Bearing  Required Braces or maintains restricted WB status during all transfers with VC. Ambulation  Ambulation?: No     Balance  Posture: Fair  Sitting - Static: Good  Sitting - Dynamic: Good;-  Standing - Static: Fair;-(maintains static stance to grab bar at CGA)  Standing - Dynamic: Poor;+  Exercises  Ankle Pumps: 1 x 20  Comments: seated stepper: L1: 2 min x 3 intervals with rest breaks between due to fatigue         Comment: 1st session:  See above functional mobility and exercise. 2nd session:  Pt seated in recliner upon arrival, reporting 2/10 pain, agreeable to PT session. Toilet transfer completed with horizontal grab bar at CGA + min (A) for clothing management. Manual w/c propulsion with (B) UE, 300 ft at supervision level including management of doorways and turning. VC required for transfer setup. Educated on self removal of leg rest.  Discussion held regarding discharge plan/location in regards to home layout. Per patient plan is to enter through garage into lower level of home and remain on that level at w/c level. Patient reports having place to sleep and 1/2 bath on lower level. Patient intends on completing sponge bath and have family assistance for meal prep until WB status is advanced.       Goals  Short term goals  Time Frame for Short term goals: 1 week  Short term goal 1: Supine to/from sit with Min A - goal met 6/19/2020  Short term goal 2: Sit to/from stand with Mod A x 1 - goal met 6/17/2020  Short term goal 3: Propel w/c x 76' with Min A - goal met 6/15/2020  Short term goal 4: Amb x 1 lap in 11 bars with Mod A  Long term goals  Time Frame for Long term goals : 2 weeks  Long term goal 1: Supine to/from sit with Mod I  Long term goal 2: Sit to/from stand with Mod I  Long term goal 3: Bed to/from w/c with Supervision  Long term goal 4: Amb. x 25' with FWW and Min A  Long term goal 5: Propel w/c x 150' with Mod I  Long term goal 6: Car transfer with [employer]  Long term goal 7: Up and down 4 steps backward with Min A   Patient Goals   Patient goals : Improve mobility    Plan    Plan  Times per week: 90 minutes/day, 5 days/wk  Times per day: Daily  Current Treatment Recommendations: ROM, Balance Training, Functional Mobility Training, Transfer Training, Neuromuscular Re-education, Endurance Training, Safety Education & Training, Gait Training, Equipment Evaluation, Education, & procurement, Patient/Caregiver Education & Training, Stair training, Home Exercise Program, ADL/Self-care Training, Strengthening, Pain Management, Modalities  Safety Devices  Type of devices:  All fall risk precautions in place, Call light within reach, Left in chair, Chair alarm in place, Gait belt  Restraints  Initially in place: No     Therapy Time   Individual Concurrent Group Co-treatment   Time In 0915         Time Out 1015         Minutes 60         Timed Code Treatment Minutes: [address] Time:   Individual Concurrent Group Co-treatment   Time In 1408         Time Out 1438         Minutes 30           Timed Code Treatment Minutes:  60 + 30    Total Treatment Minutes:  90 minutes    Jose Preciado PT, DPT - [id number]

## 2020-06-22 NOTE — PROGRESS NOTES
Occupational Therapy  Facility/Department: [facility] ACUTE REHAB UNIT  Daily Treatment Note  NAME: Celeste Rodriguez  : 1933  MRN: [id number]    Date of Service: 2020    Discharge Recommendations:  Home with Home health OT, S Level 3, Home with assist PRN  OT Equipment Recommendations  Equipment Needed: Yes  Mobility Devices: ADL Assistive Devices  ADL Assistive Devices: Transfer Tub Bench  Other: has raised toilet seat, hip kit, will likely require w/c for mobility     Assessment   Performance deficits / Impairments: Decreased functional mobility ; Decreased balance;Decreased ADL status; Decreased endurance;Decreased high-level IADLs  Assessment: Pt is not at her baseline of occupational function. Pt currently requiring  Min A for functional transfers and Min A for toileting. Pt would benefit from continued intensive skilled acute OT services to addressed these deficits. Continue with POC. Treatment Diagnosis: Decreased ADL/IADL status, functional mobility, endurance and balance associated with R lateral tibia plateau fx  Prognosis: Good  Assistance / Modification: Min A for functional transfers  OT Education: Transfer Training;OT Role  Patient Education: continue to reinforce for carryover of education  REQUIRES OT FOLLOW UP: Yes  Activity Tolerance  Activity Tolerance: Patient Tolerated treatment well  Safety Devices  Safety Devices in place: Yes  Type of devices: Left in chair;Call light within reach; Chair alarm in place; All fall risk precautions in place         Patient Diagnosis(es): Tib Fx     has a past medical history of Arthritis, Diabetes mellitus ([facility] [address] Hyperlipidemia, Hypertension, and Paroxysmal atrial fibrillation ([facility] [address] has a past surgical history that includes Cholecystectomy ().     Restrictions  Restrictions/Precautions  Restrictions/Precautions: Weight Bearing  Required Braces or Orthoses?: Yes  Lower Extremity Weight Bearing Restrictions  Right Lower Extremity Weight Bearing: Non Weight Bearing  Required Braces or Orthoses  Right Lower Extremity Brace: Knee Immobilizer  Right Upper Extremity Brace/Splint: Pt wears R UE splint for support w/WB; broke wrist 4/2019  Position Activity Restriction  Other position/activity restrictions: 59-year-old female with a history of diabetes, hyperlipidemia, hypertension, and A. fib on Eliquis who was admitted on 6/10 with right knee pain after falling down a few stairs. X-ray revealed a comminuted and mildly depressed fracture involving the lateral tibial plateau as well as a proximal fracture of the fibular head. Ortho evaluated and suggested conservative management with nonweightbearing in the right lower extremity. Therapy evaluated and suggested she continue in an inpatient setting prior to returning home. She reports her pain is controlled. She lives in a quad level home with multiple half staircases. Subjective   General  Chart Reviewed: Yes  Patient assessed for rehabilitation services?: Yes  Response to previous treatment: Patient with no complaints from previous session  Family / Caregiver Present: No  Referring Practitioner: Hima Willams DO, for d/c planning  Diagnosis: R lateral tibia plateau fx  Subjective  Subjective: Pt supine in bed at entry with [address] donned, pleasant and agreeable to therapy session/ADL shower. General Comment  Comments: Pt supine to sit Supervision. Pt sit to stand/stand pivot/stand to sit Min A EOB to wc. Pt in bathroom, stand pivot toilet transfer (Mod A), pt toileted (CGA to pull brief down/hygiene). Pt shower transfer Min A and pt bathed UB (setup) and LB (SBA for weight shifting to wash buttocks seated on shower chair). Pt dressed UB (setup) and LB (setup/Mod A to pull up brief and shorts). Pt seated at sink for oral care/comb hair (Independent). Pt transfer Min A to recliner, call light in reach and chair alarm on.   Vital Signs  Patient Currently in Pain: Denies     Orientation  Orientation  Overall per day: Daily  Specific instructions for Next Treatment:    Current Treatment Recommendations: Functional Mobility Training, Safety Education & Training, Balance Training, Self-Care / ADL, Endurance Training, Wheelchair Mobility Training, Patient/Caregiver Education & Training    Goals  Short term goals  Time Frame for Short term goals: 3 weeks   Short term goal 1: Mod I functional transfers to toilet and shower   Short term goal 2: mod I UB bathing/dressing  Short term goal 3: min A LB bathing/dressing - goal met bathing 6/22  Short term goal 4: min A toileting - goal met 6/22  Short term goal 5: mod I functional mobility from w/c level for ADLs/IADLs  Patient Goals   Patient goals : \"to return home\"        Therapy Time   Individual Concurrent Group Co-treatment   Time In 0830         Time Out 0915         Minutes 45         Timed Code Treatment Minutes: [address] Time   Individual Concurrent Group Co-treatment   Time In 1100         Time Out 8406         Minutes 45         Timed Code Treatment Minutes: [facility], [facility]   Cosign: Mychal Nascimento OTR/L, [place] #[id number]

## 2020-06-22 NOTE — PROGRESS NOTES
[facility] Orthopedic Surgery   Progress Note    CHIEF COMPLAINT/DIAGNOSIS:    RIGHT lateral tibial plateau fracture  RIGHT fibular head fracture    SUBJECTIVE: Patient sitting up in chair having just worked with therapy; describes no pain, but notes that she usually needs pain medication in AM and PM.  D/W her ok to DC knee immobilizer when sitting in chair or lying in bed to avoid skin irritation. OBJECTIVE  Physical    VITALS:  BP (!) 167/60   Pulse 65   Temp 98.3 °F (36.8 °C) (Oral)   Resp 20   Ht 5' 3\" (1.6 m)   Wt 149 lb 6.4 oz (67.8 kg)   SpO2 93%   BMI 26.47 kg/m²     GENERAL: Alert, NAD   MUSCULOSKELETAL: RIGHT LE  Moderate swelling of knee; resolving anterior ecchymosis over tibial plateau. Nontender to palpation over tibial plateau, proximal fibula  ROM: intact DF/PF  Sensory:  Intact to light touch  Vascular:  Intact post tib pulse; calf nontender    Data    ALL MEDICATIONS HAVE BEEN REVIEWED    CBC:   Recent Labs     06/22/20  0728   WBC 7.0   HGB 8.6*   HCT 27.2*        BMP:   Recent Labs     06/22/20  0727      K 4.8      CO2 22   BUN 20   CREATININE 1.1     INR: No results for input(s): INR in the last 72 hours. ASSESSMENT:  RIGHT lateral tibial plateau fracture, comminuted  RIGHT fibular head fracture, comminuted  DM  HTN  PAF on home Eliquis  HX DVT LEFT LE (2019)    PLAN:   - WB status:  Remain NWB for approx 10 weeks from injury. Knee immobilizer when OOB; may leave open if in bed to prevent skin irritation  - DVT prophylaxis: Home Eliquis  - PT/OT  - D/C Plan: per Acute Rehab  - Pain Control: current regimen. Due to orthopaedic surgical procedure/condition, patient may require pain medication for up to 6-8 weeks. - F U xrays today. - F U with Dr. Vianey Javier in 1 month; call [phone] to schedule appt.        ADAM HOWARD-CNP  6/22/2020  2:52 PM    .

## 2020-06-22 NOTE — CARE COORDINATION
Called pt's son, Sima Armenta to discuss DME company for wheelchair- they had a poor experience w/ [employer] and would like to use [employer]. Sima Armenta is aware of d/c date now being 7/1, met with pt too to update. All questions answered and support provided.  Isis Daniel, MSW, LSW

## 2020-06-22 NOTE — PROGRESS NOTES
Speech Language Pathology  Facility/Department: [facility] ACUTE REHAB UNIT   CLINICAL BEDSIDE SWALLOW EVALUATION    NAME: Nisha Oneal  : 1933  MRN: [id number]    ADMISSION DATE: 2020  ADMITTING DIAGNOSIS: has Paroxysmal atrial fibrillation ([address] Closed fracture of lateral portion of right tibial plateau; Chronic anticoagulation; Closed fracture of upper end of right fibula; Fall on stairs; and Closed fracture of right tibial plateau on their problem list.     Swallow Evaluation Orders placed by RN due to concerns with coughing/ choking while eating. Pt denies difficulty but does endorse intermittent instances of a dry chronic cough (in which pt reported occurring when she was eating previous date). Reports she has seen her PCP and pulmonologist for her dry cough and they recommended she take hydrocodone/ Tussinex to help with cough. Pt denies any difficulties with eating/ drinking but reports slight difficulty eating hard solids due to not having bottom dentures present. Provided education (see below). No further need for speech therapy for dysphagia tx at this time. Recent Chest Xray/CT of Chest:   No recent imaging on file. Per pt report Pulmonologist reported lungs being clear on CXR. Date of Eval: 2020  Evaluating Therapist: Lyudmila Mosqurea    Current Diet level:  Current Diet : Regular  Current Liquid Diet : Thin    Primary Complaint  Patient Complaint: Only difficulty pt reports is that some food items are too hard to chew without lower dentures at this time. Does endorse being dx with dry chronic cough a few years ago and she takes hydrocodone at home for relief.      Reason for Referral  Nisha Oneal was referred for a bedside swallow evaluation to assess the efficiency of her swallow function, identify signs and symptoms of aspiration and make recommendations regarding safe dietary consistencies, effective compensatory strategies, and safe eating environment. Impression  Dysphagia Diagnosis: Swallow function appears grossly intact  Dysphagia Impression : Suspect presbyphagia with mild oropharyngeal deficits related to current age due to suspicion of premature bolus loss and slight swallow delay with occasional audible swallows. OME revealed slightly reduced lingual ROM/ coordination, which is not negatively impacting swallowing at this time. Of note pt presented with dry lingual surface indicative of xerostomia. Pt denies any difficulties with eating/ drinking and attributes difficulty RN reported due to intermittent dry coughing episodes she occasionally experiences (which was around a meal previous date). Pt reports difficulty chewing lettuce, hard vegetables and meats at this time due to not having lower dentures present (reports plate is at home). Notified dietary to double steam all vegetables, provided education to pt to request extra sauces to moisten food with and to request a dental soft diet if she feels like she is having a hard time with multiple food items. Pt v/u and in agreement. Pt reports she can handle pills if provided 1-2 at a time. Discussed placing pills whole in puree if she notices difficulty. Educated pt on overt s/s of education, reason/ rationale for swallow evaluation and no further recommended speech therapy for dysphagia treatment at this time. Pt v/u and in agreement with recommendations. Pt tolerated trials of regular solids (green bean)/ had to expectorate due to texture being too hard to chew without lower dentures, natural puree (soup), and thins via straw with no overt s/s of aspiration. Suspect premature spillage to pharynx and slight swallow delay due to audible swallows.    Dysphagia Outcome Severity Scale: Level 6: Within functional limits/Modified independence     Treatment Plan  Requires SLP Intervention: No             Recommended Diet and Intervention  Diet Solids Recommendation: Regular(Pt able to select soft food items she knows she can tolerate without lower dentures in place)  Liquid Consistency Recommendation: Thin  Recommended Form of Meds: Whole with water(1-2 at a time per pt request)    General  Chart Reviewed: Yes  Subjective  Subjective: Pt alert, pleasant and cooperative during swallow evaluation. Behavior/Cognition: Alert; Cooperative;Pleasant mood  Respiratory Status: Room air  Communication Observation: Functional  Follows Directions: Complex  Dentition: Edentulous; Adequate(Adequate dentition top/ edentulous bottoms)  Patient Positioning: Upright in chair  Baseline Vocal Quality: Normal  Volitional Cough: Strong  Volitional Swallow: Delayed  Prior Dysphagia History: Pt denies, reports having pna ~15 years ago but nothing recent   Consistencies Administered: Reg solid; Thin - straw;Dysphagia Pureed (Dysphagia I); Dysphagia Soft and Bite-Sized (Dysphagia III)    Vision/Hearing  Vision  Vision: Impaired  Vision Exceptions: Wears glasses at all times  Hearing  Hearing: Exceptions to [facility]  Hearing Exceptions: Hard of hearing/hearing concerns; Left hearing aid(Has Bilateral but R not present at time of evaluation)    Oral Motor Deficits  Oral/Motor  Oral Motor: Exceptions to [facility]  Lingual ROM: Reduced left; Reduced right  Lingual Coordination: Reduced    Oral Phase Dysfunction  Oral Phase  Oral Phase: Exceptions  Oral Phase Dysfunction  Suspected Premature Bolus Loss: All     Indicators of Pharyngeal Phase Dysfunction   Pharyngeal Phase  Pharyngeal Phase: Exceptions  Indicators of Pharyngeal Phase Dysfunction  Delayed Swallow: All    Education  Patient Education: Re: reason/ rationale for swallow evaluation and no further speech therapy for dysphagia treatment warranted at this time. Patient Education Response: Verbalizes understanding;Demonstrated understanding  Safety Devices in place: Yes  Type of devices: All fall risk precautions in place;Call light within reach; Patient at risk for falls; Chair alarm in

## 2020-06-22 NOTE — PLAN OF CARE
Problem: Falls - Risk of:  Goal: Will remain free from falls  Description: Will remain free from falls  Outcome: Not Met This Shift  Goal: Absence of physical injury  Description: Absence of physical injury  Outcome: Not Met This Shift     Problem: Pain:  Goal: Pain level will decrease  Description: Pain level will decrease  Outcome: Not Met This Shift  Goal: Control of acute pain  Description: Control of acute pain  Outcome: Not Met This Shift  Goal: Control of chronic pain  Description: Control of chronic pain  Outcome: Not Met This Shift     Problem: IP BLADDER/VOIDING  Goal: LTG - patient will achieve acceptable level of continence  Outcome: Not Met This Shift     Problem: IP BOWEL ELIMINATION  Goal: LTG - patient will have regular and routine bowel evacuation  Outcome: Not Met This Shift     Problem: SKIN INTEGRITY  Goal: STG - patient will maintain good skin integrity  Outcome: Not Met This Shift     Problem: Nutrition  Goal: Optimal nutrition therapy  Outcome: Not Met This Shift

## 2020-06-22 NOTE — PLAN OF CARE
Problem: Falls - Risk of:  Goal: Will remain free from falls  Description: Will remain free from falls  6/22/2020 1311 by Chely Savage RN  Outcome: Ongoing  Note: Fall risk precautions in place, call light in reach, bed in lowest position, 2/2 bed rails up, bed wheels locked, bed side table within reach, bed alarm on, will continue to monitor. Problem: Falls - Risk of:  Goal: Absence of physical injury  Description: Absence of physical injury  6/22/2020 1311 by Chely Savage RN  Outcome: Ongoing  Note: Pt is free of injury. No injury noted. Fall precautions in place. Call light within reach. Will monitor. Problem: Pain:  Goal: Pain level will decrease  Description: Pain level will decrease  6/22/2020 1311 by Chely Savage RN  Outcome: Ongoing  Note: Pt complaints of pain to the right knee due to fracture. Patient pain improving. She is being medicated appropriately according to numerical pain scale. Problem: IP BLADDER/VOIDING  Goal: LTG - patient will achieve acceptable level of continence  6/22/2020 1311 by Chely Savage RN  Outcome: Ongoing  Note: Pt is continent of urine. Problem: SKIN INTEGRITY  Goal: STG - patient will maintain good skin integrity  6/22/2020 1311 by Chely Savage RN  Outcome: Ongoing  Note: No new skin issues identified. Will continue to assess and monitor.

## 2020-06-23 LAB
GLUCOSE BLD-MCNC: 164 MG/DL (ref 70–99)
GLUCOSE BLD-MCNC: 185 MG/DL (ref 70–99)
GLUCOSE BLD-MCNC: 258 MG/DL (ref 70–99)
GLUCOSE BLD-MCNC: 96 MG/DL (ref 70–99)
PERFORMED ON: ABNORMAL
PERFORMED ON: NORMAL

## 2020-06-23 PROCEDURE — 97110 THERAPEUTIC EXERCISES: CPT

## 2020-06-23 PROCEDURE — 97535 SELF CARE MNGMENT TRAINING: CPT

## 2020-06-23 PROCEDURE — 6370000000 HC RX 637 (ALT 250 FOR IP): Performed by: PHYSICAL MEDICINE & REHABILITATION

## 2020-06-23 PROCEDURE — 1280000000 HC REHAB R&B

## 2020-06-23 PROCEDURE — 97530 THERAPEUTIC ACTIVITIES: CPT

## 2020-06-23 RX ADMIN — SENNOSIDES 17.2 MG: 8.6 TABLET, FILM COATED ORAL at 20:26

## 2020-06-23 RX ADMIN — DOCUSATE SODIUM 100 MG: 100 CAPSULE ORAL at 08:18

## 2020-06-23 RX ADMIN — LINAGLIPTIN 5 MG: 5 TABLET, FILM COATED ORAL at 08:18

## 2020-06-23 RX ADMIN — OXYCODONE 10 MG: 5 TABLET ORAL at 22:32

## 2020-06-23 RX ADMIN — INSULIN LISPRO 2 UNITS: 100 INJECTION, SOLUTION INTRAVENOUS; SUBCUTANEOUS at 20:28

## 2020-06-23 RX ADMIN — DESMOPRESSIN ACETATE 40 MG: 0.2 TABLET ORAL at 08:18

## 2020-06-23 RX ADMIN — APIXABAN 2.5 MG: 2.5 TABLET, FILM COATED ORAL at 08:18

## 2020-06-23 RX ADMIN — APIXABAN 2.5 MG: 2.5 TABLET, FILM COATED ORAL at 20:26

## 2020-06-23 RX ADMIN — SENNOSIDES 17.2 MG: 8.6 TABLET, FILM COATED ORAL at 08:18

## 2020-06-23 RX ADMIN — AMLODIPINE BESYLATE 7.5 MG: 5 TABLET ORAL at 08:18

## 2020-06-23 RX ADMIN — OXYCODONE 10 MG: 5 TABLET ORAL at 02:36

## 2020-06-23 RX ADMIN — GLIPIZIDE 5 MG: 5 TABLET ORAL at 06:27

## 2020-06-23 RX ADMIN — OXYCODONE 10 MG: 5 TABLET ORAL at 17:28

## 2020-06-23 RX ADMIN — LATANOPROST 1 DROP: 50 SOLUTION OPHTHALMIC at 20:26

## 2020-06-23 RX ADMIN — INSULIN LISPRO 1 UNITS: 100 INJECTION, SOLUTION INTRAVENOUS; SUBCUTANEOUS at 17:27

## 2020-06-23 RX ADMIN — INSULIN LISPRO 1 UNITS: 100 INJECTION, SOLUTION INTRAVENOUS; SUBCUTANEOUS at 12:13

## 2020-06-23 ASSESSMENT — PAIN DESCRIPTION - LOCATION: LOCATION: KNEE

## 2020-06-23 ASSESSMENT — PAIN SCALES - GENERAL
PAINLEVEL_OUTOF10: 3
PAINLEVEL_OUTOF10: 7
PAINLEVEL_OUTOF10: 7
PAINLEVEL_OUTOF10: 0
PAINLEVEL_OUTOF10: 8

## 2020-06-23 ASSESSMENT — PAIN DESCRIPTION - ORIENTATION: ORIENTATION: RIGHT

## 2020-06-23 ASSESSMENT — PAIN DESCRIPTION - PAIN TYPE: TYPE: ACUTE PAIN

## 2020-06-23 NOTE — PROGRESS NOTES
Dejah Lomas  6/23/2020  [id number]    Chief Complaint: Closed fracture of right tibial plateau    Subjective:   No overnight events. Patient seen this am working with PT. Still requiring cues for wheelchair management. She reports pain is controlled. She denies constipation. Sleeping well. ROS: No CP, SOB, dyspnea    Objective:  Patient Vitals for the past 24 hrs:   BP Temp Temp src Pulse Resp SpO2 Weight   06/23/20 0818 (!) 149/55 98 °F (36.7 °C) Oral 64 16 -- --   06/23/20 0553 -- -- -- -- -- -- 148 lb 9.6 oz (67.4 kg)   06/22/20 2230 (!) 157/66 97.3 °F (36.3 °C) Oral 61 18 99 % --     Gen: No distress, pleasant. HEENT: Normocephalic, atraumatic  CV: Regular rate and rhythm. No MRG   Resp: No respiratory distress. CTAB   Abd: Soft, nontender nondistended  Ext: Mild RLE edema, calf pain remains, RLE in immobilizer  Neuro: Alert, oriented, appropriately interactive. Laboratory data: Available via EMR. Therapy progress:  PT  Required Braces or Orthoses  Right Lower Extremity Brace: Knee Immobilizer  Right Upper Extremity Brace/Splint: Pt wears R UE splint for support w/WB; broke wrist 4/2019  Position Activity Restriction  Other position/activity restrictions: 59-year-old female with a history of diabetes, hyperlipidemia, hypertension, and A. fib on [facility] who was admitted on 6/10 with right knee pain after falling down a few stairs. X-ray revealed a comminuted and mildly depressed fracture involving the lateral tibial plateau as well as a proximal fracture of the fibular head. Ortho evaluated and suggested conservative management with nonweightbearing in the right lower extremity. Therapy evaluated and suggested she continue in an inpatient setting prior to returning home. She reports her pain is controlled. She lives in a quad level home with multiple half staircases.   Objective     Sit to Stand: Contact guard assistance(at times requires multiple attempts to achieve standing)  Stand to sit: Contact guard assistance(VC for eccentric control )  Bed to Chair: 2 Person Assistance(Max A x 2 )     OT  PT Equipment Recommendations  Equipment Needed: Yes  Mobility Devices: Wheelchair  Wheelchair: Light Weight  Other: The pt will need a w/c as she is NWB s/p tibial plateau fracture. She has a transport chair which she is unable to self propel  Toilet - Technique: Stand pivot  Equipment Used: Raised toilet seat with rails  Toilet Transfers Comments: cues for hand placement, use of grab bar  Assessment        SLP  Current Diet : Regular  Current Liquid Diet : Thin  Diet Solids Recommendation: Regular(Pt able to select soft food items she knows she can tolerate without lower dentures in place)  Liquid Consistency Recommendation: Thin    Body mass index is 26.32 kg/m². Assessment:  Patient Active Problem List   Diagnosis    Paroxysmal atrial fibrillation (HCC)    Closed fracture of lateral portion of right tibial plateau    Chronic anticoagulation    Closed fracture of upper end of right fibula    Fall on stairs    Closed fracture of right tibial plateau       Plan:   Right tibial plateau fracture: NWB in immobilizer ~10 weeks. Pain control. PT/OT  --f/u xrays show good alignment. Appreciate Ortho input.      Proximal right fibular fracture: as above     Afib: eliquis     HTN: norvasc increase to 7.5 -- monitor response     HLD: lipitor 40     DM: glipizide 5, tradjenta 5, SSI    Right calf pain: negative DVT US     Bowels: Per protocol  Bladder: Per protocol  Sleep: Trazodone provided prn. Pain: tylenol, braxton PRN   DVT PPx: eliquis   ELOS: 6/30 (home with family assist, [facility] PT/OT/Aide)    Interdisciplinary team conference was held today with entire rehab treatment team including PT, OT, SLP, Dietician, RN, and SW. Discussion focused on progress toward rehab goals and discharge planning. Making progress with mobility, compensatory strategies.  Separate conference then held with patient/family, questions answered and concerns addressed. Total treatment time >35 min with greater than 50% spent in care coordination. Grier Sandifer. Bebeto Miller MD 6/23/2020, 11:45 AM    * This document was created using dictation software. While all precautions were taken to ensure accuracy, errors may have occurred. Please disregard any typographical errors.

## 2020-06-23 NOTE — CARE COORDINATION
Team conference/Weekly Summary     Team conference held today. Met with pt to discuss progress with therapy, barriers to discharge, and plans to return home. Estimated discharge date set for 7/1. Will continue to follow for support and discharge planning.  Jose Eduardo García, MSW, LSW

## 2020-06-23 NOTE — PROGRESS NOTES
Occupational Therapy  Facility/Department: [facility] ACUTE REHAB UNIT  Daily Treatment Note  NAME: Celeste Rodriguez  : 1933  MRN: [id number]    Date of Service: 2020    Discharge Recommendations:  Home with Home health OT, S Level 3, Home with assist PRN  OT Equipment Recommendations  Equipment Needed: Yes  Mobility Devices: ADL Assistive Devices  ADL Assistive Devices: Transfer Tub Bench  Other: has raised toilet seat, hip kit, will likely require w/c for mobility     Assessment   Performance deficits / Impairments: Decreased functional mobility ; Decreased balance;Decreased ADL status; Decreased endurance;Decreased high-level IADLs  Assessment: Pt is not at her baseline of occupational function. Pt is progressing with therapy this date, CGA for toilet transfer with raised toilet seat and grab bars. Pt would benefit from continued intensive skilled acute OT services to addressed these deficits. Continue with POC. Treatment Diagnosis: Decreased ADL/IADL status, functional mobility, endurance and balance associated with R lateral tibia plateau fx  Prognosis: Good  Assistance / Modification: CGA for functional transfers, Min A for LB clothing mgmt/toileting  OT Education: OT Role;Energy Conservation;Transfer Training  Patient Education: pt demonstrated understanding  REQUIRES OT FOLLOW UP: Yes  Activity Tolerance  Activity Tolerance: Patient Tolerated treatment well  Safety Devices  Safety Devices in place: Yes  Type of devices: Left in chair;Call light within reach; Chair alarm in place; All fall risk precautions in place;Nurse notified         Patient Diagnosis(es): Tib Fx     has a past medical history of Arthritis, Diabetes mellitus ([facility] [address] Hyperlipidemia, Hypertension, and Paroxysmal atrial fibrillation ([facility] [address] has a past surgical history that includes Cholecystectomy ().     Restrictions  Restrictions/Precautions  Restrictions/Precautions: Weight Bearing  Required Braces or Orthoses?: Yes  Lower Extremity Weight Bearing Restrictions  Right Lower Extremity Weight Bearing: Non Weight Bearing  Required Braces or Orthoses  Right Lower Extremity Brace: Knee Immobilizer  Right Upper Extremity Brace/Splint: Pt wears R UE splint for support w/WB; broke wrist 4/2019  Position Activity Restriction  Other position/activity restrictions: 44-year-old female with a history of diabetes, hyperlipidemia, hypertension, and A. fib on Eliquis who was admitted on 6/10 with right knee pain after falling down a few stairs. X-ray revealed a comminuted and mildly depressed fracture involving the lateral tibial plateau as well as a proximal fracture of the fibular head. Ortho evaluated and suggested conservative management with nonweightbearing in the right lower extremity. Therapy evaluated and suggested she continue in an inpatient setting prior to returning home. She reports her pain is controlled. She lives in a quad level home with multiple half staircases. Subjective   General  Chart Reviewed: Yes  Patient assessed for rehabilitation services?: Yes  Response to previous treatment: Patient with no complaints from previous session  Family / Caregiver Present: No  Referring Practitioner: Anjali Lake DO, for d/c planning  Diagnosis: R lateral tibia plateau fx  Subjective  Subjective: Pt supine in bed at entry with [address] donned, pleasant and agreeable to therapy session. General Comment  Comments: Pt supine to sit Supervision. Pt sit to stand/stand pivot/stand to sit CGA EOB to wc. Pt in bathroom, stand pivot toilet transfer (CGA/Min A), pt toileted (Min A to pull up shorts). Pt dressed UB (setup) and LB (setup/Min A to pull up shorts). Pt seated at sink for oral care/comb hair/wash face (Independent). BSC placed over toilet for raised toilet seat, pt with dry transfer CGA. Pt in room gathering clothing with reacher, putting clothing away with reacher in closet and in dirty laundry bag - pt required Min A to maneuver wc in tight spaces. Pt transfer CGA to recliner and pt served breakfast. While pt ate at [address] pt educated on energy conservation techniques for home d/c - pt verbalized understanding. Call light in reach and chair alarm on. Vital Signs  Patient Currently in Pain: Denies     Orientation  Orientation  Overall Orientation Status: Within Normal Limits    Objective    ADL  Grooming: Independent  UE Dressing: Setup  LE Dressing: Minimal assistance;Setup; Increased time to complete  Toileting: Minimal assistance(to pull up shorts)     Balance  Sitting Balance: Modified independent   Standing Balance: Contact guard assistance  Standing Balance  Time: ~< 2 min  Activity: toilet transfer x 2, LB clothing mgmt  Functional Mobility  Functional - Mobility Device: Wheelchair  Activity: To/from bathroom;Transport items  Functional Mobility Comments: Min A to maneuver in tight spaces  Toilet Transfers  Toilet - Technique: Stand pivot  Equipment Used: Raised toilet seat with rails  Toilet Transfer: Contact guard assistance  Bed mobility  Supine to Sit: Supervision  Scooting: Supervision  Transfers  Stand Pivot Transfers: Contact guard assistance  Sit to stand: Contact guard assistance  Stand to sit: Contact guard assistance      Cognition  Overall Cognitive Status: [facility]         ADDENDUM [id number]  Pt seated in recliner, reports no pain and agreeable to therapy session (requesting to toilet). Pt stand pivot CGA recliner to . Pt Mod I wc mobility to bathroom ~12 ft. Pt toilet transfer CGA and pt toileted Min A to pull up shorts. Pt at sink in wc washed hands. Pt wc mobility Mod I to therapy gym ~123 ft. In therapy gym pt with dynamic standing balance activity (word applele) in stance at [place] performed at [facility] for 2 min 11 sec. Pt wc mobility back to room ~150 ft at Mode I with 1 rest break due to fatigue. Back in the room pt CGA for stand pivot transfer wc to recliner.  Seated in recliner pt actively participated in the following to address VICKY  strength:  3lb digi flex 20 reps x 3 sets and 9lb digi flex 10 reps x 3 sets, with thera putty pt with squeezing, rolling, pinching activities. Call light in reach and chair alarm on.       Plan   Plan  Times per week: 90 minutes 5 days per week   Times per day: Daily  Specific instructions for Next Treatment:    Current Treatment Recommendations: Functional Mobility Training, Safety Education & Training, Balance Training, Self-Care / ADL, Endurance Training, Wheelchair Mobility Training, Patient/Caregiver Education & Training    Goals  Short term goals  Time Frame for Short term goals: 3 weeks   Short term goal 1: Mod I functional transfers to toilet and shower   Short term goal 2: mod I UB bathing/dressing  Short term goal 3: min A LB bathing/dressing - goal met bathing 6/22  Short term goal 4: min A toileting - goal met 6/22  Short term goal 5: mod I functional mobility from w/c level for ADLs/IADLs  Patient Goals   Patient goals : \"to return home\"        Therapy Time   Individual Concurrent Group Co-treatment   Time In 0730         Time Out 0815         Minutes 45         Timed Code Treatment Minutes: 45 Minutes      Therapy Time   Individual Concurrent Group Co-treatment   Time In 0768         Time Out 1115         Minutes 45         Timed Code Treatment Minutes: 45 Minutes    Total Minutes [address] [facility], [address]  Cosign: Rei Morton OTR/L, [address] [facility] #[id number]

## 2020-06-23 NOTE — PLAN OF CARE
Problem: Falls - Risk of:  Goal: Will remain free from falls  Description: Will remain free from falls  Outcome: Ongoing     Problem: Pain:  Goal: Pain level will decrease  Description: Pain level will decrease  Outcome: Ongoing     Problem: Falls - Risk of:  Goal: Absence of physical injury  Description: Absence of physical injury  Outcome: Ongoing     Problem: Pain:  Goal: Control of acute pain  Description: Control of acute pain  Outcome: Ongoing     Problem: Pain:  Goal: Control of chronic pain  Description: Control of chronic pain  Outcome: Ongoing     Problem: IP BLADDER/VOIDING  Goal: LTG - patient will achieve acceptable level of continence  Outcome: Ongoing     Problem: IP BOWEL ELIMINATION  Goal: LTG - patient will have regular and routine bowel evacuation  Outcome: Ongoing     Problem: SKIN INTEGRITY  Goal: STG - patient will maintain good skin integrity  Outcome: Ongoing     Problem: Nutrition  Goal: Optimal nutrition therapy  Outcome: Ongoing

## 2020-06-23 NOTE — PROGRESS NOTES
Extremity Brace/Splint: Pt wears R UE splint for support w/WB; broke wrist 4/2019  Position Activity Restriction  Other position/activity restrictions: 40-year-old female with a history of diabetes, hyperlipidemia, hypertension, and A. fib on Eliquis who was admitted on 6/10 with right knee pain after falling down a few stairs. X-ray revealed a comminuted and mildly depressed fracture involving the lateral tibial plateau as well as a proximal fracture of the fibular head. Ortho evaluated and suggested conservative management with nonweightbearing in the right lower extremity. Therapy evaluated and suggested she continue in an inpatient setting prior to returning home. She reports her pain is controlled. She lives in a quad level home with multiple half staircases. Subjective   General  Chart Reviewed: Yes  Family / Caregiver Present: No  Referring Practitioner: Dr. Steffen Zaman  Subjective  Subjective: Patient denies pain, agreeable to therapy session. General Comment  Comments: Patient seated in recliner upon arrival.    Pain Screening  Patient Currently in Pain: Denies  Vital Signs  Patient Currently in Pain: Denies       Orientation  Orientation  Overall Orientation Status: Within Normal Limits    Cognition   Cognition  Overall Cognitive Status: WFL    Objective   Transfers  Sit to Stand: Contact guard assistance(at times requires multiple attempts to achieve standing)  Stand to sit: Contact guard assistance(VC for eccentric control )  Stand Pivot Transfers: Contact guard assistance(VC for hand placement recliner <=> w/c and w/c <=> seated stepper)  Comment: toilet transfer with use of grab bar: CGA (A) of 1. Requires additional assistance for LB clothing management. Pt self maintains restricted WB status during all transfers with VC.     Ambulation  Ambulation?: No  Wheelchair Activities  Wheelchair Parts Management: Yes  Right Leg Rest Level of Assistance: Supervision(x 3 completions)  Left Brakes Level of Up and down 4 steps backward with Min A   Patient Goals   Patient goals : Improve mobility    Plan    Plan  Times per week: 90 minutes/day, 5 days/wk  Times per day: Daily  Current Treatment Recommendations: ROM, Balance Training, Functional Mobility Training, Transfer Training, Neuromuscular Re-education, Endurance Training, Safety Education & Training, Gait Training, Equipment Evaluation, Education, & procurement, Patient/Caregiver Education & Training, Stair training, Home Exercise Program, ADL/Self-care Training, Strengthening, Pain Management, Modalities  Safety Devices  Type of devices:  All fall risk precautions in place, Call light within reach, Left in chair, Chair alarm in place, Gait belt  Restraints  Initially in place: No     Therapy Time   Individual Concurrent Group Co-treatment   Time In 0845         Time Out 0930         Minutes 45         Timed Code Treatment Minutes: [address] Time:   Individual Concurrent Group Co-treatment   Time In 1245         Time Out 1330         Minutes 45           Timed Code Treatment Minutes:  45 + 45     Total Treatment Minutes:  90 minutes    Marla Woods PT, DPT - [id number]

## 2020-06-24 LAB
GLUCOSE BLD-MCNC: 123 MG/DL (ref 70–99)
GLUCOSE BLD-MCNC: 129 MG/DL (ref 70–99)
GLUCOSE BLD-MCNC: 261 MG/DL (ref 70–99)
GLUCOSE BLD-MCNC: 266 MG/DL (ref 70–99)
PERFORMED ON: ABNORMAL

## 2020-06-24 PROCEDURE — 6370000000 HC RX 637 (ALT 250 FOR IP): Performed by: PHYSICAL MEDICINE & REHABILITATION

## 2020-06-24 PROCEDURE — 94760 N-INVAS EAR/PLS OXIMETRY 1: CPT

## 2020-06-24 PROCEDURE — 97535 SELF CARE MNGMENT TRAINING: CPT

## 2020-06-24 PROCEDURE — 97110 THERAPEUTIC EXERCISES: CPT

## 2020-06-24 PROCEDURE — 1280000000 HC REHAB R&B

## 2020-06-24 PROCEDURE — 97530 THERAPEUTIC ACTIVITIES: CPT

## 2020-06-24 RX ADMIN — APIXABAN 2.5 MG: 2.5 TABLET, FILM COATED ORAL at 08:58

## 2020-06-24 RX ADMIN — OXYCODONE 10 MG: 5 TABLET ORAL at 10:20

## 2020-06-24 RX ADMIN — OXYCODONE 10 MG: 5 TABLET ORAL at 20:59

## 2020-06-24 RX ADMIN — DOCUSATE SODIUM 100 MG: 100 CAPSULE ORAL at 08:56

## 2020-06-24 RX ADMIN — INSULIN LISPRO 2 UNITS: 100 INJECTION, SOLUTION INTRAVENOUS; SUBCUTANEOUS at 21:01

## 2020-06-24 RX ADMIN — LATANOPROST 1 DROP: 50 SOLUTION OPHTHALMIC at 20:58

## 2020-06-24 RX ADMIN — APIXABAN 2.5 MG: 2.5 TABLET, FILM COATED ORAL at 20:59

## 2020-06-24 RX ADMIN — LINAGLIPTIN 5 MG: 5 TABLET, FILM COATED ORAL at 08:56

## 2020-06-24 RX ADMIN — INSULIN LISPRO 3 UNITS: 100 INJECTION, SOLUTION INTRAVENOUS; SUBCUTANEOUS at 12:31

## 2020-06-24 RX ADMIN — GLIPIZIDE 5 MG: 5 TABLET ORAL at 06:39

## 2020-06-24 RX ADMIN — AMLODIPINE BESYLATE 7.5 MG: 5 TABLET ORAL at 08:56

## 2020-06-24 RX ADMIN — SENNOSIDES 17.2 MG: 8.6 TABLET, FILM COATED ORAL at 08:58

## 2020-06-24 RX ADMIN — DESMOPRESSIN ACETATE 40 MG: 0.2 TABLET ORAL at 08:58

## 2020-06-24 RX ADMIN — SENNOSIDES 17.2 MG: 8.6 TABLET, FILM COATED ORAL at 20:59

## 2020-06-24 ASSESSMENT — PAIN SCALES - GENERAL
PAINLEVEL_OUTOF10: 0
PAINLEVEL_OUTOF10: 4
PAINLEVEL_OUTOF10: 6

## 2020-06-24 ASSESSMENT — PAIN DESCRIPTION - PAIN TYPE: TYPE: ACUTE PAIN

## 2020-06-24 ASSESSMENT — PAIN DESCRIPTION - ORIENTATION: ORIENTATION: RIGHT

## 2020-06-24 ASSESSMENT — PAIN DESCRIPTION - LOCATION: LOCATION: KNEE

## 2020-06-24 NOTE — PROGRESS NOTES
Physical Therapy  Facility/Department: [facility] ACUTE REHAB UNIT  Daily Treatment Note  NAME: Quin Bautista  : 1933  MRN: [id number]    Date of Service: 2020    Discharge Recommendations:  Home with Home health PT, 24 hour supervision or assist, S Level 3   PT Equipment Recommendations  Equipment Needed: Yes  Mobility Devices: Wheelchair  Wheelchair: Light Weight  Other: 18\" manual w/c with cushion and elevating leg rests    Assessment   Body structures, Functions, Activity limitations: Decreased functional mobility ; Decreased ROM; Decreased strength;Decreased balance;Decreased endurance; Increased pain;Decreased ADL status; Decreased safe awareness  Assessment: Patients efficiency and stability during functional transfers continues to improve in response to therapy services. Patient self completing increased w/c based mobility and management but continues to require VC for proper transfer set up. Pt will benefit from continued skilled therapy services to improve functional mobility level. Treatment Diagnosis: Impaired balance, impaired functional mobility  Prognosis: Good  PT Education: Goals;Transfer Training;Functional Mobility Training;General Safety; Energy Conservation  Patient Education: w/c part management. w/c based transfer set up.  pt verbalized understanding, will continue to reinforce  Barriers to Learning: hearing  REQUIRES PT FOLLOW UP: Yes  Activity Tolerance  Activity Tolerance: Patient limited by fatigue  Activity Tolerance: Presenting with increased fatigue on this date resulting in need for increased rest breaks within session. Patient Diagnosis(es): (R) tibial plateau fracture     has a past medical history of Arthritis, Diabetes mellitus ([facility] [address] Hyperlipidemia, Hypertension, and Paroxysmal atrial fibrillation ([facility] [address] has a past surgical history that includes Cholecystectomy ().     Restrictions  Restrictions/Precautions  Restrictions/Precautions: Weight Bearing  Required Braces or Orthoses?: Yes  Lower Extremity Weight Bearing Restrictions  Right Lower Extremity Weight Bearing: Non Weight Bearing  Required Braces or Orthoses  Right Lower Extremity Brace: Knee Immobilizer  Right Upper Extremity Brace/Splint: Pt wears R UE splint for support w/WB; broke wrist 4/2019  Position Activity Restriction  Other position/activity restrictions: 59-year-old female with a history of diabetes, hyperlipidemia, hypertension, and A. fib on Eliquis who was admitted on 6/10 with right knee pain after falling down a few stairs. X-ray revealed a comminuted and mildly depressed fracture involving the lateral tibial plateau as well as a proximal fracture of the fibular head. Ortho evaluated and suggested conservative management with nonweightbearing in the right lower extremity. Therapy evaluated and suggested she continue in an inpatient setting prior to returning home. She reports her pain is controlled. She lives in a quad level home with multiple half staircases. Subjective   General  Chart Reviewed: Yes  Family / Caregiver Present: No  Referring Practitioner: Dr. Camryn Camarena  Subjective  Subjective: Patient denies pain, agreeable to therapy session. General Comment  Comments: Patient in w/c upon arrival.         Orientation  Orientation  Overall Orientation Status: Within Normal Limits    Cognition   Cognition  Overall Cognitive Status: WFL    Objective   Transfers  Sit to Stand: Contact guard assistance(Frequently requires multiple attempts to achieve standing)  Stand to sit: Contact guard assistance(VC for controlled lowering to chair surface)  Stand Pivot Transfers: Contact guard assistance  Comment: toilet transfer with use of grab bar: CGA (A) of 1. Requires additional assistance for LB clothing management. Pt self maintains restricted WB status during all transfers with VC.     Ambulation  Ambulation?: No  Wheelchair Activities  Wheelchair Parts Management: Yes  Right Leg Rest Level of I  Long term goal 2: Sit to/from stand with Mod I  Long term goal 3: Bed to/from w/c with Supervision  Long term goal 4: Amb. x 25' with FWW and Min A  Long term goal 5: [employer]el w/c x 150' with Mod I  Long term goal 6: Car transfer with [address] term goal 7: Up and down 4 steps backward with Min A   Patient Goals   Patient goals : Improve mobility    Plan    Plan  Times per week: 90 minutes/day, 5 days/wk  Times per day: Daily  Current Treatment Recommendations: ROM, Balance Training, Functional Mobility Training, Transfer Training, Neuromuscular Re-education, Endurance Training, Safety Education & Training, Gait Training, Equipment Evaluation, Education, & procurement, Patient/Caregiver Education & Training, Stair training, Home Exercise Program, ADL/Self-care Training, Strengthening, Pain Management, Modalities  Safety Devices  Type of devices:  All fall risk precautions in place, Call light within reach, Left in chair, Chair alarm in place, Gait belt  Restraints  Initially in place: No     Therapy Time   Individual Concurrent Group Co-treatment   Time In 0918         Time Out 1015         Minutes 57         Timed Code Treatment Minutes: [address] [facility] Time:   Individual Concurrent Group Co-treatment   Time In 1246         Time Out 1328         Minutes 42           Timed Code Treatment Minutes:  57 + 42    Total Treatment Minutes: 99 minutes    Jennetta Bamberger PT, DPT - [id number]

## 2020-06-24 NOTE — PROGRESS NOTES
Dejah Lomas  6/24/2020  [id number]    Chief Complaint: Closed fracture of right tibial plateau    Subjective:   No overnight events. Patient seen this afternoon sitting up in room. She reports therapy continues to go well. Pain is controlled. No new concerns. ROS: No CP, SOB, dyspnea    Objective:  Patient Vitals for the past 24 hrs:   BP Temp Temp src Pulse Resp SpO2 Weight   06/24/20 0845 (!) 138/44 98 °F (36.7 °C) -- 66 16 97 % --   06/24/20 0600 -- -- -- -- -- -- 147 lb 8 oz (66.9 kg)   06/23/20 2015 (!) 165/69 97.3 °F (36.3 °C) Oral 66 16 98 % --     Gen: No distress, pleasant. HEENT: Normocephalic, atraumatic  CV: Regular rate and rhythm. No MRG   Resp: No respiratory distress. CTAB   Abd: Soft, nontender nondistended  Ext: Mild RLE edema, calf pain remains, RLE in immobilizer  Neuro: Alert, oriented, appropriately interactive. Laboratory data: Available via EMR. Therapy progress:  PT  Required Braces or Orthoses  Right Lower Extremity Brace: Knee Immobilizer  Right Upper Extremity Brace/Splint: Pt wears R UE splint for support w/WB; broke wrist 4/2019  Position Activity Restriction  Other position/activity restrictions: 49-year-old female with a history of diabetes, hyperlipidemia, hypertension, and A. fib on [facility] who was admitted on 6/10 with right knee pain after falling down a few stairs. X-ray revealed a comminuted and mildly depressed fracture involving the lateral tibial plateau as well as a proximal fracture of the fibular head. Ortho evaluated and suggested conservative management with nonweightbearing in the right lower extremity. Therapy evaluated and suggested she continue in an inpatient setting prior to returning home. She reports her pain is controlled. She lives in a quad level home with multiple half staircases.   Objective     Sit to Stand: Contact guard assistance(Frequently requires multiple attempts to achieve standing)  Stand to sit: Contact guard assistance(VC

## 2020-06-24 NOTE — PLAN OF CARE
Problem: Falls - Risk of:  Goal: Will remain free from falls  Description: Will remain free from falls  Outcome: Ongoing     Problem: Pain:  Description: Pain management should include both nonpharmacologic and pharmacologic interventions.   Goal: Pain level will decrease  Description: Pain level will decrease  Outcome: Ongoing     Problem: IP BLADDER/VOIDING  Goal: LTG - patient will achieve acceptable level of continence  Outcome: Ongoing     Problem: IP BOWEL ELIMINATION  Goal: LTG - patient will have regular and routine bowel evacuation  Outcome: Ongoing     Problem: SKIN INTEGRITY  Goal: STG - patient will maintain good skin integrity  Outcome: Ongoing     Problem: Nutrition  Goal: Optimal nutrition therapy  Outcome: Ongoing

## 2020-06-24 NOTE — PROGRESS NOTES
Nutrition Assessment (Low Risk)    Type and Reason for Visit: Reassess    Nutrition Recommendations:   No nutrition rec's    Nutrition Assessment:   Pt remains nutritionally stable AEB report of good appetite with po intake greater than 50% of meals. Pt expresses no nutrition concerns @ this time. Will con't to monitor progress.     Malnutrition Assessment:  · Malnutrition Status: No malnutrition    Nutrition Risk Level   Risk Level: Low    Nutrition Diagnosis:   · Problem: No nutrition diagnosis at this time    Nutrition Intervention:  Food and/or Delivery: Continue current diet  Nutrition Education/Counseling/Coordination of Care:  Continued Inpatient Monitoring, Education Not Indicated      Electronically signed by Mariama Ogden RD, LD on 6/24/20 at 1:45 PM EDT    Contact Number: [phone]

## 2020-06-24 NOTE — PLAN OF CARE
Problem: Falls - Risk of:  Goal: Will remain free from falls  Description: Will remain free from falls  6/24/2020 0035 by Lemuel Carrillo RN  Outcome: Ongoing     Problem: Falls - Risk of:  Goal: Absence of physical injury  Description: Absence of physical injury  6/24/2020 0035 by Lemuel Carrillo RN  Outcome: Ongoing     Problem: Pain:  Goal: Control of acute pain  Description: Control of acute pain  6/24/2020 0035 by Lemuel Carrillo RN  Outcome: Ongoing     Problem: IP BLADDER/VOIDING  Goal: LTG - patient will achieve acceptable level of continence  6/24/2020 0035 by Lemuel Carrillo RN  Outcome: Ongoing     Problem: SKIN INTEGRITY  Goal: STG - patient will maintain good skin integrity  6/24/2020 0035 by Lemuel Carrillo RN  Outcome: Ongoing

## 2020-06-24 NOTE — PROGRESS NOTES
revealed a comminuted and mildly depressed fracture involving the lateral tibial plateau as well as a proximal fracture of the fibular head. Ortho evaluated and suggested conservative management with nonweightbearing in the right lower extremity. Therapy evaluated and suggested she continue in an inpatient setting prior to returning home. She reports her pain is controlled. She lives in a quad level home with multiple half staircases. Subjective   General  Chart Reviewed: Yes  Patient assessed for rehabilitation services?: Yes  Response to previous treatment: Patient with no complaints from previous session  Family / Caregiver Present: No  Referring Practitioner: Babs He DO, for d/c planning  Diagnosis: R lateral tibia plateau fx  Subjective  Subjective: Pt supine in bed at entry with [address] donned, pleasant and agreeable to therapy session/ADL showers  General Comment  Comments: Pt supine to sit Supervision. Pt sit to stand/stand pivot/stand to sit CGA EOB to . Pt in bathroom, stand pivot toilet transfer CGA (pt pull off brief/hygiene care) at [facility]. Pt stand pivot shower transfer CGA. Pt bathed UB (setup) and LB (CGA in stance to dry buttocks). Pt dressed UB - bra/shirt (setup) and LB - sock/shorts/brief (setup/Min A to pull up shorts. Pt seated in  at sink groomed (oral care/hair) at [facility]. Pt seated in  took gown and transported  to clolset Mod I. Pt stand pivot CGA wc to recliner. Pt served breakfast and ate at [address]. Vital Signs  Patient Currently in Pain: Denies   Orientation  Orientation  Overall Orientation Status: Within Normal Limits  Objective    ADL  Feeding: Modified independent (lower dentures)  Grooming: Independent  UE Bathing: Setup  LE Bathing: Contact guard assistance;Setup  UE Dressing: Setup  LE Dressing: Minimal assistance;Setup; Increased time to complete        Balance  Sitting Balance: Modified independent   Standing Balance: Contact guard assistance  Standing Balance  Time: ~< 3 min total  Activity: toilet transfer, LB clothing mgmt, shower transfer  Functional Mobility  Functional - Mobility Device: Wheelchair  Activity: To/from bathroom;Transport items  Assist Level: Modified independent   Toilet Transfers  Toilet - Technique: Stand pivot  Equipment Used: Raised toilet seat with rails  Toilet Transfer: Contact guard assistance  Shower Transfers  Shower - Transfer From: Wheelchair  Shower - Transfer Type: To and From  Shower - Transfer To: Shower seat with back  Shower - Technique: Stand pivot  Shower Transfers: Contact Guard  Bed mobility  Supine to Sit: Supervision  Scooting: Supervision  Transfers  Sit to stand: Contact guard assistance  Stand to sit: Contact guard assistance      Cognition  Overall Cognitive Status: [facility]          ADDENDUM [id number]    Pt seated in recliner upon entry, pleasant reports no pain and agreeable to therapy session. Pt stand pivot transfer CGA to . Pt in [facility] I to activity room ~100 ft. Pt in activity room with reacher retrieved items from closet and refrigerator at [facility]. Pt wc mobility to therapy gym. In therapy gym pt actively participated in dynamic standing balance activity (word scramble) at [facility] x 2 trials (1 min 39 sec and 1 min 56 sec). Pt  mobility [facility] [place] [facility] I back to room. In room pt CGA stand pivot to recliner. Call light in reach and chair alarm on.         Plan   Plan  Times per week: 90 minutes 5 days per week   Times per day: Daily  Specific instructions for Next Treatment:    Current Treatment Recommendations: Functional Mobility Training, Safety Education & Training, Balance Training, Self-Care / ADL, Endurance Training, Wheelchair Mobility Training, Patient/Caregiver Education & Training    Goals  Short term goals  Time Frame for Short term goals: 3 weeks   Short term goal 1: Mod I functional transfers to toilet and shower   Short term goal 2: mod I UB bathing/dressing  Short term goal 3: min A LB bathing/dressing - goal met bathing 6/22  Short term goal 4: min A toileting - goal met 6/22  Short term goal 5: mod I functional mobility from w/c level for ADLs/IADLs - goal met 6/24  Patient Goals   Patient goals : \"to return home\"        Therapy Time   Individual Concurrent Group Co-treatment   Time In 0730         Time Out 0830         Minutes 60         Timed Code Treatment Minutes: 60 Minutes     Therapy Time   Individual Concurrent Group Co-treatment   Time In 1030         Time Out 1100         Minutes 30         Timed Code Treatment Minutes: 30 Minutes    Total Minutes: 1400 [facility], [facility]   Cosign: Shayla Brandon OTR/L, 116 [facility] #[id number]

## 2020-06-25 LAB
ANION GAP SERPL CALCULATED.3IONS-SCNC: 8 MMOL/L (ref 3–16)
BUN BLDV-MCNC: 22 MG/DL (ref 7–20)
CALCIUM SERPL-MCNC: 8.6 MG/DL (ref 8.3–10.6)
CHLORIDE BLD-SCNC: 107 MMOL/L (ref 99–110)
CO2: 21 MMOL/L (ref 21–32)
CREAT SERPL-MCNC: 1.5 MG/DL (ref 0.6–1.2)
GFR AFRICAN AMERICAN: 40
GFR NON-AFRICAN AMERICAN: 33
GLUCOSE BLD-MCNC: 113 MG/DL (ref 70–99)
GLUCOSE BLD-MCNC: 124 MG/DL (ref 70–99)
GLUCOSE BLD-MCNC: 173 MG/DL (ref 70–99)
GLUCOSE BLD-MCNC: 221 MG/DL (ref 70–99)
GLUCOSE BLD-MCNC: 239 MG/DL (ref 70–99)
HCT VFR BLD CALC: 26.8 % (ref 36–48)
HEMOGLOBIN: 8.6 G/DL (ref 12–16)
MCH RBC QN AUTO: 25.1 PG (ref 26–34)
MCHC RBC AUTO-ENTMCNC: 32.1 G/DL (ref 31–36)
MCV RBC AUTO: 78 FL (ref 80–100)
PDW BLD-RTO: 17.3 % (ref 12.4–15.4)
PERFORMED ON: ABNORMAL
PLATELET # BLD: 336 K/UL (ref 135–450)
PMV BLD AUTO: 7.9 FL (ref 5–10.5)
POTASSIUM SERPL-SCNC: 4.7 MMOL/L (ref 3.5–5.1)
RBC # BLD: 3.44 M/UL (ref 4–5.2)
SODIUM BLD-SCNC: 136 MMOL/L (ref 136–145)
WBC # BLD: 7 K/UL (ref 4–11)

## 2020-06-25 PROCEDURE — 97110 THERAPEUTIC EXERCISES: CPT

## 2020-06-25 PROCEDURE — 1280000000 HC REHAB R&B

## 2020-06-25 PROCEDURE — 97535 SELF CARE MNGMENT TRAINING: CPT

## 2020-06-25 PROCEDURE — 6370000000 HC RX 637 (ALT 250 FOR IP): Performed by: PHYSICAL MEDICINE & REHABILITATION

## 2020-06-25 PROCEDURE — 80048 BASIC METABOLIC PNL TOTAL CA: CPT

## 2020-06-25 PROCEDURE — 97530 THERAPEUTIC ACTIVITIES: CPT

## 2020-06-25 PROCEDURE — 36415 COLL VENOUS BLD VENIPUNCTURE: CPT

## 2020-06-25 PROCEDURE — 85027 COMPLETE CBC AUTOMATED: CPT

## 2020-06-25 RX ORDER — SODIUM CHLORIDE 9 MG/ML
INJECTION, SOLUTION INTRAVENOUS ONCE
Status: DISCONTINUED | OUTPATIENT
Start: 2020-06-25 | End: 2020-06-25

## 2020-06-25 RX ADMIN — INSULIN LISPRO 1 UNITS: 100 INJECTION, SOLUTION INTRAVENOUS; SUBCUTANEOUS at 21:03

## 2020-06-25 RX ADMIN — DESMOPRESSIN ACETATE 40 MG: 0.2 TABLET ORAL at 07:56

## 2020-06-25 RX ADMIN — INSULIN LISPRO 2 UNITS: 100 INJECTION, SOLUTION INTRAVENOUS; SUBCUTANEOUS at 11:41

## 2020-06-25 RX ADMIN — OXYCODONE 5 MG: 5 TABLET ORAL at 20:57

## 2020-06-25 RX ADMIN — LINAGLIPTIN 5 MG: 5 TABLET, FILM COATED ORAL at 07:56

## 2020-06-25 RX ADMIN — DOCUSATE SODIUM 100 MG: 100 CAPSULE ORAL at 07:56

## 2020-06-25 RX ADMIN — HYDRALAZINE HYDROCHLORIDE 50 MG: 25 TABLET, FILM COATED ORAL at 20:57

## 2020-06-25 RX ADMIN — LATANOPROST 1 DROP: 50 SOLUTION OPHTHALMIC at 20:57

## 2020-06-25 RX ADMIN — INSULIN LISPRO 1 UNITS: 100 INJECTION, SOLUTION INTRAVENOUS; SUBCUTANEOUS at 16:50

## 2020-06-25 RX ADMIN — GLIPIZIDE 5 MG: 5 TABLET ORAL at 06:29

## 2020-06-25 RX ADMIN — OXYCODONE 10 MG: 5 TABLET ORAL at 15:17

## 2020-06-25 RX ADMIN — AMLODIPINE BESYLATE 7.5 MG: 5 TABLET ORAL at 07:56

## 2020-06-25 RX ADMIN — SENNOSIDES 17.2 MG: 8.6 TABLET, FILM COATED ORAL at 07:56

## 2020-06-25 RX ADMIN — SENNOSIDES 17.2 MG: 8.6 TABLET, FILM COATED ORAL at 20:57

## 2020-06-25 RX ADMIN — APIXABAN 2.5 MG: 2.5 TABLET, FILM COATED ORAL at 07:57

## 2020-06-25 RX ADMIN — APIXABAN 2.5 MG: 2.5 TABLET, FILM COATED ORAL at 20:57

## 2020-06-25 ASSESSMENT — PAIN DESCRIPTION - LOCATION: LOCATION: KNEE

## 2020-06-25 ASSESSMENT — PAIN SCALES - GENERAL
PAINLEVEL_OUTOF10: 3
PAINLEVEL_OUTOF10: 0
PAINLEVEL_OUTOF10: 5
PAINLEVEL_OUTOF10: 7

## 2020-06-25 ASSESSMENT — PAIN DESCRIPTION - ORIENTATION: ORIENTATION: RIGHT

## 2020-06-25 ASSESSMENT — PAIN DESCRIPTION - PAIN TYPE: TYPE: ACUTE PAIN

## 2020-06-25 NOTE — PLAN OF CARE
Problem: Falls - Risk of:  Goal: Will remain free from falls  Description: Will remain free from falls  6/25/2020 0143 by Cecy Willoughby RN  Outcome: Ongoing     Problem: Falls - Risk of:  Goal: Absence of physical injury  Description: Absence of physical injury  Outcome: Ongoing     Problem: Pain:  Goal: Pain level will decrease  Description: Pain level will decrease  6/25/2020 0143 by Cecy Willoughby RN  Outcome: Ongoing     Problem: Pain:  Goal: Control of acute pain  Description: Control of acute pain  Outcome: Ongoing     Problem: SKIN INTEGRITY  Goal: STG - patient will maintain good skin integrity  6/25/2020 0143 by Cecy Willoughby RN  Outcome: Ongoing

## 2020-06-25 NOTE — PROGRESS NOTES
Occupational Therapy  Facility/Department: [facility] ACUTE REHAB UNIT  Daily Treatment Note  NAME: Nisha Oneal  : 1933  MRN: [id number]    Date of Service: 2020    Discharge Recommendations:  Home with Home health OT, S Level 3, Home with assist PRN  OT Equipment Recommendations  Equipment Needed: Yes  Mobility Devices: ADL Assistive Devices  ADL Assistive Devices: Transfer Tub Bench  Other: has raised toilet seat, hip kit, will likely require w/c for mobility     Assessment   Performance deficits / Impairments: Decreased functional mobility ; Decreased balance;Decreased ADL status; Decreased endurance;Decreased high-level IADLs  Assessment: Pt is not at her baseline of occupational function. Pt is progressing with therapy this date, CGA for toilet transfer with raised toilet seat and grab bars. Pt requirs Min A for LB dressing to pull up shorts and plans at d/c home to wear moo-moo and brief for eaier LB dressing. Pt would benefit from continued intensive skilled acute OT services to addressed these deficits. Continue with POC. Treatment Diagnosis: Decreased ADL/IADL status, functional mobility, endurance and balance associated with R lateral tibia plateau fx  Prognosis: Good  Exam: ADLs and transfers  Assistance / Modification: CGA for functional transfers, Min A for LB clothing mgmt/toileting  OT Education: OT Role;Transfer Training  Patient Education: pt demonstrated understanding  REQUIRES OT FOLLOW UP: Yes  Activity Tolerance  Activity Tolerance: Patient Tolerated treatment well  Safety Devices  Safety Devices in place: Yes  Type of devices: Left in chair;Call light within reach; Chair alarm in place; All fall risk precautions in place;Nurse notified         Patient Diagnosis(es): Tib Fx     has a past medical history of Arthritis, Diabetes mellitus ([facility] [address] Hyperlipidemia, Hypertension, and Paroxysmal atrial fibrillation ([facility] [address]    has a past surgical history that includes Cholecystectomy (1990). Restrictions  Restrictions/Precautions  Restrictions/Precautions: Weight Bearing  Required Braces or Orthoses?: Yes  Lower Extremity Weight Bearing Restrictions  Right Lower Extremity Weight Bearing: Non Weight Bearing  Required Braces or Orthoses  Right Lower Extremity Brace: Knee Immobilizer  Right Upper Extremity Brace/Splint: Pt wears R UE splint for support w/WB; broke wrist 4/2019  Position Activity Restriction  Other position/activity restrictions: 26-year-old female with a history of diabetes, hyperlipidemia, hypertension, and A. fib on Eliquis who was admitted on 6/10 with right knee pain after falling down a few stairs. X-ray revealed a comminuted and mildly depressed fracture involving the lateral tibial plateau as well as a proximal fracture of the fibular head. Ortho evaluated and suggested conservative management with nonweightbearing in the right lower extremity. Therapy evaluated and suggested she continue in an inpatient setting prior to returning home. She reports her pain is controlled. She lives in a quad level home with multiple half staircases. Subjective   General  Chart Reviewed: Yes  Patient assessed for rehabilitation services?: Yes  Response to previous treatment: Patient with no complaints from previous session  Family / Caregiver Present: No  Referring Practitioner: Kristina Del Valle DO, for d/c planning  Diagnosis: R lateral tibia plateau fx  Subjective  Subjective: Pt supine in bed at entry with [address] donned, pleasant and agreeable to therapy session. General Comment  Comments: Pt supine to sit Supervision. Pt sit to stand/stand pivot/stand to sit CGA EOB to . Pt in bathroom, stand pivot toilet transfer CGA (pt pull off brief/hygiene care) at [facility]. Pt seated in wc at sink with groomnig (oral care/wash face/comb hair/wash hands) performed at [facility]. Pt inroom with  mobility Independent to retrieve clothing from closet.  Pt dressed UB - bra/shirt at [facility] and short/brief (Min A to pull up shorts) - pt in stance to pull up brie fat EOB using bed rail to hold on to, pt with seated rest break then in stance to pull up shorts requiring Min A. Discussed with pt and pt plans to wear brief and moo-moo at home for ease with LB clothing mgmt. Pt Independent wc mobility to therapy gym ~150 ft. Pt sit to stand at barre [facility], pt in stance 2 min 34 sec for dynamic standing balance activity (word scramble) performed at [facility]. Pt wc moiblity Independent ~123 pt back to room. Pt stand pivot [facility] wc to recliner. Pt served breakfast and ate at [address]. Call light in reach and chair alarm on. Vital Signs  Patient Currently in Pain: Denies   Orientation  Orientation  Overall Orientation Status: Within Normal Limits  Objective    ADL  Feeding: Modified independent (lowe dentures)  Grooming: Independent  UE Dressing: Independent  LE Dressing: Minimal assistance; Increased time to complete        Balance  Sitting Balance: Modified independent   Standing Balance: Contact guard assistance  Standing Balance  Time: ~4 min total  Activity: stand pivot transfers, LB clothing mgmt, dynamic standing balance activity  Functional Mobility  Functional - Mobility Device: Wheelchair  Activity: To/from bathroom;Transport items; To/From therapy gym  Assist Level: Modified independent   Toilet Transfers  Toilet - Technique: Stand pivot  Equipment Used: Raised toilet seat with rails  Toilet Transfer: Contact guard assistance  Toilet Transfers Comments: use of grab bar  Bed mobility  Supine to Sit: Supervision  Scooting: Supervision  Transfers  Stand Pivot Transfers: Contact guard assistance  Sit to stand: Contact guard assistance  Stand to sit: Contact guard assistance         Cognition  Overall Cognitive Status: WFL          ADDENDUM [id number]  Pt in room just finishing with PT. Pt pleasant and agreeable to therapy session, reports no pain. Pt CGA stand pivot wc to recliner.  Pt seated in recliner with the following RUE  strengthening activities: 3# digii flex 20 reps x 3 sets, 9# digi flex 10 reps x 3 sets. Pt with yellow flexbar RUE 12 clockwise/12 counter clockwise  strength exercise x 3 sets each and RUE 20 wrist flexion/extension x 3 sets. Pt with theraputty: RUE rolling, forming into balls, pinching. Call light in reach and chair alarm on.                 Plan   Plan  Times per week: 90 minutes 5 days per week   Times per day: Daily  Specific instructions for Next Treatment:    Current Treatment Recommendations: Functional Mobility Training, Safety Education & Training, Balance Training, Self-Care / ADL, Endurance Training, Wheelchair Mobility Training, Patient/Caregiver Education & Training    Goals  Short term goals  Time Frame for Short term goals: 3 weeks   Short term goal 1: Mod I functional transfers to toilet and shower   Short term goal 2: mod I UB bathing/dressing - goal met 6/25  Short term goal 3: min A LB bathing/dressing - goal met bathing 6/22  Short term goal 4: min A toileting - goal met 6/22  Short term goal 5: mod I functional mobility from w/c level for ADLs/IADLs - goal met 6/24  Patient Goals   Patient goals : \"to return home\"        Therapy Time   Individual Concurrent Group Co-treatment   Time In 0730         Time Out 0830         Minutes 60         Timed Code Treatment Minutes: 60 Minutes      Therapy Time   Individual Concurrent Group Co-treatment   Time In 1030         Time Out 1100         Minutes 30         Timed Code Treatment Minutes: 30 Minutes  Total Minutes: 1400 [facility], [address]  Cosign: Shaun VENTURA/SHAYE, 116 [facility] #[id number]

## 2020-06-25 NOTE — PROGRESS NOTES
Patient would like to try increasing her fluid intake instead of starting an IV and fluids. Dr. Jazmyn Cabral in agreement. Will recheck labs in the morning.

## 2020-06-25 NOTE — PROGRESS NOTES
Physical Therapy  Facility/Department: [facility] ACUTE REHAB UNIT  Daily Treatment Note  NAME: Lawrence Nathan  : 1933  MRN: [id number]    Date of Service: 2020    Discharge Recommendations:  Home with Home health PT, 24 hour supervision or assist, S Level 3   PT Equipment Recommendations  Equipment Needed: Yes  Mobility Devices: Wheelchair  Wheelchair: Light Weight  Other: 18\" manual w/c with cushion and elevating leg rests    Assessment   Body structures, Functions, Activity limitations: Decreased functional mobility ; Decreased ROM; Decreased strength;Decreased balance;Decreased endurance; Increased pain;Decreased ADL status; Decreased safe awareness  Assessment: Patient continues to progress stability during functional transfers in response to therapy services. Patient continues to require VC for proper transfer set up and mobility sequence. Pt will benefit from continued skilled therapy services to improve functional mobility level. Treatment Diagnosis: Impaired balance, impaired functional mobility  Prognosis: Good  PT Education: Goals;Transfer Training;Functional Mobility Training;General Safety; Energy Conservation  Patient Education: w/c part management. w/c based transfer set up.  pt verbalized understanding, will continue to reinforce  Barriers to Learning: hearing  REQUIRES PT FOLLOW UP: Yes  Activity Tolerance  Activity Tolerance: Patient limited by fatigue     Patient Diagnosis(es): (R) tibial plateau fracture     has a past medical history of Arthritis, Diabetes mellitus ([address] Hyperlipidemia, Hypertension, and Paroxysmal atrial fibrillation ([facility] [address] has a past surgical history that includes Cholecystectomy ().     Restrictions  Restrictions/Precautions  Restrictions/Precautions: Weight Bearing  Required Braces or Orthoses?: Yes  Lower Extremity Weight Bearing Restrictions  Right Lower Extremity Weight Bearing: Non Weight Bearing  Required Braces or Orthoses  Right Lower Extremity Brace:

## 2020-06-25 NOTE — PLAN OF CARE
Problem: Falls - Risk of:  Goal: Will remain free from falls  Description: Will remain free from falls  6/25/2020 0916 by Nicole Hanna RN  Outcome: Ongoing  Note: Pt remains free from falls. Safety precautions in place. Bed in lowest position, bed/chair wheels locked, call light with in reach, bed/chair alarm on,  fall risk wrist band on, SAFE outside of doorway. Will continue to monitor.

## 2020-06-25 NOTE — PROGRESS NOTES
Dejah Lomas  6/25/2020  [id number]    Chief Complaint: Closed fracture of right tibial plateau    Subjective:   No overnight events. Patient seen this am sitting up in wheelchair. Reports some fatigue and constipation. Otherwise feeling well. Willing to take prn bowel meds. Labs reviewed. ROS: No CP, SOB, dyspnea    Objective:  Patient Vitals for the past 24 hrs:   BP Temp Temp src Pulse Resp SpO2 Weight   06/25/20 0750 (!) 157/55 97.8 °F (36.6 °C) Oral 65 16 93 % --   06/25/20 0500 -- -- -- -- -- -- 149 lb 6.4 oz (67.8 kg)   06/24/20 2045 (!) 175/65 97.7 °F (36.5 °C) Oral 65 16 98 % --     Gen: No distress, pleasant. HEENT: Normocephalic, atraumatic  CV: Regular rate and rhythm. No MRG   Resp: No respiratory distress. CTAB   Abd: Soft, nontender nondistended  Ext: Mild RLE edema, calf pain remains, RLE in immobilizer  Neuro: Alert, oriented, appropriately interactive. Laboratory data: Available via EMR. Therapy progress:  PT  Required Braces or Orthoses  Right Lower Extremity Brace: Knee Immobilizer  Right Upper Extremity Brace/Splint: Pt wears R UE splint for support w/WB; broke wrist 4/2019  Position Activity Restriction  Other position/activity restrictions: 66-year-old female with a history of diabetes, hyperlipidemia, hypertension, and [facility] who was admitted on [facility]/10 with right knee pain after falling down a few stairs. X-ray revealed a comminuted and mildly depressed fracture involving the lateral tibial plateau as well as a proximal fracture of the fibular head. Ortho evaluated and suggested conservative management with nonweightbearing in the right lower extremity. Therapy evaluated and suggested she continue in an inpatient setting prior to returning home. She reports her pain is controlled. She lives in a quad level home with multiple half staircases. Objective     Sit to Stand: Contact guard assistance(requires multiple attempts to achieve standing.   VC for anterior weight shift.)  Stand to sit: Contact guard assistance  Bed to Chair: 2 Person Assistance(Max A x 2 )     OT  PT Equipment Recommendations  Equipment Needed: Yes  Mobility Devices: Wheelchair  Wheelchair: Light Weight  Other: 18\" manual w/c with cushion and elevating leg rests  Toilet - Technique: Stand pivot  Equipment Used: Raised toilet seat with rails  Toilet Transfers Comments: use of grab bar  Assessment        SLP  Current Diet : Regular  Current Liquid Diet : Thin  Diet Solids Recommendation: Regular(Pt able to select soft food items she knows she can tolerate without lower dentures in place)  Liquid Consistency Recommendation: Thin    Body mass index is 26.47 kg/m². Assessment:  Patient Active Problem List   Diagnosis    Paroxysmal atrial fibrillation (HCC)    Closed fracture of lateral portion of right tibial plateau    Chronic anticoagulation    Closed fracture of upper end of right fibula    Fall on stairs    Closed fracture of right tibial plateau       Plan:   Right tibial plateau fracture: NWB in immobilizer ~10 weeks. Pain control. PT/OT     Proximal right fibular fracture: as above    NATHAN: IVF, repeat BMP tomorrow     Afib: eliquis     HTN: norvasc increased to 10     HLD: lipitor 40     DM: glipizide 5, tradjenta 5, SSI    Right calf pain: negative DVT US     Bowels: Per protocol  Bladder: Per protocol  Sleep: Trazodone provided prn. Pain: tylenol, braxton PRN   DVT PPx: eliquis   ELOS: 6/30 (home with family assist, [place] PT/OT/Aide)    [address]. Shirlene Cooper MD 6/25/2020, 1:15 PM    * This document was created using dictation software. While all precautions were taken to ensure accuracy, errors may have occurred. Please disregard any typographical errors.

## 2020-06-26 LAB
ANION GAP SERPL CALCULATED.3IONS-SCNC: 8 MMOL/L (ref 3–16)
BUN BLDV-MCNC: 24 MG/DL (ref 7–20)
CALCIUM SERPL-MCNC: 8.4 MG/DL (ref 8.3–10.6)
CHLORIDE BLD-SCNC: 103 MMOL/L (ref 99–110)
CO2: 20 MMOL/L (ref 21–32)
CREAT SERPL-MCNC: 1.1 MG/DL (ref 0.6–1.2)
GFR AFRICAN AMERICAN: 57
GFR NON-AFRICAN AMERICAN: 47
GLUCOSE BLD-MCNC: 112 MG/DL (ref 70–99)
GLUCOSE BLD-MCNC: 113 MG/DL (ref 70–99)
GLUCOSE BLD-MCNC: 137 MG/DL (ref 70–99)
GLUCOSE BLD-MCNC: 166 MG/DL (ref 70–99)
GLUCOSE BLD-MCNC: 175 MG/DL (ref 70–99)
PERFORMED ON: ABNORMAL
POTASSIUM REFLEX MAGNESIUM: 5 MMOL/L (ref 3.5–5.1)
SODIUM BLD-SCNC: 131 MMOL/L (ref 136–145)

## 2020-06-26 PROCEDURE — 97140 MANUAL THERAPY 1/> REGIONS: CPT

## 2020-06-26 PROCEDURE — 6370000000 HC RX 637 (ALT 250 FOR IP): Performed by: PHYSICAL MEDICINE & REHABILITATION

## 2020-06-26 PROCEDURE — 97530 THERAPEUTIC ACTIVITIES: CPT

## 2020-06-26 PROCEDURE — 1280000000 HC REHAB R&B

## 2020-06-26 PROCEDURE — 80048 BASIC METABOLIC PNL TOTAL CA: CPT

## 2020-06-26 PROCEDURE — 97110 THERAPEUTIC EXERCISES: CPT

## 2020-06-26 PROCEDURE — 97535 SELF CARE MNGMENT TRAINING: CPT

## 2020-06-26 PROCEDURE — 36415 COLL VENOUS BLD VENIPUNCTURE: CPT

## 2020-06-26 PROCEDURE — 97116 GAIT TRAINING THERAPY: CPT

## 2020-06-26 RX ORDER — BENZONATATE 100 MG/1
100 CAPSULE ORAL 3 TIMES DAILY PRN
Status: DISCONTINUED | OUTPATIENT
Start: 2020-06-26 | End: 2020-07-01 | Stop reason: HOSPADM

## 2020-06-26 RX ADMIN — AMLODIPINE BESYLATE 7.5 MG: 5 TABLET ORAL at 09:23

## 2020-06-26 RX ADMIN — LINAGLIPTIN 5 MG: 5 TABLET, FILM COATED ORAL at 09:23

## 2020-06-26 RX ADMIN — GLIPIZIDE 5 MG: 5 TABLET ORAL at 06:26

## 2020-06-26 RX ADMIN — SENNOSIDES 17.2 MG: 8.6 TABLET, FILM COATED ORAL at 09:23

## 2020-06-26 RX ADMIN — BENZONATATE 100 MG: 100 CAPSULE ORAL at 21:43

## 2020-06-26 RX ADMIN — APIXABAN 2.5 MG: 2.5 TABLET, FILM COATED ORAL at 09:23

## 2020-06-26 RX ADMIN — INSULIN LISPRO 1 UNITS: 100 INJECTION, SOLUTION INTRAVENOUS; SUBCUTANEOUS at 12:32

## 2020-06-26 RX ADMIN — DESMOPRESSIN ACETATE 40 MG: 0.2 TABLET ORAL at 09:23

## 2020-06-26 RX ADMIN — INSULIN LISPRO 1 UNITS: 100 INJECTION, SOLUTION INTRAVENOUS; SUBCUTANEOUS at 17:12

## 2020-06-26 RX ADMIN — OXYCODONE 10 MG: 5 TABLET ORAL at 21:42

## 2020-06-26 RX ADMIN — APIXABAN 2.5 MG: 2.5 TABLET, FILM COATED ORAL at 21:43

## 2020-06-26 RX ADMIN — LATANOPROST 1 DROP: 50 SOLUTION OPHTHALMIC at 23:26

## 2020-06-26 ASSESSMENT — PAIN SCALES - GENERAL
PAINLEVEL_OUTOF10: 7
PAINLEVEL_OUTOF10: 2
PAINLEVEL_OUTOF10: 7
PAINLEVEL_OUTOF10: 4

## 2020-06-26 ASSESSMENT — PAIN DESCRIPTION - PAIN TYPE
TYPE: ACUTE PAIN
TYPE: ACUTE PAIN

## 2020-06-26 ASSESSMENT — PAIN DESCRIPTION - FREQUENCY
FREQUENCY: INTERMITTENT
FREQUENCY: INTERMITTENT

## 2020-06-26 ASSESSMENT — PAIN DESCRIPTION - ONSET
ONSET: ON-GOING
ONSET: ON-GOING

## 2020-06-26 ASSESSMENT — PAIN DESCRIPTION - PROGRESSION
CLINICAL_PROGRESSION: GRADUALLY IMPROVING
CLINICAL_PROGRESSION: NOT CHANGED

## 2020-06-26 ASSESSMENT — PAIN DESCRIPTION - ORIENTATION
ORIENTATION: LOWER;RIGHT
ORIENTATION: LOWER;RIGHT

## 2020-06-26 ASSESSMENT — PAIN DESCRIPTION - DESCRIPTORS
DESCRIPTORS: ACHING
DESCRIPTORS: ACHING;SHARP

## 2020-06-26 ASSESSMENT — PAIN DESCRIPTION - LOCATION
LOCATION: BACK;KNEE
LOCATION: BACK;KNEE

## 2020-06-26 NOTE — PROGRESS NOTES
Physical Therapy  Facility/Department: [facility] ACUTE REHAB UNIT  Daily Treatment Note  NAME: Quin Bautista  : 1933  MRN: [id number]    Date of Service: 2020    Discharge Recommendations:  Home with Home health PT, 24 hour supervision or assist, S Level 3   PT Equipment Recommendations  Equipment Needed: Yes  Mobility Devices: Wheelchair  Wheelchair: Light Weight  Other: 18\" manual w/c with cushion and elevating leg rests    Assessment   Body structures, Functions, Activity limitations: Decreased functional mobility ; Decreased ROM; Decreased strength;Decreased balance;Decreased endurance; Increased pain;Decreased ADL status; Decreased safe awareness  Assessment: Pt continues to improve with transfers and able to perform pre-gait tasks with CGA this date. Pt continues to present with decreased activity tolerance with tasks, requiring frequent rest breaks. Pt would benefit form continued skilled PT services to address deficits. Treatment Diagnosis: Impaired balance, impaired functional mobility  Prognosis: Good  Decision Making: Medium Complexity  PT Education: Goals;Transfer Training;Functional Mobility Training;General Safety; Energy Conservation;Gait Training;PT Role;Plan of Care;Weight-bearing Education  Patient Education: w/c part management. w/c based transfer set up.  pt verbalized understanding, will continue to reinforce  Barriers to Learning: hearing  REQUIRES PT FOLLOW UP: Yes  Activity Tolerance  Activity Tolerance: Patient limited by fatigue  Activity Tolerance: Pt required frequent seated rest breaks throughout session. Patient Diagnosis(es): There were no encounter diagnoses. has a past medical history of Arthritis, Diabetes mellitus ([facility] [address] Hyperlipidemia, Hypertension, and Paroxysmal atrial fibrillation ([facility] [address] has a past surgical history that includes Cholecystectomy ().     Restrictions  Restrictions/Precautions  Restrictions/Precautions: Weight Bearing  Required Braces or Orthoses?: Yes  Lower Extremity Weight Bearing Restrictions  Right Lower Extremity Weight Bearing: Non Weight Bearing  Required Braces or Orthoses  Right Lower Extremity Brace: Knee Immobilizer  Right Upper Extremity Brace/Splint: Pt wears R UE splint for support w/WB; robert wrist 4/2019  Position Activity Restriction  Other position/activity restrictions: 60-year-old female with a history of diabetes, hyperlipidemia, hypertension, and A. fib on Eliquis who was admitted on 6/10 with right knee pain after falling down a few stairs. X-ray revealed a comminuted and mildly depressed fracture involving the lateral tibial plateau as well as a proximal fracture of the fibular head. Ortho evaluated and suggested conservative management with nonweightbearing in the right lower extremity. Therapy evaluated and suggested she continue in an inpatient setting prior to returning home. She reports her pain is controlled. She lives in a quad level home with multiple half staircases. Subjective   General  Chart Reviewed: Yes  Response To Previous Treatment: Patient with no complaints from previous session. Family / Caregiver Present: No  Subjective  Subjective: Pt reports no pain at this time. Agreeable to PT session. General Comment  Comments: Pt seated in recliner upon arrival.   Pain Screening  Patient Currently in Pain: Denies  Vital Signs  Patient Currently in Pain: Denies       Orientation  Orientation  Overall Orientation Status: Within Normal Limits     Objective   Bed mobility  Comment: Not addressed, pt seated in chair at beginning and end of session. Transfers  Sit to Stand: Contact guard assistance;Minimal Assistance(CGA from recliner and from w/c in // bars 2x; Min A from w/c in // bars 2x)  Stand to sit: Contact guard assistance  Stand Pivot Transfers: Contact guard assistance(recliner<>w/c)  Comment: Pt able to maintain restricted WB during all transfers without VC this date.    Ambulation  Ambulation?: No(Performed pre-gait activities in // bars as documented below. )  Stairs/Curb  Stairs?: No     Balance  Posture: Good  Sitting - Static: Good  Sitting - Dynamic: Good;-  Standing - Static: Fair;-(stood in // bars with BUE support with CGA 3-4x for ~30 seconds each)  Standing - Dynamic: Fair;-            Comment: 1st session: Pt performed transfers as documented above. Pt performed STS in // bars and performed pre-gait tasks of single leg squats and TR on LLE, hopping in place, and forward/retro hop in // bars all with CGA for balance. Pt returned to room via w/c and pivoted back to recliner with CGA. Pt left seated in chair with all needs in reach and alarm engaged. Second Session  Subjective: Pt seated in recliner upon arrival. Reporting no pain, agreeable to PT session. Objective: Pt pivoted recliner>w/c>toilet with CGA and use of grab bar with toilet transfer. Pt toileted with supervision and able to manage clothing with supervision. Pt stood from toilet with CGA and pivoted to w/c with CGA. Pt brought to gym and pivoted w/c>mat table CGA. Pt assumed supine on mat table with SBA and performed the following supine exercises: 2x10 SLR flexion to BLE, 2x10 SLR ABD BLE, 2x10 heel slides to LLE. Pt required increased time to perform exercises with extended rest break between each exercise. Pt assumed sitting EOM with SBA and pivoted back to w/c CGA. Pt returned to room via w/c and pivoted w/c>recliner CGA. Pt left seated in recliner with all needs in reach and alarm engaged. Third Session:   Subjective: Pt seated in w/c with OT in dining room upon arrival. Pt reporting discomfort in lower back, not providing pain rating. Objective: Pt brought to mat table and pivoted w/c>mat CGA and assumed supine with SBA onto moist heat pack.  Pt laid supine on heat pack while PT performing manual stretching to BLE ~6-8 minutes total. Pt reporting decreased discomfort in lower back following stretching and heat application. Pt assumed sitting EOM with SBA and pivoted EOM>w/c CGA. Pt brought back to room and pivoted w/c>recliner with CGA. Pt left seated in recliner with all needs in reach and alarm engaged. G-Code     OutComes Score                 AM-PAC Score             Goals  Short term goals  Time Frame for Short term goals: 1 week  Short term goal 1: Supine to/from sit with Min A - goal met 6/19/2020  Short term goal 2: Sit to/from stand with Mod A x 1 - goal met 6/17/2020  Short term goal 3: Propel w/c x 76' with Min A - goal met 6/15/2020  Short term goal 4: Amb x 1 lap in 11 bars with Mod A  Long term goals  Time Frame for Long term goals : 2 weeks  Long term goal 1: Supine to/from sit with Mod I  Long term goal 2: Sit to/from stand with Mod I  Long term goal 3: Bed to/from w/c with Supervision  Long term goal 4: Amb. x 25' with FWW and Min A  Long term goal 5: Propel w/c x 150' with Mod I  Long term goal 6: Car transfer with [address] term goal 7: Up and down 4 steps backward with Min A   Patient Goals   Patient goals : Improve mobility    Plan    Plan  Times per week: 90 minutes/day, 5 days/wk  Times per day: Daily  Current Treatment Recommendations: ROM, Balance Training, Functional Mobility Training, Transfer Training, Neuromuscular Re-education, Endurance Training, Safety Education & Training, Gait Training, Equipment Evaluation, Education, & procurement, Patient/Caregiver Education & Training, Stair training, Home Exercise Program, ADL/Self-care Training, Strengthening, Pain Management, Modalities  Safety Devices  Type of devices:  All fall risk precautions in place, Call light within reach, Left in chair, Chair alarm in place, Gait belt  Restraints  Initially in place: No     Therapy Time   Individual Concurrent Group Co-treatment   Time In 0945         Time Out 1015         Minutes 30              Timed Code Treatment Minutes:  30  Total Treatment Minutes:  30    Second Session Therapy Time: Individual Concurrent Group Co-treatment   Time In 1100         Time Out 1130         Minutes 30           Timed Code Treatment Minutes:  30+30    Total Treatment Minutes:  60    Third Session Therapy Time:   Individual Concurrent Group Co-treatment   Time In 0503         Time Out 4633         Minutes 30           Timed Code Treatment Minutes:  38+10+22    Total Treatment Minutes:  Deanna, Manjit [place], [place] [id number]

## 2020-06-26 NOTE — PLAN OF CARE
Problem: Falls - Risk of:  Goal: Will remain free from falls  Description: Will remain free from falls  6/25/2020 2254 by Carlitos Sánchez RN  Outcome: Ongoing     Problem: Falls - Risk of:  Goal: Absence of physical injury  Description: Absence of physical injury  Outcome: Ongoing     Problem: Pain:  Goal: Control of acute pain  Description: Control of acute pain  Outcome: Ongoing

## 2020-06-26 NOTE — PLAN OF CARE
Problem: Falls - Risk of:  Goal: Will remain free from falls  Description: Will remain free from falls  6/26/2020 1207 by Nicole Hanna RN  Outcome: Ongoing  Note: Pt remains free from falls. Safety precautions in place. Bed in lowest position, bed/chair wheels locked, call light with in reach, bed/chair alarm on,  fall risk wrist band on, SAFE outside of doorway. Will continue to monitor.

## 2020-06-26 NOTE — PROGRESS NOTES
Occupational Therapy  Facility/Department: [facility] ACUTE REHAB UNIT  Daily Treatment Note  NAME: Katherine Geronimo  : 1933  MRN: [id number]    Date of Service: 2020    Discharge Recommendations:  Home with Home health OT, S Level 3, Home with assist PRN  OT Equipment Recommendations  Equipment Needed: Yes  ADL Assistive Devices: Transfer Tub Bench  Other: has raised toilet seat, hip kit, will likely require w/c for mobility     Assessment   Performance deficits / Impairments: Decreased functional mobility ; Decreased balance;Decreased ADL status; Decreased endurance;Decreased high-level IADLs  Assessment: Pt is not at her baseline of occupational function. Pt continues to require CGA for functional transfers (stand pivot). Pt requires Min A for LB dressing to pull up shorts and plans at d/c home to wear moo-moo and brief for eaier LB dressing. Pt would benefit from continued intensive skilled acute OT services to addressed these deficits. Continue with POC. Treatment Diagnosis: Decreased ADL/IADL status, functional mobility, endurance and balance associated with R lateral tibia plateau fx  Exam: ADLs and transfers  Assistance / Modification: CGA for functional transfers, Min A for LB clothing mgmt/toileting  OT Education: OT Role;Transfer Training  Patient Education: pt demonstrated understanding  REQUIRES OT FOLLOW UP: Yes  Activity Tolerance  Activity Tolerance: Patient Tolerated treatment well  Safety Devices  Safety Devices in place: Yes  Type of devices: Left in chair;Call light within reach; Chair alarm in place; All fall risk precautions in place;Nurse notified         Patient Diagnosis(es): There were no encounter diagnoses. has a past medical history of Arthritis, Diabetes mellitus ([address] Hyperlipidemia, Hypertension, and Paroxysmal atrial fibrillation ([facility] [address] has a past surgical history that includes Cholecystectomy ().     Restrictions  Restrictions/Precautions  Restrictions/Precautions: Weight Bearing  Required Braces or Orthoses?: Yes  Lower Extremity Weight Bearing Restrictions  Right Lower Extremity Weight Bearing: Non Weight Bearing  Required Braces or Orthoses  Right Lower Extremity Brace: Knee Immobilizer  Right Upper Extremity Brace/Splint: Pt wears R UE splint for support w/WB; broke wrist 4/2019  Position Activity Restriction  Other position/activity restrictions: 70-year-old female with a history of diabetes, hyperlipidemia, hypertension, and A. fib on Eliquis who was admitted on 6/10 with right knee pain after falling down a few stairs. X-ray revealed a comminuted and mildly depressed fracture involving the lateral tibial plateau as well as a proximal fracture of the fibular head. Ortho evaluated and suggested conservative management with nonweightbearing in the right lower extremity. Therapy evaluated and suggested she continue in an inpatient setting prior to returning home. She reports her pain is controlled. She lives in a quad level home with multiple half staircases. Subjective   General  Chart Reviewed: Yes  Patient assessed for rehabilitation services?: Yes  Response to previous treatment: Patient with no complaints from previous session  Family / Caregiver Present: No  Referring Practitioner: Janes Harp DO, for d/c planning  Diagnosis: R lateral tibia plateau fx  Subjective  Subjective: Pt transferring from toilet with PCA upon entry. Pt pleasant, agreeable to therapy session and requesting shower. General Comment  Comments: Pt in wc Mod I to closet to gather clothing and back to bathroom. Pt stand pivot shower transfer CGA. Pt bathed UB (Mod I) and LB (CGA in stance to dry buttocks). Pt dressed UB - bra/shirt (Independent) and LB - sock/shorts/brief (setup/Min A to pull up shorts. Pt seated in wc at sink groomed (oral care/hair) at Independent. Pt stand pivot CGA wc to recliner. Call light in reach, chair alarm on. and RN present.   Vital Signs  Patient Currently in

## 2020-06-26 NOTE — PROGRESS NOTES
Dejah Lomas  6/26/2020  [id number]    Chief Complaint: Closed fracture of right tibial plateau    Subjective:   No overnight events. Patient seen this afternoon sitting up in room. She refused IV fluids last night and preferred to attempt increasing her p.o. fluid intake. Renal function improved today. We discussed hydration goals. ROS: No CP, SOB, dyspnea    Objective:  Patient Vitals for the past 24 hrs:   BP Temp Temp src Pulse Resp SpO2 Weight   06/26/20 0915 (!) 150/62 98.1 °F (36.7 °C) Oral 67 16 99 % --   06/26/20 0645 -- -- -- -- -- -- 150 lb 6.4 oz (68.2 kg)   06/25/20 2030 (!) 185/69 97.6 °F (36.4 °C) Oral 64 16 100 % --     Gen: No distress, pleasant. HEENT: Normocephalic, atraumatic  CV: Regular rate and rhythm. No MRG   Resp: No respiratory distress. CTAB   Abd: Soft, nontender nondistended  Ext: Mild RLE edema, calf pain remains, RLE in immobilizer  Neuro: Alert, oriented, appropriately interactive. Laboratory data: Available via EMR. Therapy progress:  PT  Required Braces or Orthoses  Right Lower Extremity Brace: Knee Immobilizer  Right Upper Extremity Brace/Splint: Pt wears R UE splint for support w/WB; broke wrist 4/2019  Position Activity Restriction  Other position/activity restrictions: 55-year-old female with a history of diabetes, hyperlipidemia, hypertension, and A. fib on [facility] who was admitted on 6/10 with right knee pain after falling down a few stairs. X-ray revealed a comminuted and mildly depressed fracture involving the lateral tibial plateau as well as a proximal fracture of the fibular head. Ortho evaluated and suggested conservative management with nonweightbearing in the right lower extremity. Therapy evaluated and suggested she continue in an inpatient setting prior to returning home. She reports her pain is controlled. She lives in a quad level home with multiple half staircases.   Objective     Sit to Stand: Contact guard assistance, Minimal Assistance(CGA from recliner and from w/c in // bars 2x; Min A from w/c in // bars 2x)  Stand to sit: Contact guard assistance  Bed to Chair: 2 Person Assistance(Max A x 2 )     OT  PT Equipment Recommendations  Equipment Needed: Yes  Mobility Devices: Wheelchair  Wheelchair: Light Weight  Other: 18\" manual w/c with cushion and elevating leg rests  Toilet - Technique: Stand pivot  Equipment Used: Raised toilet seat with rails  Toilet Transfers Comments: use of grab bar  Assessment        SLP  Current Diet : Regular  Current Liquid Diet : Thin  Diet Solids Recommendation: Regular(Pt able to select soft food items she knows she can tolerate without lower dentures in place)  Liquid Consistency Recommendation: Thin    Body mass index is 26.64 kg/m². Assessment:  Patient Active Problem List   Diagnosis    Paroxysmal atrial fibrillation (HCC)    Closed fracture of lateral portion of right tibial plateau    Chronic anticoagulation    Closed fracture of upper end of right fibula    Fall on stairs    Closed fracture of right tibial plateau       Plan:   Right tibial plateau fracture: NWB in immobilizer ~10 weeks. Pain control. PT/OT     Proximal right fibular fracture: as above    NATHAN: Patient refused IVF, Cr much improved with increased po fluid intake     Afib: eliquis     HTN: norvasc increased to 10     HLD: lipitor 40     DM: glipizide 5, tradjenta 5, SSI    Right calf pain: negative DVT US     Bowels: Per protocol  Bladder: Per protocol  Sleep: Trazodone provided prn. Pain: tylenol, braxton PRN   DVT PPx: eliquis   ELOS: 6/30 (home with family assist, [facility] PT/OT/Aide)    Regina Pimentel. Amilcar Mcmahon MD 6/26/2020, 3:25 PM    * This document was created using dictation software. While all precautions were taken to ensure accuracy, errors may have occurred. Please disregard any typographical errors.

## 2020-06-27 LAB
GLUCOSE BLD-MCNC: 160 MG/DL (ref 70–99)
GLUCOSE BLD-MCNC: 160 MG/DL (ref 70–99)
GLUCOSE BLD-MCNC: 175 MG/DL (ref 70–99)
GLUCOSE BLD-MCNC: 99 MG/DL (ref 70–99)
PERFORMED ON: ABNORMAL
PERFORMED ON: NORMAL

## 2020-06-27 PROCEDURE — 1280000000 HC REHAB R&B

## 2020-06-27 PROCEDURE — 6370000000 HC RX 637 (ALT 250 FOR IP): Performed by: PHYSICAL MEDICINE & REHABILITATION

## 2020-06-27 RX ORDER — AMLODIPINE BESYLATE 5 MG/1
10 TABLET ORAL DAILY
Status: DISCONTINUED | OUTPATIENT
Start: 2020-06-28 | End: 2020-07-01 | Stop reason: HOSPADM

## 2020-06-27 RX ADMIN — OXYCODONE 10 MG: 5 TABLET ORAL at 20:51

## 2020-06-27 RX ADMIN — LINAGLIPTIN 5 MG: 5 TABLET, FILM COATED ORAL at 08:31

## 2020-06-27 RX ADMIN — OXYCODONE 10 MG: 5 TABLET ORAL at 15:47

## 2020-06-27 RX ADMIN — INSULIN LISPRO 1 UNITS: 100 INJECTION, SOLUTION INTRAVENOUS; SUBCUTANEOUS at 12:36

## 2020-06-27 RX ADMIN — APIXABAN 2.5 MG: 2.5 TABLET, FILM COATED ORAL at 20:51

## 2020-06-27 RX ADMIN — GLIPIZIDE 5 MG: 5 TABLET ORAL at 06:15

## 2020-06-27 RX ADMIN — INSULIN LISPRO 1 UNITS: 100 INJECTION, SOLUTION INTRAVENOUS; SUBCUTANEOUS at 22:58

## 2020-06-27 RX ADMIN — DESMOPRESSIN ACETATE 40 MG: 0.2 TABLET ORAL at 08:32

## 2020-06-27 RX ADMIN — SENNOSIDES 17.2 MG: 8.6 TABLET, FILM COATED ORAL at 20:51

## 2020-06-27 RX ADMIN — OXYCODONE 10 MG: 5 TABLET ORAL at 03:58

## 2020-06-27 RX ADMIN — AMLODIPINE BESYLATE 7.5 MG: 5 TABLET ORAL at 08:31

## 2020-06-27 RX ADMIN — LATANOPROST 1 DROP: 50 SOLUTION OPHTHALMIC at 20:52

## 2020-06-27 RX ADMIN — INSULIN LISPRO 1 UNITS: 100 INJECTION, SOLUTION INTRAVENOUS; SUBCUTANEOUS at 17:08

## 2020-06-27 RX ADMIN — APIXABAN 2.5 MG: 2.5 TABLET, FILM COATED ORAL at 08:32

## 2020-06-27 ASSESSMENT — PAIN SCALES - GENERAL
PAINLEVEL_OUTOF10: 0
PAINLEVEL_OUTOF10: 2
PAINLEVEL_OUTOF10: 0
PAINLEVEL_OUTOF10: 7
PAINLEVEL_OUTOF10: 7
PAINLEVEL_OUTOF10: 6
PAINLEVEL_OUTOF10: 0
PAINLEVEL_OUTOF10: 0
PAINLEVEL_OUTOF10: 6

## 2020-06-27 ASSESSMENT — PAIN DESCRIPTION - PROGRESSION
CLINICAL_PROGRESSION: NOT CHANGED
CLINICAL_PROGRESSION: NOT CHANGED

## 2020-06-27 ASSESSMENT — PAIN DESCRIPTION - DESCRIPTORS
DESCRIPTORS: SHARP
DESCRIPTORS: ACHING

## 2020-06-27 ASSESSMENT — PAIN DESCRIPTION - ONSET
ONSET: ON-GOING

## 2020-06-27 ASSESSMENT — PAIN DESCRIPTION - PAIN TYPE: TYPE: ACUTE PAIN

## 2020-06-27 ASSESSMENT — PAIN DESCRIPTION - ORIENTATION
ORIENTATION: RIGHT
ORIENTATION: RIGHT

## 2020-06-27 ASSESSMENT — PAIN DESCRIPTION - FREQUENCY
FREQUENCY: INTERMITTENT
FREQUENCY: INTERMITTENT

## 2020-06-27 ASSESSMENT — PAIN DESCRIPTION - LOCATION
LOCATION: KNEE
LOCATION: KNEE

## 2020-06-27 NOTE — PLAN OF CARE
Problem: Falls - Risk of:  Goal: Will remain free from falls  Description: Will remain free from falls  Outcome: Ongoing  Goal: Absence of physical injury  Description: Absence of physical injury  Outcome: Ongoing     Problem: Pain:  Goal: Pain level will decrease  Description: Pain level will decrease  Outcome: Ongoing  Goal: Control of acute pain  Description: Control of acute pain  Outcome: Ongoing  Goal: Control of chronic pain  Description: Control of chronic pain  Outcome: Ongoing     Problem: IP BLADDER/VOIDING  Goal: LTG - patient will achieve acceptable level of continence  Outcome: Ongoing     Problem: IP BOWEL ELIMINATION  Goal: LTG - patient will have regular and routine bowel evacuation  Outcome: Ongoing     Problem: SKIN INTEGRITY  Goal: STG - patient will maintain good skin integrity  Outcome: Ongoing     Problem: Nutrition  Goal: Optimal nutrition therapy  Outcome: Ongoing

## 2020-06-27 NOTE — PROGRESS NOTES
Dejah Lomas  6/27/2020  [id number]    Chief Complaint: Closed fracture of right tibial plateau    Subjective:   Patient doing well this morning sitting up in chair  No new complaints   No events noted overnight      ROS: No CP, SOB, dyspnea    Objective:  Patient Vitals for the past 24 hrs:   BP Temp Temp src Pulse Resp Weight   06/27/20 0830 (!) 168/55 98 °F (36.7 °C) Oral 62 18 --   06/27/20 0545 -- -- -- -- -- 149 lb (67.6 kg)   06/26/20 2137 (!) 190/73 98 °F (36.7 °C) Oral 75 18 --     Gen: No distress, pleasant. HEENT: Normocephalic, atraumatic  CV: Regular rate and rhythm. No MRG   Resp: No respiratory distress. CTAB   Abd: Soft, nontender nondistended  Ext: Mild RLE edema, calf pain remains, RLE in immobilizer  Neuro: Alert, oriented, appropriately interactive. Laboratory data: Available via EMR. Therapy progress:  PT  Required Braces or Orthoses  Right Lower Extremity Brace: Knee Immobilizer  Right Upper Extremity Brace/Splint: Pt wears R UE splint for support w/WB; broke wrist 4/2019  Position Activity Restriction  Other position/activity restrictions: 14-year-old female with a history of diabetes, hyperlipidemia, hypertension, and A. fib on Eliis who was admitted on 6/10 with right knee pain after falling down a few stairs. X-ray revealed a comminuted and mildly depressed fracture involving the lateral tibial plateau as well as a proximal fracture of the fibular head. Ortho evaluated and suggested conservative management with nonweightbearing in the right lower extremity. Therapy evaluated and suggested she continue in an inpatient setting prior to returning home. She reports her pain is controlled. She lives in a quad level home with multiple half staircases. Objective     Sit to Stand: Contact guard assistance, Minimal Assistance(CGA from recliner and from w/c in // bars 2x;  Min A from w/c in // bars 2x)  Stand to sit: Contact guard assistance  Bed to Chair: 2 Person Assistance(Max A x 2 )     OT  PT Equipment Recommendations  Equipment Needed: Yes  Mobility Devices: Wheelchair  Wheelchair: Light Weight  Other: 18\" manual w/c with cushion and elevating leg rests  Toilet - Technique: Stand pivot  Equipment Used: Raised toilet seat with rails  Toilet Transfers Comments: use of grab bar  Assessment        SLP  Current Diet : Regular  Current Liquid Diet : Thin  Diet Solids Recommendation: Regular(Pt able to select soft food items she knows she can tolerate without lower dentures in place)  Liquid Consistency Recommendation: Thin    Body mass index is 26.39 kg/m². Assessment:  Patient Active Problem List   Diagnosis    Paroxysmal atrial fibrillation (HCC)    Closed fracture of lateral portion of right tibial plateau    Chronic anticoagulation    Closed fracture of upper end of right fibula    Fall on stairs    Closed fracture of right tibial plateau       Plan:   Right tibial plateau fracture: NWB in immobilizer ~10 weeks. Pain control. PT/OT     Proximal right fibular fracture: as above    NATHAN: Patient refused IVF, Cr much improved with increased po fluid intake     Afib: eliquis     HTN: norvasc 10mg daily    HLD: lipitor 40     DM: glipizide 5, tradjenta 5, SSI    Right calf pain: negative DVT US    Hyponatremia - follow w/ routine labs on Monday.      Bowels: Per protocol  Bladder: Per protocol  Sleep: Trazodone provided prn.    Pain: tylenol, braxton PRN   DVT PPx: ADAM Wheeler - CNP   6/27/2020, 3:54 PM

## 2020-06-28 LAB
GLUCOSE BLD-MCNC: 162 MG/DL (ref 70–99)
GLUCOSE BLD-MCNC: 237 MG/DL (ref 70–99)
GLUCOSE BLD-MCNC: 248 MG/DL (ref 70–99)
PERFORMED ON: ABNORMAL

## 2020-06-28 PROCEDURE — 6370000000 HC RX 637 (ALT 250 FOR IP): Performed by: NURSE PRACTITIONER

## 2020-06-28 PROCEDURE — 6370000000 HC RX 637 (ALT 250 FOR IP): Performed by: PHYSICAL MEDICINE & REHABILITATION

## 2020-06-28 PROCEDURE — 1280000000 HC REHAB R&B

## 2020-06-28 RX ADMIN — INSULIN LISPRO 2 UNITS: 100 INJECTION, SOLUTION INTRAVENOUS; SUBCUTANEOUS at 17:13

## 2020-06-28 RX ADMIN — LINAGLIPTIN 5 MG: 5 TABLET, FILM COATED ORAL at 08:48

## 2020-06-28 RX ADMIN — AMLODIPINE BESYLATE 10 MG: 5 TABLET ORAL at 08:48

## 2020-06-28 RX ADMIN — OXYCODONE 5 MG: 5 TABLET ORAL at 08:48

## 2020-06-28 RX ADMIN — TRAZODONE HYDROCHLORIDE 50 MG: 50 TABLET ORAL at 21:57

## 2020-06-28 RX ADMIN — SENNOSIDES 17.2 MG: 8.6 TABLET, FILM COATED ORAL at 20:14

## 2020-06-28 RX ADMIN — SENNOSIDES 17.2 MG: 8.6 TABLET, FILM COATED ORAL at 08:48

## 2020-06-28 RX ADMIN — INSULIN LISPRO 1 UNITS: 100 INJECTION, SOLUTION INTRAVENOUS; SUBCUTANEOUS at 20:59

## 2020-06-28 RX ADMIN — OXYCODONE 10 MG: 5 TABLET ORAL at 20:15

## 2020-06-28 RX ADMIN — INSULIN LISPRO 2 UNITS: 100 INJECTION, SOLUTION INTRAVENOUS; SUBCUTANEOUS at 12:14

## 2020-06-28 RX ADMIN — DESMOPRESSIN ACETATE 40 MG: 0.2 TABLET ORAL at 08:48

## 2020-06-28 RX ADMIN — GLIPIZIDE 5 MG: 5 TABLET ORAL at 06:20

## 2020-06-28 RX ADMIN — APIXABAN 2.5 MG: 2.5 TABLET, FILM COATED ORAL at 20:14

## 2020-06-28 RX ADMIN — DOCUSATE SODIUM 100 MG: 100 CAPSULE ORAL at 08:47

## 2020-06-28 RX ADMIN — APIXABAN 2.5 MG: 2.5 TABLET, FILM COATED ORAL at 08:48

## 2020-06-28 ASSESSMENT — PAIN DESCRIPTION - ONSET: ONSET: ON-GOING

## 2020-06-28 ASSESSMENT — PAIN SCALES - GENERAL
PAINLEVEL_OUTOF10: 5
PAINLEVEL_OUTOF10: 0
PAINLEVEL_OUTOF10: 7
PAINLEVEL_OUTOF10: 5
PAINLEVEL_OUTOF10: 5

## 2020-06-28 ASSESSMENT — PAIN DESCRIPTION - PROGRESSION
CLINICAL_PROGRESSION: NOT CHANGED

## 2020-06-28 ASSESSMENT — PAIN DESCRIPTION - LOCATION
LOCATION: KNEE
LOCATION: KNEE

## 2020-06-28 ASSESSMENT — PAIN DESCRIPTION - ORIENTATION
ORIENTATION: RIGHT
ORIENTATION: RIGHT

## 2020-06-28 ASSESSMENT — PAIN DESCRIPTION - DESCRIPTORS: DESCRIPTORS: SHARP

## 2020-06-28 ASSESSMENT — PAIN DESCRIPTION - FREQUENCY: FREQUENCY: INTERMITTENT

## 2020-06-28 NOTE — PROGRESS NOTES
Dejah Lomas  6/28/2020  [id number]    Chief Complaint: Closed fracture of right tibial plateau    Subjective:   Patient doing well this morning lying in bed  No new complaints   No events noted overnight      ROS: No CP, SOB, dyspnea    Objective:  Patient Vitals for the past 24 hrs:   BP Temp Temp src Pulse Resp SpO2 Weight   06/28/20 0847 (!) 160/64 -- -- -- -- -- --   06/28/20 0412 (!) 173/55 -- -- 62 -- -- 148 lb 1.6 oz (67.2 kg)   06/27/20 2245 (!) 185/64 -- -- 64 -- 100 % --   06/27/20 2044 (!) 182/76 97.7 °F (36.5 °C) Oral 62 16 98 % --     Gen: No distress, pleasant. HEENT: Normocephalic, atraumatic  CV: Regular rate and rhythm. No MRG   Resp: No respiratory distress. CTAB   Abd: Soft, nontender nondistended  Ext: Mild RLE edema, calf pain remains, RLE in immobilizer  Neuro: Alert, oriented, appropriately interactive. Laboratory data: Available via EMR. Therapy progress:  PT  Required Braces or Orthoses  Right Lower Extremity Brace: Knee Immobilizer  Right Upper Extremity Brace/Splint: Pt wears R UE splint for support w/WB; broke wrist 4/2019  Position Activity Restriction  Other position/activity restrictions: 26-year-old female with a history of diabetes, hyperlipidemia, hypertension, [facility] with right knee pain after falling down a few stairs. X-ray revealed a comminuted and mildly depressed fracture involving the lateral tibial plateau as well as a proximal fracture of the fibular head. Ortho evaluated and suggested conservative management with nonweightbearing in the right lower extremity. Therapy evaluated and suggested she continue in an inpatient setting prior to returning home. She reports her pain is controlled. She lives in a quad level home with multiple half staircases. Objective     Sit to Stand: Contact guard assistance, Minimal Assistance(CGA from recliner and from w/c in // bars 2x;  Min A from w/c in // bars 2x)  Stand to sit: Contact guard assistance  Bed to Chair: 2 Person Assistance(Max A x 2 )     OT  PT Equipment Recommendations  Equipment Needed: Yes  Mobility Devices: Wheelchair  Wheelchair: Light Weight  Other: 18\" manual w/c with cushion and elevating leg rests  Toilet - Technique: Stand pivot  Equipment Used: Raised toilet seat with rails  Toilet Transfers Comments: use of grab bar  Assessment        SLP  Current Diet : Regular  Current Liquid Diet : Thin  Diet Solids Recommendation: Regular(Pt able to select soft food items she knows she can tolerate without lower dentures in place)  Liquid Consistency Recommendation: Thin    Body mass index is 26.23 kg/m². Assessment:  Patient Active Problem List   Diagnosis    Paroxysmal atrial fibrillation (HCC)    Closed fracture of lateral portion of right tibial plateau    Chronic anticoagulation    Closed fracture of upper end of right fibula    Fall on stairs    Closed fracture of right tibial plateau       Plan:   Right tibial plateau fracture: NWB in immobilizer ~10 weeks. Pain control. PT/OT     Proximal right fibular fracture: as above    NATHAN: Patient refused IVF, Cr much improved with increased po fluid intake     Afib: eliquis     HTN: norvasc 10mg daily     HLD: lipitor 40     DM: glipizide 5, tradjenta 5, SSI    Right calf pain: negative DVT US    Hyponatremia - follow w/ routine labs on Monday.      Bowels: Per protocol  Bladder: Per protocol  Sleep: Trazodone provided prn.    Pain: tylenol, braxton PRN   DVT PPx: ara Barriga, APRN - CNP   6/28/2020, 3:29 PM

## 2020-06-29 LAB
ANION GAP SERPL CALCULATED.3IONS-SCNC: 8 MMOL/L (ref 3–16)
BUN BLDV-MCNC: 20 MG/DL (ref 7–20)
CALCIUM SERPL-MCNC: 8.8 MG/DL (ref 8.3–10.6)
CHLORIDE BLD-SCNC: 109 MMOL/L (ref 99–110)
CO2: 21 MMOL/L (ref 21–32)
CREAT SERPL-MCNC: 1.1 MG/DL (ref 0.6–1.2)
GFR AFRICAN AMERICAN: 57
GFR NON-AFRICAN AMERICAN: 47
GLUCOSE BLD-MCNC: 180 MG/DL (ref 70–99)
GLUCOSE BLD-MCNC: 205 MG/DL (ref 70–99)
GLUCOSE BLD-MCNC: 223 MG/DL (ref 70–99)
GLUCOSE BLD-MCNC: 87 MG/DL (ref 70–99)
GLUCOSE BLD-MCNC: 90 MG/DL (ref 70–99)
HCT VFR BLD CALC: 27 % (ref 36–48)
HEMOGLOBIN: 8.5 G/DL (ref 12–16)
MCH RBC QN AUTO: 24.4 PG (ref 26–34)
MCHC RBC AUTO-ENTMCNC: 31.5 G/DL (ref 31–36)
MCV RBC AUTO: 77.5 FL (ref 80–100)
PDW BLD-RTO: 17.4 % (ref 12.4–15.4)
PERFORMED ON: ABNORMAL
PERFORMED ON: NORMAL
PLATELET # BLD: 309 K/UL (ref 135–450)
PMV BLD AUTO: 8.3 FL (ref 5–10.5)
POTASSIUM SERPL-SCNC: 4.3 MMOL/L (ref 3.5–5.1)
RBC # BLD: 3.48 M/UL (ref 4–5.2)
SODIUM BLD-SCNC: 138 MMOL/L (ref 136–145)
WBC # BLD: 5.4 K/UL (ref 4–11)

## 2020-06-29 PROCEDURE — 97530 THERAPEUTIC ACTIVITIES: CPT

## 2020-06-29 PROCEDURE — 97110 THERAPEUTIC EXERCISES: CPT

## 2020-06-29 PROCEDURE — 36415 COLL VENOUS BLD VENIPUNCTURE: CPT

## 2020-06-29 PROCEDURE — 85027 COMPLETE CBC AUTOMATED: CPT

## 2020-06-29 PROCEDURE — 6370000000 HC RX 637 (ALT 250 FOR IP): Performed by: NURSE PRACTITIONER

## 2020-06-29 PROCEDURE — 6370000000 HC RX 637 (ALT 250 FOR IP): Performed by: PHYSICAL MEDICINE & REHABILITATION

## 2020-06-29 PROCEDURE — 80048 BASIC METABOLIC PNL TOTAL CA: CPT

## 2020-06-29 PROCEDURE — 97535 SELF CARE MNGMENT TRAINING: CPT

## 2020-06-29 PROCEDURE — 1280000000 HC REHAB R&B

## 2020-06-29 RX ADMIN — SENNOSIDES 17.2 MG: 8.6 TABLET, FILM COATED ORAL at 20:42

## 2020-06-29 RX ADMIN — INSULIN LISPRO 2 UNITS: 100 INJECTION, SOLUTION INTRAVENOUS; SUBCUTANEOUS at 12:14

## 2020-06-29 RX ADMIN — GLIPIZIDE 5 MG: 5 TABLET ORAL at 05:33

## 2020-06-29 RX ADMIN — SENNOSIDES 17.2 MG: 8.6 TABLET, FILM COATED ORAL at 10:15

## 2020-06-29 RX ADMIN — LINAGLIPTIN 5 MG: 5 TABLET, FILM COATED ORAL at 10:15

## 2020-06-29 RX ADMIN — APIXABAN 2.5 MG: 2.5 TABLET, FILM COATED ORAL at 20:42

## 2020-06-29 RX ADMIN — LATANOPROST 1 DROP: 50 SOLUTION OPHTHALMIC at 00:02

## 2020-06-29 RX ADMIN — LATANOPROST 1 DROP: 50 SOLUTION OPHTHALMIC at 20:45

## 2020-06-29 RX ADMIN — DOCUSATE SODIUM 100 MG: 100 CAPSULE ORAL at 10:15

## 2020-06-29 RX ADMIN — INSULIN LISPRO 1 UNITS: 100 INJECTION, SOLUTION INTRAVENOUS; SUBCUTANEOUS at 20:46

## 2020-06-29 RX ADMIN — OXYCODONE 10 MG: 5 TABLET ORAL at 20:44

## 2020-06-29 RX ADMIN — OXYCODONE 5 MG: 5 TABLET ORAL at 14:12

## 2020-06-29 RX ADMIN — APIXABAN 2.5 MG: 2.5 TABLET, FILM COATED ORAL at 10:15

## 2020-06-29 RX ADMIN — TRAZODONE HYDROCHLORIDE 50 MG: 50 TABLET ORAL at 23:52

## 2020-06-29 RX ADMIN — INSULIN LISPRO 1 UNITS: 100 INJECTION, SOLUTION INTRAVENOUS; SUBCUTANEOUS at 18:03

## 2020-06-29 RX ADMIN — DESMOPRESSIN ACETATE 40 MG: 0.2 TABLET ORAL at 10:15

## 2020-06-29 RX ADMIN — AMLODIPINE BESYLATE 10 MG: 5 TABLET ORAL at 10:15

## 2020-06-29 ASSESSMENT — PAIN SCALES - GENERAL
PAINLEVEL_OUTOF10: 5
PAINLEVEL_OUTOF10: 5
PAINLEVEL_OUTOF10: 0
PAINLEVEL_OUTOF10: 6

## 2020-06-29 ASSESSMENT — PAIN DESCRIPTION - LOCATION: LOCATION: LEG

## 2020-06-29 ASSESSMENT — PAIN DESCRIPTION - PROGRESSION
CLINICAL_PROGRESSION: NOT CHANGED

## 2020-06-29 ASSESSMENT — PAIN DESCRIPTION - DESCRIPTORS: DESCRIPTORS: SHARP

## 2020-06-29 ASSESSMENT — PAIN DESCRIPTION - ORIENTATION: ORIENTATION: RIGHT

## 2020-06-29 ASSESSMENT — PAIN DESCRIPTION - DIRECTION: RADIATING_TOWARDS: UP THE LEG

## 2020-06-29 ASSESSMENT — PAIN DESCRIPTION - FREQUENCY: FREQUENCY: INTERMITTENT

## 2020-06-29 NOTE — CARE COORDINATION
Spoke with Aissatou Rangel at [facility] regarding wheelchair order, he will deliver it to pt's room tomorrow.  Britt Ulloa, MSW, LSW

## 2020-06-29 NOTE — PROGRESS NOTES
Physical Therapy  Facility/Department: [facility] ACUTE REHAB UNIT  Daily Treatment Note  NAME: Candi Patino  : 1933  MRN: [id number]    Date of Service: 2020    Discharge Recommendations:  Home with Home health PT, 24 hour supervision or assist, S Level 3   PT Equipment Recommendations  Equipment Needed: Yes  Mobility Devices: Wheelchair  Wheelchair: Light Weight  Other: 18\" manual w/c with cushion and elevating leg rests    Assessment   Body structures, Functions, Activity limitations: Decreased functional mobility ; Decreased ROM; Decreased strength;Decreased balance;Decreased endurance; Increased pain;Decreased ADL status; Decreased safe awareness  Assessment: Patient continues to need CGA for transfers and pre-gait exercises in paralle bars. Improved tolerance of upright mobility this date, but continues to fatigue quickly. Continue to recommend skilled PT to address above deficits  Prognosis: Good  PT Education: Goals;Transfer Training;Functional Mobility Training;General Safety; Energy Conservation;Gait Training;PT Role;Plan of Care;Weight-bearing Education  Patient Education: w/c part management. w/c based transfer set up.  pt verbalized understanding, will continue to reinforce  Barriers to Learning: hearing  REQUIRES PT FOLLOW UP: Yes  Activity Tolerance  Activity Tolerance: Patient limited by fatigue  Activity Tolerance: Pt required frequent seated rest breaks throughout session. Patient Diagnosis(es): There were no encounter diagnoses. has a past medical history of Arthritis, Diabetes mellitus ([address] Hyperlipidemia, Hypertension, and Paroxysmal atrial fibrillation ([facility] [address] has a past surgical history that includes Cholecystectomy ().     Restrictions  Restrictions/Precautions  Restrictions/Precautions: Weight Bearing  Required Braces or Orthoses?: Yes  Lower Extremity Weight Bearing Restrictions  Right Lower Extremity Weight Bearing: Non Weight Bearing  Required Braces or day: Daily  Current Treatment Recommendations: ROM, Balance Training, Functional Mobility Training, Transfer Training, Neuromuscular Re-education, Endurance Training, Safety Education & Training, Gait Training, Equipment Evaluation, Education, & procurement, Patient/Caregiver Education & Training, Stair training, Home Exercise Program, ADL/Self-care Training, Strengthening, Pain Management, Modalities  Safety Devices  Type of devices:  All fall risk precautions in place, Call light within reach, Left in chair, Chair alarm in place, Gait belt  Restraints  Initially in place: No     Therapy Time   Individual Concurrent Group Co-treatment   Time In 1245         Time Out 1330         Minutes 45         Timed Code Treatment Minutes: 45 [address] Time:   Individual Concurrent Group Co-treatment   Time In [address] [facility]         Minutes 45           Timed Code Treatment Minutes:  45    Total Treatment Minutes:  [address], PT    Thanks, Salinas Johnson, PT, DPT [id number]

## 2020-06-29 NOTE — PLAN OF CARE
Problem: Falls - Risk of:  Goal: Will remain free from falls  Description: Will remain free from falls  6/29/2020 1049 by Ramon Marsh RN  Outcome: Ongoing  6/28/2020 2323 by Kodak Mtz RN  Outcome: Ongoing  Goal: Absence of physical injury  Description: Absence of physical injury  6/29/2020 1049 by Ramon Marsh RN  Outcome: Ongoing  6/28/2020 2323 by Kodak Mtz RN  Outcome: Ongoing     Problem: Pain:  Description: Pain management should include both nonpharmacologic and pharmacologic interventions.   Goal: Pain level will decrease  Description: Pain level will decrease  6/29/2020 1049 by Ramon Marsh RN  Outcome: Ongoing  6/28/2020 2323 by Kodak Mtz RN  Outcome: Ongoing  Goal: Control of acute pain  Description: Control of acute pain  6/29/2020 1049 by Ramon Marsh RN  Outcome: Ongoing  6/28/2020 2323 by Kodak Mtz RN  Outcome: Ongoing  Goal: Control of chronic pain  Description: Control of chronic pain  6/29/2020 1049 by Ramon Marsh RN  Outcome: Ongoing  6/28/2020 2323 by Kodak Mtz RN  Outcome: Ongoing     Problem: IP BLADDER/VOIDING  Goal: LTG - patient will achieve acceptable level of continence  6/29/2020 1049 by Ramon Marsh RN  Outcome: Ongoing  6/28/2020 2323 by Kodak Mtz RN  Outcome: Ongoing     Problem: IP BOWEL ELIMINATION  Goal: LTG - patient will have regular and routine bowel evacuation  6/29/2020 1049 by Ramon Marsh RN  Outcome: Ongoing  6/28/2020 2323 by Kodak Mtz RN  Outcome: Ongoing     Problem: SKIN INTEGRITY  Goal: STG - patient will maintain good skin integrity  6/29/2020 1049 by Ramon Marsh RN  Outcome: Ongoing  6/28/2020 2323 by Kodak Mtz RN  Outcome: Ongoing     Problem: Nutrition  Goal: Optimal nutrition therapy  6/29/2020 1049 by Ramon Marsh RN  Outcome: Ongoing  6/28/2020 2323 by Kodak Mtz RN  Outcome: Ongoing

## 2020-06-29 NOTE — PATIENT CARE CONFERENCE
[facility]  Inpatient Rehabilitation  Weekly Team Conference Note    Patient Name: Jina Gardner        MRN: [id number]    : 1933  (80 y.o.)  Gender: female   Referring Practitioner: Dr. Brittany Velasquez  Diagnosis: fall, tibial plateau and fibular head fractures    The team conference for this patient was held on 20 at 11:00am by:  Greg Selby MD    CASE MANAGEMENT:  Assessment: Patient lives at home w/spouse- stated she plays dominoes and cards, no volunteer work. PSYCHOLOGY:  Assessment:     PHYSICAL THERAPY:    Bed Mobility:   Scooting: Supervision    Transfers:  Sit to Stand: Contact guard assistance, Minimal Assistance(CGA from recliner and from w/c in // bars 2x; Min A from w/c in // bars 2x)  Stand to sit: Contact guard assistance  Bed to Chair: 2 Person Assistance(Max A x 2 )  Squat Pivot Transfers: 2 Person Assistance(bed => w/c at max (A) of 1 + CGA, w/c => recliner at max (A) of 2)  Comment: 1st session: Pt performed transfers as documented above. Pt performed STS in // bars and performed pre-gait tasks of single leg squats and TR on LLE, hopping in place, and forward/retro hop in // bars all with CGA for balance. Pt returned to room via w/c and pivoted back to recliner with CGA. Pt left seated in chair with all needs in reach and alarm engaged.                QM:  Roll Left and Right  Assistance Needed: Supervision or touching assistance  CARE Score: 4  Discharge Goal: Independent  Sit to Lying  Assistance Needed: Supervision or touching assistance  CARE Score: 4  Discharge Goal: Independent  Lying to Sitting on Side of Bed  Assistance Needed: Supervision or touching assistance  Physical Assistance Level: No physical assistance  CARE Score: 4  Discharge Goal: Independent  Sit to Stand  Assistance Needed: Supervision or touching assistance  CARE Score: 4  Discharge Goal: Independent  Chair/Bed-to-Chair Transfer  Assistance Needed: Supervision or touching assistance  CARE Score: 4  Discharge Goal: Supervision or touching assistance  Car Transfer  Assistance Needed: Supervision or touching assistance  Reason if not Attempted: Not attempted due to medical condition or safety concerns  CARE Score: 4  Discharge Goal: Supervision or touching assistance  Walk 10 Feet  Reason if not Attempted: Not attempted due to medical condition or safety concerns  CARE Score: 88  Discharge Goal: Partial/moderate assistance  Walk 50 Feet with Two Turns  Reason if not Attempted: Not attempted due to medical condition or safety concerns  CARE Score: 88  Discharge Goal: Dependent  Walk 150 Feet  Reason if not Attempted: Not attempted due to medical condition or safety concerns  CARE Score: 88  Discharge Goal: Dependent  Walking 10 Feet on Uneven Surfaces  Reason if not Attempted: Not attempted due to medical condition or safety concerns  CARE Score: 88  1 Step (Curb)  Reason if not Attempted: Not attempted due to medical condition or safety concerns  CARE Score: 88  Discharge Goal: Partial/moderate assistance  4 Steps  Reason if not Attempted: Not attempted due to medical condition or safety concerns  CARE Score: 88  Discharge Goal: Partial/moderate assistance  12 Steps  Reason if not Attempted: Not attempted due to medical condition or safety concerns  CARE Score: 88  Discharge Goal: Not Applicable  Picking Up Object  Reason if not Attempted: Not attempted due to medical condition or safety concerns  CARE Score: 88  Discharge Goal: Supervision or touching assistance  Wheelchair Ability  Uses a Wheelchair and/or Scooter?: No    SPEECH THERAPY:    Diet Level:DIET CARB CONTROL; Assessment:    Dysphagia Impression : Suspect presbyphagia with mild oropharyngeal deficits related to current age due to suspicion of premature bolus loss and slight swallow delay with occasional audible swallows.  OME revealed slightly reduced lingual ROM/ coordination, which is not negatively impacting swallowing assistance  Toilet Transfers Comments: use of grab bar    Tub/ShowerTransfers:     Shower Transfers  Shower - Transfer From: Wheelchair  Shower - Transfer Type: To and From  Shower - Transfer To: Shower seat with back  Shower - Technique: Stand pivot  Shower Transfers: Contact Guard  Shower Transfers Comments: use of grab bar    QM:  Eating  Assistance Needed: Independent  CARE Score: 6  Discharge Goal: Independent  Oral Hygiene  Assistance Needed: Independent  CARE Score: 6  Discharge Goal: Independent  Toileting Hygiene  Assistance Needed: Supervision or touching assistance  CARE Score: 4  Discharge Goal: Supervision or touching assistance  Toilet Transfer  Assistance Needed: Supervision or touching assistance  CARE Score: 4  Shower/Bathe Self  Assistance Needed: Supervision or touching assistance  CARE Score: 4  Discharge Goal: Supervision or touching assistance  Upper Body Dressing  Assistance Needed: Independent  CARE Score: 6  Discharge Goal: Independent  Lower Body Dressing  Assistance Needed: Partial/moderate assistance  CARE Score: 3  Discharge Goal: Supervision or touching assistance  Putting On/Taking Off Footwear  Assistance Needed: Setup or clean-up assistance(for left sock only)  CARE Score: 5  Discharge Goal: Supervision or touching assistance    NUTRITION:  Weight: 144 lb 14.4 oz (65.7 kg) / Body mass index is 25.67 kg/m². Diet Order:CCC    Supplements:NA    Pt remains nutritionally stable AEB report of good appetite with po intake greater than 50% of meals. Pt expresses no nutrition concerns @ this time. Please see nutrition note for details.     NURSING:  FIMS:       Agata Fernandez Fall Risk Score: High  Wounds/Incisions/Ulcers: None  Medication Review: Reviewed daily with patient  Pain: Occasional pain managed with pain medication  Consultations/Labs/X-rays: Labs Monday and Thursday          Risk for Readmission: 17%  Critical Items: If High Risk, consider the following recommendations:Home Health Nursing    Patient/Family Education provided by team:    Discharge Plan   Estimated Length of Stay: 1 day  Destination: home health  Pass:No  Services at Discharge: [address] at Discharge: TTB, Pt owns hip kit and raised toilet,   Factors facilitating achievement of predicted outcomes: high PLOF, motivation  Barriers to the achievement of predicted outcomes: Dash[address] on R LE  Patient Goals:Improve mobility, \"to return home\" ,     Rehab Team Members in attendance for Team Conference:  Imer Bustillo, MSW, LSW  Audrey Stearns, RD, LD    [place], [place]. D, Neuropsychologist    Anna Young, OTR/L    Frida Oh, PT, CLT, NDT    Rajan 14 RN, BSN    Aquilino Richardson, [address] [place] [address],     I approve the established interdisciplinary plan of care as documented within the medical record of [address] [place] [address].     Gloria Barton MD  Electronically signed by Gloria Barton MD on 6/30/2020 at 11:23 AM

## 2020-06-29 NOTE — DISCHARGE INSTR - COC
Last documented pain score (0-10 scale): Pain Level: 5  Last Weight:   Wt Readings from Last 1 Encounters:   06/29/20 144 lb 14.4 oz (65.7 kg)     Mental Status:  oriented, alert and logical    IV Access:  - None    Nursing Mobility/ADLs: No weight bearing to Rt leg. Wear knee immobilizer when out of bed. Wear wrist support on Rt UE for support. Walking   Dependent  Transfer  Assisted  Bathing  Assisted  Dressing  Assisted  Toileting  Assisted  Feeding  Independent  Med Admin  Independent  Med Delivery   whole    Wound Care Documentation and Therapy: none        Elimination:  Continence:   · Bowel: No  · Bladder: No  Urinary Catheter: None   Colostomy/Ileostomy/Ileal Conduit: No       Date of Last BM: 6/30/20    Intake/Output Summary (Last 24 hours) at 6/29/2020 1556  Last data filed at 6/29/2020 0845  Gross per 24 hour   Intake 360 ml   Output --   Net 360 ml     I/O last 3 completed shifts: In: 360 [P.O.:360]  Out: -     Safety Concerns: At Risk for Falls    Impairments/Disabilities:      None    Nutrition Therapy:  Current Nutrition Therapy:   - Oral Diet:  Carb Control 5 carbs/meal (2000kcals/day)    Routes of Feeding: Oral  Liquids: Thin Liquids  Daily Fluid Restriction: no  Last Modified Barium Swallow with Video (Video Swallowing Test): not done    Treatments at the Time of Hospital Discharge:   Respiratory Treatments: none  Oxygen Therapy:  is not on home oxygen therapy. Ventilator:    - No ventilator support    Rehab Therapies: Physical Therapy and Occupational Therapy  Weight Bearing Status/Restrictions: No wt on Rt LE.   Knee immobilizer when out of bed  Other Medical Equipment (for information only, NOT a DME order):  wheelchair  Other Treatments: none    Patient's personal belongings (please select all that are sent with patient):  Hearing Aides {LEFT/RIGHT/BILATERAL:[id number]}    RN SIGNATURE:  Electronically signed by Elie Pulliam RN on 7/1/20 at 8:35 AM EDT    CASE MANAGEMENT/SOCIAL WORK SECTION    Inpatient Status Date: 6/12/2020    Readmission Risk Assessment Score:  Readmission Risk              Risk of Unplanned Readmission:        17           Discharging to Facility/ Agency   · Name: [facility]   Phone: [phone]         Fax: [phone]        / signature: Electronically signed by NIKOLE Mohr, LSW on 6/29/20 at 3:56 PM EDT    PHYSICIAN SECTION    Prognosis: Fair    Condition at Discharge: Stable    Rehab Potential (if transferring to Rehab): Fair    Recommended Labs or Other Treatments After Discharge: PT/OT/RN/Aide    Physician Certification: I certify the above information and transfer of Celeste Rodriguez  is necessary for the continuing treatment of the diagnosis listed and that she requires Home Care for greater 30 days.      Update Admission H&P: No change in H&P    PHYSICIAN SIGNATURE:  Electronically signed by Dallas Marvin MD on 6/30/20 at 9:40 AM EDT

## 2020-06-29 NOTE — PROGRESS NOTES
Dejah Lomas  6/29/2020  [id number]    Chief Complaint: Closed fracture of right tibial plateau    Subjective:   No overnight events. No current complaints. Labs reviewed. Glucose low this a.m.    ROS: No CP, SOB, dyspnea    Objective:  Patient Vitals for the past 24 hrs:   BP Temp Temp src Pulse Resp Weight   06/29/20 0530 -- -- -- -- -- 144 lb 14.4 oz (65.7 kg)   06/28/20 2000 (!) 168/72 98.3 °F (36.8 °C) Oral 68 18 --     Gen: No distress, pleasant. Resting in bed  HEENT: Normocephalic, atraumatic  CV: Regular rate and rhythm. No MRG   Resp: No respiratory distress. CTAB   Abd: Soft, nontender nondistended  Ext: no BLE edema. RLE in immobilizer  Neuro: Alert, oriented, appropriately interactive. Laboratory data: Available via EMR. Therapy progress:  PT  Required Braces or Orthoses  Right Lower Extremity Brace: Knee Immobilizer  Right Upper Extremity Brace/Splint: Pt wears R UE splint for support w/WB; broke wrist 4/2019  Position Activity Restriction  Other position/activity restrictions: 80-year-old female with a history of diabetes, hyperlipidemia, hypertension, and A. fib on [facility] who was admitted on 6/10 with right knee pain after falling down a few stairs. X-ray revealed a comminuted and mildly depressed fracture involving the lateral tibial plateau as well as a proximal fracture of the fibular head. Ortho evaluated and suggested conservative management with nonweightbearing in the right lower extremity. Therapy evaluated and suggested she continue in an inpatient setting prior to returning home. She reports her pain is controlled. She lives in a quad level home with multiple half staircases. Objective     Sit to Stand: Contact guard assistance, Minimal Assistance(CGA from recliner and from w/c in // bars 2x;  Min A from w/c in // bars 2x)  Stand to sit: Contact guard assistance  Bed to Chair: 2 Person Assistance(Max A x 2 )     OT  PT Equipment Recommendations  Equipment Needed: Yes  Mobility Devices: Wheelchair  Wheelchair: Light Weight  Other: 18\" manual w/c with cushion and elevating leg rests  Toilet - Technique: Stand pivot  Equipment Used: Raised toilet seat with rails  Toilet Transfers Comments: use of grab bar  Assessment        SLP  Current Diet : Regular  Current Liquid Diet : Thin  Diet Solids Recommendation: Regular(Pt able to select soft food items she knows she can tolerate without lower dentures in place)  Liquid Consistency Recommendation: Thin    Body mass index is 25.67 kg/m². Assessment:  Patient Active Problem List   Diagnosis    Paroxysmal atrial fibrillation (HCC)    Closed fracture of lateral portion of right tibial plateau    Chronic anticoagulation    Closed fracture of upper end of right fibula    Fall on stairs    Closed fracture of right tibial plateau       Plan:   Right tibial plateau fracture: NWB in immobilizer ~10 weeks. Pain control. PT/OT     Proximal right fibular fracture: as above    NATHAN: Patient refused IVF, Cr much improved with increased po fluid intake     Afib: eliquis     HTN: norvasc 10mg daily     HLD: lipitor 40     DM: glipizide 5, tradjenta 5, SSI    Right calf pain: negative DVT US    Hyponatremia: - resolved     Bowels: Per protocol  Bladder: Per protocol  Sleep: Trazodone provided prn.    Pain: tylenol, braxton PRN   DVT PPx: ara Hughes MD   6/29/2020, 9:59 AM

## 2020-06-29 NOTE — PLAN OF CARE
Problem: Falls - Risk of:  Goal: Will remain free from falls  Description: Will remain free from falls  6/28/2020 2323 by Parvin Brewster RN  Outcome: Ongoing  6/28/2020 2011 by Parvin Brewster RN  Outcome: Ongoing  6/28/2020 1947 by Emre Ortega RN  Outcome: Ongoing     Problem: Falls - Risk of:  Goal: Absence of physical injury  Description: Absence of physical injury  6/28/2020 2323 by Parvin Brewster RN  Outcome: Ongoing  6/28/2020 2011 by Parvin Brewster RN  Outcome: Ongoing  6/28/2020 1947 by Emre Ortega RN  Outcome: Ongoing     Problem: Pain:  Goal: Pain level will decrease  Description: Pain level will decrease  6/28/2020 2323 by Parvin Brewster RN  Outcome: Ongoing  6/28/2020 2011 by Parvin Brewster RN  Outcome: Ongoing  6/28/2020 1947 by Emre Ortega RN  Outcome: Ongoing     Problem: Pain:  Goal: Control of acute pain  Description: Control of acute pain  6/28/2020 2323 by Parvin Brewster RN  Outcome: Ongoing  6/28/2020 2011 by Parvin Brewster RN  Outcome: Ongoing  6/28/2020 1947 by Emre Ortega RN  Outcome: Ongoing

## 2020-06-29 NOTE — PROGRESS NOTES
Occupational Therapy  Facility/Department: [facility] ACUTE REHAB UNIT  Daily Treatment Note  NAME: Tawanda Coleman  : 1933  MRN: [id number]    Date of Service: 2020    Discharge Recommendations:  Home with Home health OT, S Level 3, Home with assist PRN  OT Equipment Recommendations  Equipment Needed: Yes  Mobility Devices: ADL Assistive Devices  ADL Assistive Devices: Transfer Tub Bench  Other: has raised toilet seat, hip kit, will likely require w/c for mobility     Assessment   Performance deficits / Impairments: Decreased functional mobility ; Decreased balance;Decreased ADL status; Decreased endurance;Decreased high-level IADLs  Assessment: Pt is not at her baseline of occupational function. Pt continues to require CGA for functional transfers (stand pivot). Pt requires Min A for LB dressing to pull up shorts and plans at d/c home to wear moo-moo and brief for eaier LB dressing. Pt would benefit from continued intensive skilled acute OT services to addressed these deficits. Continue with POC. Treatment Diagnosis: Decreased ADL/IADL status, functional mobility, endurance and balance associated with R lateral tibia plateau fx  Prognosis: Good  OT Education: OT Role;Plan of Care;ADL Adaptive Strategies  Patient Education: pt demonstrated understanding  REQUIRES OT FOLLOW UP: Yes  Activity Tolerance  Activity Tolerance: Patient Tolerated treatment well  Safety Devices  Safety Devices in place: Yes  Type of devices: Left in chair;Call light within reach; Chair alarm in place; All fall risk precautions in place         Patient Diagnosis(es): tibial fx     has a past medical history of Arthritis, Diabetes mellitus ([address] Hyperlipidemia, Hypertension, and Paroxysmal atrial fibrillation ([facility] [address] has a past surgical history that includes Cholecystectomy ().     Restrictions  Restrictions/Precautions  Restrictions/Precautions: Weight Bearing  Required Braces or Orthoses?: Yes  Lower Extremity Weight Bearing Restrictions  Right Lower Extremity Weight Bearing: Non Weight Bearing  Required Braces or Orthoses  Right Lower Extremity Brace: Knee Immobilizer  Right Upper Extremity Brace/Splint: Pt wears R UE splint for support w/WB; broke wrist 4/2019  Position Activity Restriction  Other position/activity restrictions: 43-year-old female with a history of diabetes, hyperlipidemia, hypertension, and A. fib on Eliquis who was admitted on 6/10 with right knee pain after falling down a few stairs. X-ray revealed a comminuted and mildly depressed fracture involving the lateral tibial plateau as well as a proximal fracture of the fibular head. Ortho evaluated and suggested conservative management with nonweightbearing in the right lower extremity. Therapy evaluated and suggested she continue in an inpatient setting prior to returning home. She reports her pain is controlled. She lives in a quad level home with multiple half staircases. Subjective   General  Chart Reviewed: Yes  Patient assessed for rehabilitation services?: Yes  Response to previous treatment: Patient with no complaints from previous session  Family / Caregiver Present: No  Referring Practitioner: Daryle Brilliant, DO, for d/c planning  Diagnosis: R lateral tibia plateau fx  Subjective  Subjective: Pt supine in bed at entry, agreeable to OT session and shower   General Comment  Comments: Pt in wc Mod I to closet to gather clothing and back to bathroom. Pt stand pivot shower transfer CGA. Pt bathed UB (Mod I) and LB (CGA in stance to dry buttocks). Pt dressed UB - bra/shirt (Independent) and LB - sock/shorts/brief (setup/Min A to pull up shorts. Pt seated in wc at sink groomed (oral care/hair) at Independent. Pt stand pivot [facility] to recliner. Call light in reach, chair alarm on. and RN present.   Vital Signs  Patient Currently in Pain: Denies          Objective    ADL  Equipment Provided: Sock aid;Reacher  Grooming: Independent  UE Bathing: Modified independent LE Bathing: Supervision;Setup  UE Dressing: Independent  LE Dressing: Setup; Increased time to complete;Minimal assistance(assist for R sock management and CGA for stance for clothing management over hips)  Toileting: Contact guard assistance(CGA to doff undergarments, pt able to complete adelia hygiene w/ set-up)  Additional Comments: UB/LB bathing and dressing completed seated on shower chair, use of AE as needed and increased time to complete ADLs. Pt requires assist for KI management, R LE/KI water-proofed for shower; tensoshapes applied to B LE post shower to address edema        Balance  Sitting Balance: Modified independent   Standing Balance: Contact guard assistance  Standing Balance  Time: ~3 min total  Activity: functional transfers and ADL completion  Functional Mobility  Functional - Mobility Device: Wheelchair  Activity: To/from bathroom; Other  Assist Level: Modified independent   Functional Mobility Comments: to/from bathroom for ADL completion; pt completed ~280ft of w/c mobility w/ B UE in hallway (including navigating tight turns) to address strength and endurance, 2 rest breaks required throughout and increased time to complete  Toilet Transfers  Toilet - Technique: Stand pivot  Equipment Used: Raised toilet seat with rails  Toilet Transfer: Contact guard assistance  Toilet Transfers Comments: use of grab bar  Shower Transfers  Shower - Transfer From: Wheelchair  Shower - Transfer Type: To and From  Shower - Transfer To: Shower seat with back  Shower - Technique: Stand pivot  Shower Transfers: Contact Guard  Shower Transfers Comments: use of grab bar  Bed mobility  Supine to Sit: Supervision  Scooting: Supervision  Comment: use of bed rail as needed, HOB elevated  Transfers  Sit to stand: Contact guard assistance  Stand to sit: Contact guard assistance         Second Session:  Pt seated in recliner at entry, agreeable to OT session. Pt denied pain.  SPT recliner<>w/c and w/c<> commode w/ CGA this session, min verbal cues for technique. Pt completed toileting w/ Jennifer for clothing management (secondary to fatigue during management of shorts) and SUP for adelia hygiene. Hand washing w/c level w/ Favian. Pt completed dynamic standing tasks this session to address activity tolerance, unilateral reaching and balance for functional activities such as LB dressing: pt completed SPT w/c<>armed chair at ballet bars, during stances pt remove 5 clothespins placed on pants; pt then erased 10 numbers written on mirror in numerical order, 2 reps of activity, CGA for sit<>stands and transfers throughout, cues for effective hand placement during reaching for increased support/safety. Pt left seated in recliner at end of session, chair alarm on, call light and needs within reach.          Plan   Plan  Times per week: 90 minutes 5 days per week   Times per day: Daily  Specific instructions for Next Treatment:    Current Treatment Recommendations: Functional Mobility Training, Safety Education & Training, Balance Training, Self-Care / ADL, Endurance Training, Wheelchair Mobility Training, Patient/Caregiver Education & Training       Goals  Short term goals  Time Frame for Short term goals: 3 weeks   Short term goal 1: Mod I functional transfers to toilet and shower   Short term goal 2: mod I UB bathing/dressing - goal met 6/25  Short term goal 3: min A LB bathing/dressing - goal met bathing 6/22, goal met dressing 6/26  Short term goal 4: min A toileting - goal met 6/22  Short term goal 5: mod I functional mobility from w/c level for ADLs/IADLs - goal met 6/24  Long term goals  Long term goal 1: NEW GOAL 6/29: Supervision for LB dressing w/ AE as needed  Long term goal 2: NEW Goal 6/29: Supervision for toileting   Patient Goals   Patient goals : \"to return home\"        Therapy Time   Individual Concurrent Group Co-treatment   Time In 0830, 1115         Time Out 0930, 1145         Minutes 60, 30              Timed Code Treatment Minutes:  60 + 30  Total Treatment Minutes:  90 min total      Jose Guadalupe Pruett, [facility], OTR/L #[id number]

## 2020-06-30 LAB
GLUCOSE BLD-MCNC: 121 MG/DL (ref 70–99)
GLUCOSE BLD-MCNC: 181 MG/DL (ref 70–99)
GLUCOSE BLD-MCNC: 193 MG/DL (ref 70–99)
GLUCOSE BLD-MCNC: 199 MG/DL (ref 70–99)
PERFORMED ON: ABNORMAL

## 2020-06-30 PROCEDURE — 6370000000 HC RX 637 (ALT 250 FOR IP): Performed by: PHYSICAL MEDICINE & REHABILITATION

## 2020-06-30 PROCEDURE — 1280000000 HC REHAB R&B

## 2020-06-30 PROCEDURE — 97530 THERAPEUTIC ACTIVITIES: CPT

## 2020-06-30 PROCEDURE — 97110 THERAPEUTIC EXERCISES: CPT

## 2020-06-30 PROCEDURE — 97535 SELF CARE MNGMENT TRAINING: CPT

## 2020-06-30 PROCEDURE — 6370000000 HC RX 637 (ALT 250 FOR IP): Performed by: NURSE PRACTITIONER

## 2020-06-30 RX ORDER — INSULIN LISPRO 100 [IU]/ML
0-12 INJECTION, SOLUTION INTRAVENOUS; SUBCUTANEOUS
Status: DISCONTINUED | OUTPATIENT
Start: 2020-06-30 | End: 2020-07-01 | Stop reason: HOSPADM

## 2020-06-30 RX ORDER — SENNA PLUS 8.6 MG/1
2 TABLET ORAL 2 TIMES DAILY
Qty: 120 TABLET | Refills: 1 | Status: SHIPPED | OUTPATIENT
Start: 2020-06-30 | End: 2020-07-30

## 2020-06-30 RX ORDER — OXYCODONE HYDROCHLORIDE 5 MG/1
5-10 TABLET ORAL EVERY 4 HOURS PRN
Qty: 40 TABLET | Refills: 0 | Status: SHIPPED | OUTPATIENT
Start: 2020-06-30 | End: 2020-07-07

## 2020-06-30 RX ORDER — INSULIN LISPRO 100 [IU]/ML
0-6 INJECTION, SOLUTION INTRAVENOUS; SUBCUTANEOUS NIGHTLY
Status: DISCONTINUED | OUTPATIENT
Start: 2020-06-30 | End: 2020-07-01 | Stop reason: HOSPADM

## 2020-06-30 RX ORDER — ALOGLIPTIN 12.5 MG/1
12.5 TABLET, FILM COATED ORAL DAILY
Status: DISCONTINUED | OUTPATIENT
Start: 2020-06-30 | End: 2020-07-01 | Stop reason: HOSPADM

## 2020-06-30 RX ADMIN — OXYCODONE 10 MG: 5 TABLET ORAL at 21:10

## 2020-06-30 RX ADMIN — INSULIN LISPRO 2 UNITS: 100 INJECTION, SOLUTION INTRAVENOUS; SUBCUTANEOUS at 12:40

## 2020-06-30 RX ADMIN — LATANOPROST 1 DROP: 50 SOLUTION OPHTHALMIC at 21:14

## 2020-06-30 RX ADMIN — OXYCODONE 10 MG: 5 TABLET ORAL at 15:15

## 2020-06-30 RX ADMIN — DOCUSATE SODIUM 100 MG: 100 CAPSULE ORAL at 09:24

## 2020-06-30 RX ADMIN — INSULIN LISPRO 1 UNITS: 100 INJECTION, SOLUTION INTRAVENOUS; SUBCUTANEOUS at 21:12

## 2020-06-30 RX ADMIN — AMLODIPINE BESYLATE 10 MG: 5 TABLET ORAL at 09:23

## 2020-06-30 RX ADMIN — SENNOSIDES 17.2 MG: 8.6 TABLET, FILM COATED ORAL at 21:10

## 2020-06-30 RX ADMIN — DESMOPRESSIN ACETATE 40 MG: 0.2 TABLET ORAL at 09:23

## 2020-06-30 RX ADMIN — APIXABAN 2.5 MG: 2.5 TABLET, FILM COATED ORAL at 09:23

## 2020-06-30 RX ADMIN — TRAZODONE HYDROCHLORIDE 50 MG: 50 TABLET ORAL at 21:10

## 2020-06-30 RX ADMIN — GLIPIZIDE 5 MG: 5 TABLET ORAL at 06:19

## 2020-06-30 RX ADMIN — INSULIN LISPRO 2 UNITS: 100 INJECTION, SOLUTION INTRAVENOUS; SUBCUTANEOUS at 17:38

## 2020-06-30 RX ADMIN — ALOGLIPTIN 12.5 MG: 12.5 TABLET, FILM COATED ORAL at 12:40

## 2020-06-30 RX ADMIN — APIXABAN 2.5 MG: 2.5 TABLET, FILM COATED ORAL at 21:10

## 2020-06-30 RX ADMIN — SENNOSIDES 17.2 MG: 8.6 TABLET, FILM COATED ORAL at 09:23

## 2020-06-30 ASSESSMENT — PAIN DESCRIPTION - ORIENTATION
ORIENTATION: RIGHT
ORIENTATION: RIGHT

## 2020-06-30 ASSESSMENT — PAIN DESCRIPTION - LOCATION
LOCATION: KNEE
LOCATION: KNEE

## 2020-06-30 ASSESSMENT — PAIN DESCRIPTION - FREQUENCY: FREQUENCY: INTERMITTENT

## 2020-06-30 ASSESSMENT — PAIN SCALES - GENERAL
PAINLEVEL_OUTOF10: 7
PAINLEVEL_OUTOF10: 0
PAINLEVEL_OUTOF10: 0
PAINLEVEL_OUTOF10: 7

## 2020-06-30 ASSESSMENT — PAIN DESCRIPTION - DESCRIPTORS: DESCRIPTORS: ACHING

## 2020-06-30 ASSESSMENT — PAIN DESCRIPTION - ONSET: ONSET: ON-GOING

## 2020-06-30 NOTE — PROGRESS NOTES
Physical Therapy  Facility/Department: [facility] ACUTE REHAB UNIT  Daily Treatment Note/Discharge Summary   NAME: Iron Cerda  : 1933  MRN: [id number]    Date of Service: 2020    Discharge Recommendations:  Home with Home health PT, 24 hour supervision or assist, S Level 3   PT Equipment Recommendations  Equipment Needed: Yes  Mobility Devices: Wheelchair  Wheelchair: Light Weight  Other: 18\" manual w/c with cushion and elevating leg rests    Assessment   Body structures, Functions, Activity limitations: Decreased functional mobility ; Decreased ROM; Decreased strength;Decreased balance;Decreased endurance; Increased pain;Decreased ADL status; Decreased safe awareness  Assessment: Goals partially met. Pt able to function at  level manuevering with modified independence. Needing CGA for transfers from lower surfaces and for safely pivoting while maintaining NWB status. Continued skilled PT at discharge to promote return to PLOF. Treatment Diagnosis: Impaired balance, impaired functional mobility  Prognosis: Good  PT Education: Goals;Transfer Training;Functional Mobility Training;General Safety; Energy Conservation;Gait Training;PT Role;Plan of Care;Weight-bearing Education  Patient Education: w/c part management. w/c based transfer set up.  pt verbalized understanding, will continue to reinforce  Barriers to Learning: hearing  REQUIRES PT FOLLOW UP: Yes  Activity Tolerance  Activity Tolerance: Patient Tolerated treatment well     Patient Diagnosis(es): The encounter diagnosis was Closed fracture of right tibial plateau with routine healing, subsequent encounter. has a past medical history of Arthritis, Diabetes mellitus ([facility] [address] Hyperlipidemia, Hypertension, and Paroxysmal atrial fibrillation ([facility] [address] has a past surgical history that includes Cholecystectomy ().     Restrictions  Restrictions/Precautions  Restrictions/Precautions: Weight Bearing  Required Braces or Orthoses?: Yes  Lower Extremity Weight Bearing Restrictions  Right Lower Extremity Weight Bearing: Non Weight Bearing  Required Braces or Orthoses  Right Lower Extremity Brace: Knee Immobilizer  Right Upper Extremity Brace/Splint: Pt wears R UE splint for support w/WB; broke wrist 4/2019  Position Activity Restriction  Other position/activity restrictions: 59-year-old female with a history of diabetes, hyperlipidemia, hypertension, and A. fib on Eliis who was admitted on 6/10 with right knee pain after falling down a few stairs. X-ray revealed a comminuted and mildly depressed fracture involving the lateral tibial plateau as well as a proximal fracture of the fibular head. Ortho evaluated and suggested conservative management with nonweightbearing in the right lower extremity. Therapy evaluated and suggested she continue in an inpatient setting prior to returning home. She reports her pain is controlled. She lives in a quad level home with multiple half staircases. Subjective   General  Chart Reviewed: Yes  Response To Previous Treatment: Patient with no complaints from previous session.   Family / Caregiver Present: No  Referring Practitioner: Dr. Durga Harris  Subjective  Subjective: denied pain, no new concerns reported   General Comment  Comments: seated in wc at arrival with PCA present after using bathroom  Pain Screening  Patient Currently in Pain: Denies  Vital Signs  Patient Currently in Pain: Denies       Orientation  Orientation  Overall Orientation Status: Within Functional Limits  Cognition      Objective   Bed mobility  Rolling to Left: Modified independent  Rolling to Right: Modified independent  Supine to Sit: Modified independent  Sit to Supine: Modified independent  Scooting: Modified independent  Comment: performed on flat regular bed   Transfers  Sit to Stand: Contact guard assistance(lower surfaces )  Stand to sit: Contact guard assistance(decreased eccentric control)  Bed to Chair: Contact guard assistance  Stand Pivot Transfers: Contact guard assistance(maintained NWB RLE)  Car Transfer: Contact guard assistance(for pivot transfer, supervision for scooting leg into/out of car)  Comment: Pt able to maintain NWB during all transfers without cues. Pt performed wc to/from bed, wc to/from car transfers during session. 5 additional STS transfers from wc level within // bars with CGA, stood ~10 seconds with supervision while maintaining NWB on RLE. Ambulation  Ambulation?: No(unable to safely perform)  Stairs/Curb  Stairs?: No  Wheelchair Activities  Wheelchair Size: 18\"  Wheelchair Type: Standard  Wheelchair Cushion: Pressure Relieving(waffle)  Level of Assistance for pressure relief activities: Modified independent  Wheelchair Parts Management: Yes  Right Leg Rest Level of Assistance: Supervision  Left Brakes Level of Assistance: Independent  Right Brakes Level of Assistance: Independent  Propulsion: Yes  Propulsion 1  Propulsion: Manual  Level: Level Tile  Method: LUE;RUE  Level of Assistance: Modified independent  Description/ Details: pt able to navigate around obstacles, navigate turns, and approach/park wc in preparation for transfer out of   Distance: 200 ft      Balance  Posture: Good  Sitting - Static: Good  Sitting - Dynamic: Good  Standing - Static: Fair  Standing - Dynamic: Fair      2nd session:  Pt seated in wc at arrival, denied pain at rest.  WC mobility 150 ft x 2 using UEs mod I.  SPT wc to/from bed with CGA. Sit to/from supine mod I. Supine therex: modified bridge/glut sq 2x10, RLE SLR with tc 2x10 and hip abduction 2x10, manual gastroc stretch 3x15\". // bars:  STS with CGA x 3 reps. Standing LLE heel raises with limited excursion x 10, attempted hopping in place with minimal clearance x 10, dynamic reaching included cross body x 10 B with 1 UE support (all with CGA to SBA). Pt returned to recliner with SPT and CGA. Alarm on and needs in reach.              Goals  Short term goals  Time Frame for Short term goals: 1 week  Short term goal 1: Supine to/from sit with Min A - goal met 6/19/2020  Short term goal 2: Sit to/from stand with Mod A x 1 - goal met 6/17/2020  Short term goal 3: Propel w/c x 76' with Min A - goal met 6/15/2020  Short term goal 4: Amb x 1 lap in 11 bars with Mod A-- not met   Long term goals  Time Frame for Long term goals : 2 weeks  Long term goal 1: Supine to/from sit with Mod I-- met 6/30  Long term goal 2: Sit to/from stand with Mod I-- not met  Long term goal 3: Bed to/from w/c with Supervision-- not met   Long term goal 4: Amb. x 25' with FWW and Min A-- not met   Long term goal 5: Propel w/c x 150' with Mod I-- met 6/30   Long term goal 6: Car transfer with Supevision-- not met   Long term goal 7: Up and down 4 steps backward with Min A -- not met   Patient Goals   Patient goals : Improve mobility    Plan    Plan  Times per week: 90 minutes/day, 5 days/wk  Times per day: Daily  Current Treatment Recommendations: ROM, Balance Training, Functional Mobility Training, Transfer Training, Neuromuscular Re-education, Endurance Training, Safety Education & Training, Gait Training, Equipment Evaluation, Education, & procurement, Patient/Caregiver Education & Training, Stair training, Home Exercise Program, ADL/Self-care Training, Strengthening, Pain Management, Modalities  Safety Devices  Type of devices:  All fall risk precautions in place, Call light within reach, Left in chair, Chair alarm in place, Gait belt  Restraints  Initially in place: No     Therapy Time   Individual Concurrent Group Co-treatment   Time In 1025         Time Out 1110         Minutes 45         Timed Code Treatment Minutes: [address] [facility] Time:   Individual Concurrent Group Co-treatment   Time In [address] [facility]         Minutes 45           Timed Code Treatment Minutes:  90    Total Treatment Minutes:  [place], [id number]

## 2020-06-30 NOTE — PROGRESS NOTES
Dejah Lomas  6/30/2020  [id number]    Chief Complaint: Closed fracture of right tibial plateau    Subjective:   No overnight events. No current complaints. Reports she was on metformin, tradjenta, invokana, and lantus at home. ROS: No CP, SOB, dyspnea    Objective:  Patient Vitals for the past 24 hrs:   BP Temp Temp src Pulse Resp SpO2 Weight   06/30/20 0530 -- -- -- -- -- -- 147 lb 11.2 oz (67 kg)   06/29/20 2030 (!) 149/64 98.4 °F (36.9 °C) Oral 62 18 99 % --   06/29/20 1015 (!) 155/70 98 °F (36.7 °C) Oral 66 17 99 % --     Gen: No distress, pleasant. Resting in chair  HEENT: Normocephalic, atraumatic  CV: Regular rate and rhythm. No MRG   Resp: No respiratory distress. CTAB   Abd: Soft, nontender nondistended  Ext: no BLE edema. RLE in immobilizer  Neuro: Alert, oriented, appropriately interactive. Laboratory data: Available via EMR. Therapy progress:  PT  Required Braces or Orthoses  Right Lower Extremity Brace: Knee Immobilizer  Right Upper Extremity Brace/Splint: Pt wears R UE splint for support w/WB; broke wrist 4/2019  Position Activity Restriction  Other position/activity restrictions: 80-year-old female with a history of diabetes, hyperlipidemia, hypertension, and A. fib on Eliis who was admitted on 6/10 with right knee pain after falling down a few stairs. X-ray revealed a comminuted and mildly depressed fracture involving the lateral tibial plateau as well as a proximal fracture of the fibular head. Ortho evaluated and suggested conservative management with nonweightbearing in the right lower extremity. Therapy evaluated and suggested she continue in an inpatient setting prior to returning home. She reports her pain is controlled. She lives in a quad level home with multiple half staircases.   Objective     Sit to Stand: Contact guard assistance(from chair, w/c x 3, from toilet, from mat table)  Stand to sit: Contact guard assistance  Bed to Chair: Contact guard assistance(chair <> The patient would not be able to resolve these daily living tasks using a cane or walker. The patient can self-propel a lightweight wheelchair safely in their home and can maneuver within their home with adequate access. The patient cannot self-propel in a standard wheelchair. The need for this equipment was discussed with the patient and she understands, is in agreement, and has not expressed an unwillingness to use the wheelchair. Team conference was held today on the patient and discussed directly with the patient utilizing their entire treatment team. Please see separate team note for details. Total treatment time for today's care >35 min.     Becki Reed MD   6/30/2020, 8:44 AM

## 2020-06-30 NOTE — DISCHARGE SUMMARY
Physical Medicine & Rehabilitation  Discharge Summary     Patient Identification:  Mary Linn  : 1933  Admit date: 2020  Discharge date: 2020  Attending provider: Fernando Fowler MD        Primary care provider: Marichuy Hatch MD     Discharge Diagnoses:   Patient Active Problem List   Diagnosis    Paroxysmal atrial fibrillation [facility])    Closed fracture of lateral portion of right tibial plateau    Chronic anticoagulation    Closed fracture of upper end of right fibula    Fall on stairs    Closed fracture of right tibial plateau       Discharge Functional Status:    Physical therapy:  Bed Mobility: Scooting: Supervision  Transfers: Sit to Stand: Contact guard assistance(from chair, w/c x 3, from toilet, from mat table)  Stand to sit: Contact guard assistance  Bed to Chair: Contact guard assistance(chair <> w/c, w/c <>toilet, w/c <>mat table)  Squat Pivot Transfers: 2 Person Assistance(bed => w/c at max (A) of 1 + CGA, w/c => recliner at max (A) of 2)  Comment: 2nd session: Seated in chair upon arrival. Requesting to use restroom. Transferred recliner to w/c with CGA, w/c <> toilet with CGA, able to perform clothing management and adelia-care without assistance. Washed hands at w/c level. Propelled w/c 123' with BUEs and supervision. Performed following exerices standing in parallel bars with frequent seated rest breaks: sit to stand x 5-6 with CGA, hip abuction on RLE x 10, hip flexion on RLE x 10, mini-squat on LLE x 10, heel raises on LLE x 10 (minimal excursion noted), laterally reaching tasks outside of GURVINDER x 10 on BUE and over head reaching task x 10 with BUEs (1 UE support on parallel bar at all times) - able to maintain NWBing throughout. Performed seated exercises in recliner: ankle pumps bilaterally x 15, glut sets bilaterally x 15, quad sets bilaterally x 15.  Left with needs in reach and alarm on.,  ,    Mobility:  , PT Equipment Recommendations  Equipment Needed: Yes  Mobility Devices: Wheelchair  Wheelchair: Light Weight  Other: 18\" manual w/c with cushion and elevating leg rests, Assessment: Patient continues to need CGA for transfers and pre-gait exercises in paralle bars. Improved tolerance of upright mobility this date, but continues to fatigue quickly. Continue to recommend skilled PT to address above deficits    Occupational therapy:  ,  , Assessment: Pt is not at her baseline of occupational function. Pt continues to require CGA for functional transfers (stand pivot). Pt requires Min A for LB dressing to pull up shorts and plans at d/c home to wear moo-moo and brief for eaier LB dressing. Pt would benefit from continued intensive skilled acute OT services to addressed these deficits. Continue with POC. Speech therapy:       Inpatient Rehabilitation Course:   Meenakshi Chatman is a 80 y.o. female admitted to inpatient rehabilitation on 6/12/2020 s/p Closed fracture of right tibial plateau. The patient participated in an aggressive multidisciplinary inpatient rehabilitation program involving 3 hours of therapy per day, at least 5 days per week. She progressed well in therapy and was able be discharged home with home health care. Her medical rehab course was significant for below:    Right tibial plateau fracture: NWB in immobilizer ~10 weeks. Pain controlled with tylenol and braxton. Prescriptions provided. PT/OT     Proximal right fibular fracture: as above     NATHAN: Patient refused IVF, Cr much improved with increased po fluid intake. Resolved     Afib: eliquis     HTN: norvasc 10 daily      HLD: lipitor 40     DM: glipizide 5 d/c'd, tradjenta 5, SSI, resumed home metformin. Restart invokana at discharge. I have concerns of starting lantus back due to labile am glucoses. She will need to FU with her PCP after d/c and daily monitoring.      Right calf pain: negative DVT US     Hyponatremia: resolved prior to discharge    Discharge Exam:  Gen: No distress, pleasant.  Resting in bedside chair  HEENT: Normocephalic, atraumatic  CV: Regular rate and rhythm. No MRG   Resp: No respiratory distress. CTAB   Abd: Soft, nontender nondistended  Ext: no BLE edema. RLE in immobilizer  Neuro: Alert, oriented, appropriately interactive. Significant Diagnostics:   Lab Results   Component Value Date    CREATININE 1.1 06/29/2020    BUN 20 06/29/2020     06/29/2020    K 4.3 06/29/2020     06/29/2020    CO2 21 06/29/2020       Lab Results   Component Value Date    WBC 5.4 06/29/2020    HGB 8.5 (L) 06/29/2020    HCT 27.0 (L) 06/29/2020    MCV 77.5 (L) 06/29/2020     06/29/2020       Disposition:  Home in stable condition. Follow-up:  See after visit summary from hospitalization    Discharge Medications:     Medication List      START taking these medications    oxyCODONE 5 MG immediate release tablet  Commonly known as:  ROXICODONE  Take 1-2 tablets by mouth every 4 hours as needed for Pain for up to 7 days.      senna 8.6 MG tablet  Commonly known as:  SENOKOT  Take 2 tablets by mouth 2 times daily        CONTINUE taking these medications    amiodarone 200 MG tablet  Commonly known as:  CORDARONE     amLODIPine 5 MG tablet  Commonly known as:  NORVASC     aspirin 81 MG EC tablet     atorvastatin 40 MG tablet  Commonly known as:  LIPITOR     azelastine 0.05 % ophthalmic solution  Commonly known as:  OPTIVAR     citalopram 10 MG tablet  Commonly known as:  CELEXA     Eliquis 2.5 MG Tabs tablet  Generic drug:  apixaban     Invokana 100 MG Tabs tablet  Generic drug:  canagliflozin     latanoprost 0.005 % ophthalmic solution  Commonly known as:  XALATAN     metFORMIN 500 MG tablet  Commonly known as:  GLUCOPHAGE     Tradjenta 5 MG tablet  Generic drug:  linagliptin     UNKNOWN TO PATIENT        STOP taking these medications    Basaglar KwikPen 100 UNIT/ML injection pen  Generic drug:  insulin glargine     glipiZIDE 5 MG tablet  Commonly known as:  GLUCOTROL     HYDROcodone-acetaminophen  MG per tablet  Commonly known as:  NORCO     Magic Mouthwash  Commonly known as:  Miracle Mouthwash     nystatin 376684 UNIT/ML suspension  Commonly known as:  MYCOSTATIN           Where to Get Your Medications      You can get these medications from any pharmacy    Bring a paper prescription for each of these medications  · oxyCODONE 5 MG immediate release tablet  · senna 8.6 MG tablet         I spent over 34 minutes on this discharge encounter between counseling, coordination of care, and medication reconciliation. To comply with [facility] bylaw R.II.[id number]:  Discharge order placed in advance to facilitate patient's discharge needs. Jose Yepez MD    * This document was created using dictation software. While all precautions were taken to ensure accuracy, errors may have occurred. Please disregard any typographical errors.

## 2020-06-30 NOTE — CARE COORDINATION
Faxed home care orders to [facility].  Electronically signed by NIKOLE Quintana LSW on 6/30/2020 at 9:58 AM

## 2020-06-30 NOTE — PROGRESS NOTES
Occupational Therapy  Facility/Department: [facility] ACUTE REHAB UNIT  Daily Treatment Note and Discharge Summary  NAME: Tawanda Coleman  : 1933  MRN: [id number]    Date of Service: 2020    Discharge Recommendations:  Home with Home health OT, S Level 3, Home with assist PRN  OT Equipment Recommendations  Equipment Needed: Yes  Mobility Devices: ADL Assistive Devices  ADL Assistive Devices: Transfer Tub Bench  Other: has raised toilet seat, hip kit, will likely require w/c for mobility     Assessment   Performance deficits / Impairments: Decreased functional mobility ; Decreased balance;Decreased ADL status; Decreased endurance;Decreased high-level IADLs  Assessment: Pt is not at her baseline of occupational function. Pt continues to require CGA for functional transfers (stand pivot). Pt requires Jennifer for LB dressing to pull up shorts and plans at d/c home to wear gown for easier LB dressing. Pt has progressed w/ w/c mobility during functional tasks w/ increased activity tolerance. Pt would benefit from continued skilled OT services to addressed these deficits. Continue with POC. Treatment Diagnosis: Decreased ADL/IADL status, functional mobility, endurance and balance associated with R lateral tibia plateau fx  Prognosis: Good  OT Education: OT Role;Plan of Care;ADL Adaptive Strategies;Transfer Training;Home Exercise Program  Patient Education: pt demonstrated understanding  REQUIRES OT FOLLOW UP: Yes  Activity Tolerance  Activity Tolerance: Patient Tolerated treatment well  Safety Devices  Safety Devices in place: Yes  Type of devices: Left in chair;Call light within reach; Chair alarm in place; All fall risk precautions in place         Patient Diagnosis(es): The encounter diagnosis was Closed fracture of right tibial plateau with routine healing, subsequent encounter.       has a past medical history of Arthritis, Diabetes mellitus ([facility] [address] Hyperlipidemia, Hypertension, and Paroxysmal atrial fibrillation ([facility] [address] has a past surgical history that includes Cholecystectomy (1990). Restrictions  Restrictions/Precautions  Restrictions/Precautions: Weight Bearing  Required Braces or Orthoses?: Yes  Lower Extremity Weight Bearing Restrictions  Right Lower Extremity Weight Bearing: Non Weight Bearing  Required Braces or Orthoses  Right Lower Extremity Brace: Knee Immobilizer  Right Upper Extremity Brace/Splint: Pt wears R UE splint for support w/WB; broke wrist 4/2019  Position Activity Restriction  Other position/activity restrictions: 80-year-old female with a history of diabetes, hyperlipidemia, hypertension, and A. fib on Eliquis who was admitted on 6/10 with right knee pain after falling down a few stairs. X-ray revealed a comminuted and mildly depressed fracture involving the lateral tibial plateau as well as a proximal fracture of the fibular head. Ortho evaluated and suggested conservative management with nonweightbearing in the right lower extremity. Therapy evaluated and suggested she continue in an inpatient setting prior to returning home. She reports her pain is controlled. She lives in a quad level home with multiple half staircases. Subjective   General  Chart Reviewed: Yes  Patient assessed for rehabilitation services?: Yes  Response to previous treatment: Patient with no complaints from previous session  Family / Caregiver Present: No  Referring Practitioner: Babs He DO, for d/c planning  Diagnosis: R lateral tibia plateau fx  Subjective  Subjective: Pt supine in bed at entry, agreeable to OT session and declining shower  General Comment  Comments: Pt in wc Mod I to closet to gather clothing and back to bathroom. Pt stand pivot shower transfer CGA. Pt bathed UB (Mod I) and LB (CGA in stance to dry buttocks). Pt dressed UB - bra/shirt (Independent) and LB - sock/shorts/brief (setup/Min A to pull up shorts. Pt seated in wc at sink groomed (oral care/hair) at Independent.   Pt stand pivot CGA  to recliner. Call light in reach, chair alarm on. and RN present. Vital Signs  Patient Currently in Pain: Denies          Objective    ADL  Grooming: Independent(oral care and face washing seated at sink)  UE Dressing: Independent  LE Dressing: Minimal assistance(assist for management of shorts only when donning over hips; pt able to thread LEs into undergarments/pants and don undergarments over hips w/ CGA)  Toileting: Stand by assistance(SBA d/t occasional need for assist w/ clothing management)  Additional Comments: pt completed w/c mobility to/from bathroom for toileting, pt then gathered clothing items and transported them to recliner- UB/LB dressing completed seated in recliner to simulate home set-up        Balance  Sitting Balance: Modified independent   Standing Balance: Stand by assistance  Standing Balance  Time: ~5min total  Activity: functional transfers and ADL completion  Functional Mobility  Functional - Mobility Device: Wheelchair  Activity: To/from bathroom; Other  Assist Level: Modified independent   Functional Mobility Comments: within room to gather clothing items and for ADL completion  Toilet Transfers  Toilet - Technique: Stand pivot  Equipment Used: Raised toilet seat with rails  Toilet Transfer: Contact guard assistance  Toilet Transfers Comments: use of grab bar, cues for hand placement  Bed mobility  Supine to Sit: Modified independent  Sit to Supine: Modified independent  Scooting: Modified independent  Transfers  Stand Pivot Transfers: Contact guard assistance  Sit to stand: Stand by assistance  Stand to sit: Stand by assistance           Cognition  Overall Cognitive Status: WFL        Type of ROM/Therapeutic Exercise  Type of ROM/Therapeutic Exercise: AROM  Comment: B UE TE w/ light resistance theraband to address strength and endurance: 12 reps x2 of shoulder flexion, shoulder extension, elbow flexion, forward punches, rest breaks as needed.  pt able to independently recall 2/4 Ne performed. Second Session:  Pt seated in recliner at entry, agreeable to OT session. Pt denied pain. SPTs w/ CGA w/c<>recliner and commode this session. Pt completed toileting w/ CGA for doffing clothing and Jennifer to don shorts over hips in stance, Favian for adelia hygiene. Pt put on w/c leg rest w/set-up and Moderate VCs. Pt completed 273+ft of w/c mobility in hallway and within room to address endurance and UB strength. 3 rest breaks required throughout. Pt completed puzzle to address [address], visual scanning, and problem solving w/ activity of interest- pt able to complete w/ 1 VC to orient puzzle piece and increased time required. Pt completed pinch/ strengthening w/ medium resistance theraputty seated at table, pt demonstrated carryover of exercises from prior session. D/c planning and discussion throughout session- all questions/concerns addressed.  Pt left seated in w/c at end of session, chair alarm on, call light and needs within reach            Plan   Plan: D/C home with [place] OT   Current Treatment Recommendations: Functional Mobility Training, Safety Education & Training, Balance Training, Self-Care / ADL, Endurance Training, Wheelchair Mobility Training, Patient/Caregiver Education & Training       Goals  Short term goals  Time Frame for Short term goals: 3 weeks   Short term goal 1: Mod I functional transfers to toilet and shower-- goal not met, CGA 6/30  Short term goal 2: mod I UB bathing/dressing - goal met 6/25  Short term goal 3: min A LB bathing/dressing - goal met bathing 6/22, goal met dressing 6/26  Short term goal 4: min A toileting - goal met 6/22  Short term goal 5: mod I functional mobility from w/c level for ADLs/IADLs - goal met 6/24  Long term goals  Long term goal 1: NEW GOAL 6/29: Supervision for LB dressing w/ AE as needed-- goal not met, CGA 6/30  Long term goal 2: NEW Goal 6/29: Supervision for toileting-- goal not met, CGA-SBA 6/30  Patient Goals   Patient goals : \"to return

## 2020-06-30 NOTE — PLAN OF CARE
Problem: IP BLADDER/VOIDING  Goal: LTG - patient will achieve acceptable level of continence  Outcome: Met This Shift     Problem: Falls - Risk of:  Goal: Will remain free from falls  Description: Will remain free from falls  Outcome: Ongoing  Goal: Absence of physical injury  Description: Absence of physical injury  Outcome: Ongoing     Problem: Pain:  Goal: Control of acute pain  Description: Control of acute pain  Outcome: Ongoing     Problem: IP BOWEL ELIMINATION  Goal: LTG - patient will have regular and routine bowel evacuation  Outcome: Ongoing     Problem: SKIN INTEGRITY  Goal: STG - patient will maintain good skin integrity  Outcome: Ongoing

## 2020-06-30 NOTE — PLAN OF CARE
Problem: Falls - Risk of:  Goal: Will remain free from falls  Description: Will remain free from falls  6/30/2020 1529 by Kathy Laurent RN  Outcome: Ongoing  Note: Fall risk precautions in place, call light in reach, bed in lowest position, 2/2 bed rails up, bed wheels locked, bed side table within reach, bed alarm on, will continue to monitor. Problem: Falls - Risk of:  Goal: Absence of physical injury  Description: Absence of physical injury  6/30/2020 1529 by Kathy Laurent RN  Outcome: Ongoing  Note: Pt is free of injury. No injury noted. Fall precautions in place. Call light within reach. Will monitor. Problem: Pain:  Goal: Pain level will decrease  Description: Pain level will decrease  Outcome: Ongoing  Note: Pt complaints of the knee this shift. Pt being medicated according to numerical pain scale. Problem: IP BLADDER/VOIDING  Goal: LTG - patient will achieve acceptable level of continence  6/30/2020 1529 by Kathy Laurent RN  Outcome: Ongoing  Note: Pt is continent of urine. Problem: SKIN INTEGRITY  Goal: STG - patient will maintain good skin integrity  6/30/2020 1529 by Kathy Laurent RN  Outcome: Ongoing  Note: No new skin issues identified.

## 2020-06-30 NOTE — CARE COORDINATION
Team conference/Weekly Summary     Team conference held today. Met with pt to discuss progress with therapy, barriers to discharge, and plans to return home. Estimated discharge date set for 7/1. Will continue to follow for support and discharge planning.  NIKOLE Rahman, KEONW

## 2020-07-01 VITALS
BODY MASS INDEX: 26.67 KG/M2 | TEMPERATURE: 98 F | SYSTOLIC BLOOD PRESSURE: 159 MMHG | WEIGHT: 150.5 LBS | DIASTOLIC BLOOD PRESSURE: 60 MMHG | HEIGHT: 63 IN | RESPIRATION RATE: 16 BRPM | OXYGEN SATURATION: 99 % | HEART RATE: 62 BPM

## 2020-07-01 LAB
GLUCOSE BLD-MCNC: 136 MG/DL (ref 70–99)
PERFORMED ON: ABNORMAL

## 2020-07-01 PROCEDURE — 6370000000 HC RX 637 (ALT 250 FOR IP): Performed by: PHYSICAL MEDICINE & REHABILITATION

## 2020-07-01 PROCEDURE — 6370000000 HC RX 637 (ALT 250 FOR IP): Performed by: NURSE PRACTITIONER

## 2020-07-01 RX ADMIN — DESMOPRESSIN ACETATE 40 MG: 0.2 TABLET ORAL at 09:25

## 2020-07-01 RX ADMIN — METFORMIN HYDROCHLORIDE 500 MG: 500 TABLET ORAL at 09:25

## 2020-07-01 RX ADMIN — DIPHENHYDRAMINE HCL 25 MG: 25 TABLET ORAL at 03:31

## 2020-07-01 RX ADMIN — OXYCODONE 5 MG: 5 TABLET ORAL at 10:32

## 2020-07-01 RX ADMIN — ALOGLIPTIN 12.5 MG: 12.5 TABLET, FILM COATED ORAL at 09:25

## 2020-07-01 RX ADMIN — APIXABAN 2.5 MG: 2.5 TABLET, FILM COATED ORAL at 09:24

## 2020-07-01 RX ADMIN — AMLODIPINE BESYLATE 10 MG: 5 TABLET ORAL at 09:25

## 2020-07-01 ASSESSMENT — PAIN SCALES - GENERAL: PAINLEVEL_OUTOF10: 6

## 2020-07-01 NOTE — PLAN OF CARE
Problem: Falls - Risk of:  Goal: Will remain free from falls  Description: Will remain free from falls  7/1/2020 0334 by Kim Novoa RN  Outcome: Ongoing     Problem: Falls - Risk of:  Goal: Absence of physical injury  Description: Absence of physical injury  7/1/2020 0334 by Kim Novoa RN  Outcome: Ongoing

## 2020-07-01 NOTE — CARE COORDINATION
[facility] received a referral to this patient for a wheelchair. Wheelchair has been delivered to the patients room. Thank you for the referral.  Electronically signed by Dae Aguilar on 7/1/2020 at 9:53 AM  Cell ph# [phone]    NOTE: After 5:00 pm, Weekends, Holidays: Call [facility] at [phone] to coordinate delivery of home medical equipment.

## 2020-07-21 ENCOUNTER — OFFICE VISIT (OUTPATIENT)
Dept: ORTHOPEDIC SURGERY | Age: 85
End: 2020-07-21
Payer: MEDICARE

## 2020-07-21 VITALS — TEMPERATURE: 98.6 F | WEIGHT: 150 LBS | BODY MASS INDEX: 26.58 KG/M2 | HEIGHT: 63 IN

## 2020-07-21 PROCEDURE — G8427 DOCREV CUR MEDS BY ELIG CLIN: HCPCS | Performed by: ORTHOPAEDIC SURGERY

## 2020-07-21 PROCEDURE — 1090F PRES/ABSN URINE INCON ASSESS: CPT | Performed by: ORTHOPAEDIC SURGERY

## 2020-07-21 PROCEDURE — 99213 OFFICE O/P EST LOW 20 MIN: CPT | Performed by: ORTHOPAEDIC SURGERY

## 2020-07-21 PROCEDURE — G8417 CALC BMI ABV UP PARAM F/U: HCPCS | Performed by: ORTHOPAEDIC SURGERY

## 2020-07-21 PROCEDURE — 1123F ACP DISCUSS/DSCN MKR DOCD: CPT | Performed by: ORTHOPAEDIC SURGERY

## 2020-07-21 PROCEDURE — 4040F PNEUMOC VAC/ADMIN/RCVD: CPT | Performed by: ORTHOPAEDIC SURGERY

## 2020-07-21 PROCEDURE — 1111F DSCHRG MED/CURRENT MED MERGE: CPT | Performed by: ORTHOPAEDIC SURGERY

## 2020-07-21 PROCEDURE — L1832 KO ADJ JNT POS R SUP PRE CST: HCPCS | Performed by: ORTHOPAEDIC SURGERY

## 2020-07-21 PROCEDURE — 1036F TOBACCO NON-USER: CPT | Performed by: ORTHOPAEDIC SURGERY

## 2020-07-21 NOTE — PROGRESS NOTES
ORTHOPAEDIC PROGRESS NOTE    Chief Complaint   Patient presents with    Knee Pain     New patient right knee pain -- DOI 06/09/2020       HPI  7/21/2020  FU right tibial plateau fx  doi 0/2/4704  She is at home  She is using the KI and keeping NWB  She denies pain, she reports doing well  Denies skin breakdown  Denies N/T      Past Medical History:   Diagnosis Date    Arthritis     Diabetes mellitus ([facility]     Hyperlipidemia     Hypertension     Paroxysmal atrial fibrillation ([facility] [address] [facility]        Past Surgical History:   Procedure Laterality Date    CHOLECYSTECTOMY  1990       Social History     Socioeconomic History    Marital status:      Spouse name: Not on file    Number of children: Not on file    Years of education: Not on file    Highest education level: Not on file   Occupational History    Not on file   Social Needs    Financial resource strain: Not on file    Food insecurity     Worry: Not on file     Inability: Not on file    Transportation needs     Medical: Not on file     Non-medical: Not on file   Tobacco Use    Smoking status: Never Smoker    Smokeless tobacco: Never Used   Substance and Sexual Activity    Alcohol use: No    Drug use: No    Sexual activity: Not on file   Lifestyle    Physical activity     Days per week: Not on file     Minutes per session: Not on file    Stress: Not on file   Relationships    Social connections     Talks on phone: Not on file     Gets together: Not on file     Attends Pent[employer]ostal service: Not on file     Active member of club or organization: Not on file     Attends meetings of clubs or organizations: Not on file     Relationship status: Not on file    Intimate partner violence     Fear of current or ex partner: Not on file     Emotionally abused: Not on file     Physically abused: Not on file     Forced sexual activity: Not on file   Other Topics Concern    Not on file   Social History Narrative    Not on file       Current Outpatient Medications   Medication Sig Dispense Refill    senna (SENOKOT) 8.6 MG tablet Take 2 tablets by mouth 2 times daily 120 tablet 1    metFORMIN (GLUCOPHAGE) 500 MG tablet Take 500 mg by mouth daily (with breakfast)      aspirin 81 MG EC tablet Take 81 mg by mouth daily      amiodarone (CORDARONE) 200 MG tablet Take 200 mg by mouth daily      apixaban (ELIQUIS) 2.5 MG TABS tablet Take 5 mg by mouth 2 times daily Patient does not know dose       linagliptin (TRADJENTA) 5 MG tablet Take 5 mg by mouth daily      canagliflozin (INVOKANA) 100 MG TABS tablet Take 100 mg by mouth 2 times daily      UNKNOWN TO PATIENT A little orange pill for thyroid      atorvastatin (LIPITOR) 40 MG tablet Take 40 mg by mouth daily      amLODIPine (NORVASC) 5 MG tablet Take 5 mg by mouth daily      citalopram (CELEXA) 10 MG tablet Take 10 mg by mouth daily      azelastine (OPTIVAR) 0.05 % ophthalmic solution 1 drop 2 times daily      latanoprost (XALATAN) 0.005 % ophthalmic solution Place 1 drop into both eyes nightly       No current facility-administered medications for this visit. Vitals:    07/21/20 1416   Temp: 98.6 °F (37 °C)   Weight: 150 lb (68 kg)   Height: 5' 3\" (1.6 m)       Physical Exam:  Body mass index is 26.57 kg/m². Right knee - genu valgum   No TTP lateral tibial plateau   ROM ~5 - 90  SILT SP/DP/T/sural/saphenous nerve distributions; EHL/TA/GS intact      Imaging:  Images were personally reviewed by myself and discussed with the patient  Narrative    EXAMINATION:    2 XRAY VIEWS OF THE RIGHT KNEE         6/22/2020 5:49 pm         COMPARISON:    06/09/2020         HISTORY:    ORDERING SYSTEM PROVIDED HISTORY: s/p 6/10/20 RIGHT tibial plateau fracture -    F U xrays    TECHNOLOGIST PROVIDED HISTORY:    Reason for exam:->s/p 6/10/20 RIGHT tibial plateau fracture - F U xrays    Reason for Exam: S/p 6/10/20 right tibial plateau fx f/u.     Acuity: Acute    Type of Exam: Subsequent/Follow-up         FINDINGS: There is overall similar appearance of left tibial plateau fracture with    intra-articular extension.  No significant articular surface depression or    distraction.  No dislocation.  Small effusion.  Diffuse osteopenia.              Impression    There is overall similar appearance of left tibial plateau fracture with    intra-articular extension.               Right knee 2 views performed today in clinic - Lateral tibial plateau fracture again seen. No change in alignment. No significant depression. Mild interval callus seen. Assessment & Plan:  80 y.o. female following up for   Diagnosis Orders   1. Closed fracture of lateral portion of right tibial plateau, initial encounter  XR KNEE RIGHT (1-2 VIEWS)    Breg T Scope Knee Brace   2. Closed fracture of proximal end of right fibula, unspecified fracture morphology, initial encounter  XR KNEE RIGHT (1-2 VIEWS)    Breg T Scope Knee Brace   3. Closed fracture of right tibial plateau, initial encounter  XR KNEE RIGHT (1-2 VIEWS)    Breg T Scope Knee Brace           Procedures    Breg T Scope Knee Brace     Patient was prescribed a Breg T-Scope Brace. The right knee will require stabilization / immobilization from this semi-rigid / rigid orthosis to improve their function. The orthosis will assist in protecting the affected area, provide functional support and facilitate healing. The prefabricated orthosis was modified in the following manner to provide a customizable fit for the patient at the time of delivery. 1.  Identification of appropriate positioning and alignment of anatomical landmarks. 2.  Trimming of straps and adjustment of frame to specifically fit patient. 3.  Polycentric hinge adjustment in flexion and extension. The patient was educated and fit by a healthcare professional with expert knowledge and specialization in brace application while under the direct supervision of the treating physician.   Verbal and written instructions for the use of and application of this item were provided. They were instructed to contact the office immediately should the brace result in increased pain, decreased sensation, increased swelling or worsening of the condition.      Doing well  Hinged knee brace, fully unlocked  Continue NWB    FU in 1 month with repeat right knee XRs  If doing well then, will be ~10 weeks out, and will initiate WB then    Cl Arroyo

## 2020-07-22 ENCOUNTER — TELEPHONE (OUTPATIENT)
Dept: ORTHOPEDIC SURGERY | Age: 85
End: 2020-07-22

## 2020-07-24 ENCOUNTER — TELEPHONE (OUTPATIENT)
Dept: ORTHOPEDIC SURGERY | Age: 85
End: 2020-07-24

## 2020-07-24 NOTE — TELEPHONE ENCOUNTER
Rosaline Jaramillo from [facility] called; has questions about brace wear time and ROM knee flexion. Please advise.     Call [phone] to discuss

## 2020-08-25 ENCOUNTER — OFFICE VISIT (OUTPATIENT)
Dept: ORTHOPEDIC SURGERY | Age: 85
End: 2020-08-25
Payer: MEDICARE

## 2020-08-25 VITALS — WEIGHT: 148 LBS | HEIGHT: 63 IN | BODY MASS INDEX: 26.22 KG/M2 | TEMPERATURE: 98 F

## 2020-08-25 PROCEDURE — 1090F PRES/ABSN URINE INCON ASSESS: CPT | Performed by: ORTHOPAEDIC SURGERY

## 2020-08-25 PROCEDURE — 1036F TOBACCO NON-USER: CPT | Performed by: ORTHOPAEDIC SURGERY

## 2020-08-25 PROCEDURE — 4040F PNEUMOC VAC/ADMIN/RCVD: CPT | Performed by: ORTHOPAEDIC SURGERY

## 2020-08-25 PROCEDURE — G8417 CALC BMI ABV UP PARAM F/U: HCPCS | Performed by: ORTHOPAEDIC SURGERY

## 2020-08-25 PROCEDURE — 99213 OFFICE O/P EST LOW 20 MIN: CPT | Performed by: ORTHOPAEDIC SURGERY

## 2020-08-25 PROCEDURE — G8427 DOCREV CUR MEDS BY ELIG CLIN: HCPCS | Performed by: ORTHOPAEDIC SURGERY

## 2020-08-25 PROCEDURE — 1123F ACP DISCUSS/DSCN MKR DOCD: CPT | Performed by: ORTHOPAEDIC SURGERY

## 2020-08-25 NOTE — PROGRESS NOTES
ORTHOPAEDIC PROGRESS NOTE    Chief Complaint   Patient presents with    Follow-up     Right knee tibial plateau Fx; doi 3/7/84.        HPI   8/25/2020  FU right knee  She is doing well  She denies pain  Using brace  Maintaining NWB status  Denies N/T      7/21/2020  FU right tibial plateau fx  doi 4/8/2818  She is at home  She is using the [address] and keeping NWB  She denies pain, she reports doing well  Denies skin breakdown  Denies N/T      Past Medical History:   Diagnosis Date    Arthritis     Diabetes mellitus ([address]     Hyperlipidemia     Hypertension     Paroxysmal atrial fibrillation ([facility])        Past Surgical History:   Procedure Laterality Date    CHOLECYSTECTOMY  1990       Social History     Socioeconomic History    Marital status:      Spouse name: Not on file    Number of children: Not on file    Years of education: Not on file    Highest education level: Not on file   Occupational History    Not on file   Social Needs    Financial resource strain: Not on file    Food insecurity     Worry: Not on file     Inability: Not on file    Transportation needs     Medical: Not on file     Non-medical: Not on file   Tobacco Use    Smoking status: Never Smoker    Smokeless tobacco: Never Used   Substance and Sexual Activity    Alcohol use: No    Drug use: No    Sexual activity: Not on file   Lifestyle    Physical activity     Days per week: Not on file     Minutes per session: Not on file    Stress: Not on file   Relationships    Social connections     Talks on phone: Not on file     Gets together: Not on file     Attends Buddhist service: Not on file     Active member of club or organization: Not on file     Attends meetings of clubs or organizations: Not on file     Relationship status: Not on file    Intimate partner violence     Fear of current or ex partner: Not on file     Emotionally abused: Not on file     Physically abused: Not on file     Forced sexual activity: Not on file Other Topics Concern    Not on file   Social History Narrative    Not on file       Current Outpatient Medications   Medication Sig Dispense Refill    metFORMIN (GLUCOPHAGE) 500 MG tablet Take 500 mg by mouth daily (with breakfast)      aspirin 81 MG EC tablet Take 81 mg by mouth daily      amiodarone (CORDARONE) 200 MG tablet Take 200 mg by mouth daily      apixaban (ELIQUIS) 2.5 MG TABS tablet Take 5 mg by mouth 2 times daily Patient does not know dose       linagliptin (TRADJENTA) 5 MG tablet Take 5 mg by mouth daily      canagliflozin (INVOKANA) 100 MG TABS tablet Take 100 mg by mouth 2 times daily      UNKNOWN TO PATIENT A little orange pill for thyroid      atorvastatin (LIPITOR) 40 MG tablet Take 40 mg by mouth daily      amLODIPine (NORVASC) 5 MG tablet Take 5 mg by mouth daily      citalopram (CELEXA) 10 MG tablet Take 10 mg by mouth daily      azelastine (OPTIVAR) 0.05 % ophthalmic solution 1 drop 2 times daily      latanoprost (XALATAN) 0.005 % ophthalmic solution Place 1 drop into both eyes nightly       No current facility-administered medications for this visit. Vitals:    08/25/20 1313   Temp: 98 °F (36.7 °C)   TempSrc: Infrared   Weight: 148 lb (67.1 kg)   Height: 5' 3\" (1.6 m)       Physical Exam:  Body mass index is 26.22 kg/m². Right knee - genu valgum   No TTP lateral tibial plateau   ROM ~5 - 90, stable  SILT SP/DP/T/sural/saphenous nerve distributions; EHL/TA/GS intact      Imaging:  Images were personally reviewed by myself and discussed with the patient  Right knee 2 views performed today in clinic - Lateral tibial plateau fracture again seen. No change in alignment. No significant depression. Mild interval callus seen. Bones appear significantly demineralized. Assessment & Plan:  80 y.o. female following up for   Diagnosis Orders   1.  Closed fracture of lateral portion of right tibial plateau with routine healing, subsequent encounter  XR KNEE RIGHT (1-2 VIEWS)   2. Age-related osteoporosis without current pathological fracture      last DEXA 2008  needs update  not currently taking meds for this       No orders of the defined types were placed in this encounter.     Advance RLE WBAT in hinged knee brace  Unlimited ROM  When at rest, does not need the brace on any more  Gradually transition out of brace all together as strength returns and gait normalizes    FU in 3 months with repeat WB XRs    Will order DEXA if Epic will let me    Dipak Gip

## 2020-08-31 ENCOUNTER — HOSPITAL ENCOUNTER (OUTPATIENT)
Dept: GENERAL RADIOLOGY | Age: 85
Discharge: HOME | End: 2020-08-31
Payer: MEDICARE

## 2020-08-31 PROCEDURE — 77080 DXA BONE DENSITY AXIAL: CPT

## 2020-09-08 ENCOUNTER — TELEPHONE (OUTPATIENT)
Dept: ORTHOPEDIC SURGERY | Age: 85
End: 2020-09-08

## 2020-11-25 ENCOUNTER — OFFICE VISIT (OUTPATIENT)
Dept: ORTHOPEDIC SURGERY | Age: 85
End: 2020-11-25
Payer: MEDICARE

## 2020-11-25 VITALS — HEIGHT: 63 IN | RESPIRATION RATE: 16 BRPM | TEMPERATURE: 97.7 F | WEIGHT: 148 LBS | BODY MASS INDEX: 26.22 KG/M2

## 2020-11-25 PROCEDURE — G8484 FLU IMMUNIZE NO ADMIN: HCPCS | Performed by: ORTHOPAEDIC SURGERY

## 2020-11-25 PROCEDURE — G8417 CALC BMI ABV UP PARAM F/U: HCPCS | Performed by: ORTHOPAEDIC SURGERY

## 2020-11-25 PROCEDURE — 1123F ACP DISCUSS/DSCN MKR DOCD: CPT | Performed by: ORTHOPAEDIC SURGERY

## 2020-11-25 PROCEDURE — 1036F TOBACCO NON-USER: CPT | Performed by: ORTHOPAEDIC SURGERY

## 2020-11-25 PROCEDURE — G8427 DOCREV CUR MEDS BY ELIG CLIN: HCPCS | Performed by: ORTHOPAEDIC SURGERY

## 2020-11-25 PROCEDURE — 4040F PNEUMOC VAC/ADMIN/RCVD: CPT | Performed by: ORTHOPAEDIC SURGERY

## 2020-11-25 PROCEDURE — 99213 OFFICE O/P EST LOW 20 MIN: CPT | Performed by: ORTHOPAEDIC SURGERY

## 2020-11-25 PROCEDURE — 1090F PRES/ABSN URINE INCON ASSESS: CPT | Performed by: ORTHOPAEDIC SURGERY

## 2020-11-25 NOTE — PROGRESS NOTES
Relationship status: Not on file    Intimate partner violence     Fear of current or ex partner: Not on file     Emotionally abused: Not on file     Physically abused: Not on file     Forced sexual activity: Not on file   Other Topics Concern    Not on file   Social History Narrative    Not on file       Current Outpatient Medications   Medication Sig Dispense Refill    metFORMIN (GLUCOPHAGE) 500 MG tablet Take 500 mg by mouth daily (with breakfast)      aspirin 81 MG EC tablet Take 81 mg by mouth daily      amiodarone (CORDARONE) 200 MG tablet Take 200 mg by mouth daily      apixaban (ELIQUIS) 2.5 MG TABS tablet Take 5 mg by mouth 2 times daily Patient does not know dose       linagliptin (TRADJENTA) 5 MG tablet Take 5 mg by mouth daily      canagliflozin (INVOKANA) 100 MG TABS tablet Take 100 mg by mouth 2 times daily      UNKNOWN TO PATIENT A little orange pill for thyroid      atorvastatin (LIPITOR) 40 MG tablet Take 40 mg by mouth daily      amLODIPine (NORVASC) 5 MG tablet Take 5 mg by mouth daily      citalopram (CELEXA) 10 MG tablet Take 10 mg by mouth daily      azelastine (OPTIVAR) 0.05 % ophthalmic solution 1 drop 2 times daily      latanoprost (XALATAN) 0.005 % ophthalmic solution Place 1 drop into both eyes nightly       No current facility-administered medications for this visit. Vitals:    11/25/20 1200   Resp: 16   Temp: 97.7 °F (36.5 °C)   TempSrc: Infrared   Weight: 148 lb (67.1 kg)   Height: 5' 3\" (1.6 m)       Physical Exam:  Body mass index is 26.22 kg/m². Right knee - genu valgum   No TTP lateral tibial plateau   ROM ~5 - 90, stable   Stable to varus/valgus stress  Negative log roll  SILT SP/DP/T/sural/saphenous nerve distributions; EHL/FHL/TA/GS intact      Imaging:  Images were personally reviewed by myself and discussed with the patient  Right knee 2 views performed today in clinic - Lateral tibial plateau fracture again seen. No change in alignment.   No significant knee doing well  RLE WBAT, walker for balance      Discussed at length conservative treatment of hip symptoms/arthritis, according to [facility]OS Clinical Practice Guidelines:  1)  Recommend ice and anti-inflammatories to reduce pain and swelling. 2)  Recommend maintaining weight in a healthy range to reduce stresses on the hip joint. 3)  Home exercise program, focusing on strengthening, low impact aerobic exercises, and neuromuscular education. 4)  Intra-articular corticosteroid injections are an option. She will call if hip symptoms persist/worsen, and I will get her set up for hip injection.       Dawn Soriano

## 2021-04-15 NOTE — PATIENT CARE CONFERENCE
[facility]  Inpatient Rehabilitation  Weekly Team Conference Note    Patient Name: Maru Botello        MRN: [id number]    : 1933  (80 y.o.)  Gender: female   Referring Practitioner: Dr. Brandon Garcia  Diagnosis: fall, tibial plateau and fibular head fractures    The team conference for this patient was held on 20 at 11:00am by:  Litzy Gardner MD     CASE MANAGEMENT:  Assessment: Patient lives at home w/spouse. PSYCHOLOGY:  Assessment:     PHYSICAL THERAPY:    Bed Mobility:   Scooting: Supervision    Transfers:  Sit to Stand: Moderate Assistance(min to mod (A) within session based on surface and fatigue)  Stand to sit: Moderate Assistance(min to mod (A) with VC for improved eccentric lowering)  Bed to Chair: 2 Person Assistance(Max A x 2 )  Squat Pivot Transfers: 2 Person Assistance(bed => w/c at max (A) of 1 + CGA, w/c => recliner at max (A) of 2)  Comment: 1st session:  See above functional mobility. In addition patient completes sit <=>  // bars x 3 completions. Within // bars patient completed (R) LE 3 way hip x 10, (L) heel raises x 10.               QM:  Roll Left and Right  Assistance Needed: Supervision or touching assistance  CARE Score: 4  Discharge Goal: Independent  Sit to Lying  Assistance Needed: Partial/moderate assistance  CARE Score: 3  Discharge Goal: Independent  Lying to Sitting on Side of Bed  Assistance Needed: Supervision or touching assistance  Physical Assistance Level: 25%-49%  CARE Score: 4  Discharge Goal: Independent  Sit to Stand  Assistance Needed: Partial/moderate assistance  CARE Score: 3  Discharge Goal: Independent  Chair/Bed-to-Chair Transfer  Assistance Needed: Partial/moderate assistance  CARE Score: 3  Discharge Goal: Supervision or touching assistance  Car Transfer  Assistance Needed: Partial/moderate assistance  Reason if not Attempted: Not attempted due to medical condition or safety concerns  CARE Score: 3  Discharge Goal: Supervision or touching assistance  Walk 10 Feet  Reason if not Attempted: Not attempted due to medical condition or safety concerns  CARE Score: 88  Discharge Goal: Partial/moderate assistance  Walk 50 Feet with Two Turns  Reason if not Attempted: Not attempted due to medical condition or safety concerns  CARE Score: 88  Discharge Goal: Dependent  Walk 150 Feet  Reason if not Attempted: Not attempted due to medical condition or safety concerns  CARE Score: 88  Discharge Goal: Dependent  Walking 10 Feet on Uneven Surfaces  Reason if not Attempted: Not attempted due to medical condition or safety concerns  CARE Score: 88  1 Step (Curb)  Reason if not Attempted: Not attempted due to medical condition or safety concerns  CARE Score: 88  Discharge Goal: Partial/moderate assistance  4 Steps  Reason if not Attempted: Not attempted due to medical condition or safety concerns  CARE Score: 88  Discharge Goal: Partial/moderate assistance  12 Steps  Reason if not Attempted: Not attempted due to medical condition or safety concerns  CARE Score: 88  Discharge Goal: Not Applicable  Picking Up Object  Reason if not Attempted: Not attempted due to medical condition or safety concerns  CARE Score: 88  Discharge Goal: Supervision or touching assistance  Wheelchair Ability  Uses a Wheelchair and/or Scooter?: Yes    SPEECH THERAPY:    Diet Level:DIET CARB CONTROL; Assessment:         OCCUPATIONAL THERAPY:    ADL:   ADL  Equipment Provided: Sock aid, Reacher  Feeding: Setup, Modified independent (lower dentures )  Grooming: Independent  UE Bathing: Setup  LE Bathing: Stand by assistance, Setup, Verbal cueing  UE Dressing: Setup  LE Dressing: Moderate assistance, Setup, Increased time to complete  Toileting: Minimal assistance(to pull up brief/shorts)  Additional Comments: pt assisted to/from bathroom via w/c for ADLs. UB/LB bathing and dressing completed seated on shower chair. Toilet Transfers:   Toilet Transfers  Toilet - Technique: Stand pivot  Equipment Used: Standard toilet  Toilet Transfer: Moderate assistance  Toilet Transfers Comments: cues for hand placement, use of grab bar    Tub/ShowerTransfers:     Shower Transfers  Shower - Transfer From: Wheelchair  Shower - Transfer Type: To and From  Shower - Transfer To: Shower seat with back  Shower - Technique: Stand pivot  Shower Transfers: Minimal assistance  Shower Transfers Comments: use of grab bar, cues for hand placement    QM:  Eating  Assistance Needed: Setup or clean-up assistance  CARE Score: 5  Discharge Goal: Independent  Oral Hygiene  Assistance Needed: Independent  CARE Score: 6  Discharge Goal: Independent  Toileting Hygiene  Assistance Needed: Partial/moderate assistance  CARE Score: 3  Discharge Goal: Supervision or touching assistance  Toilet Transfer  Assistance Needed: Partial/moderate assistance  CARE Score: 3  Shower/Bathe Self  Assistance Needed: Supervision or touching assistance  CARE Score: 4  Discharge Goal: Supervision or touching assistance  Upper Body Dressing  Assistance Needed: Setup or clean-up assistance  CARE Score: 5  Discharge Goal: Independent  Lower Body Dressing  Assistance Needed: Partial/moderate assistance  CARE Score: 3  Discharge Goal: Supervision or touching assistance  Putting On/Taking Off Footwear  Assistance Needed: Setup or clean-up assistance(for left sock only)  CARE Score: 5  Discharge Goal: Supervision or touching assistance    NUTRITION:  Weight: 149 lb 6.4 oz (67.8 kg) / Body mass index is 26.47 kg/m². Diet Order:CCC    Supplements:NA    Pt's nutrition status remains stable AEB po intake greater than 50% of meals consistently. Pt reports apeptite has slowly returned; denies need for ONS @ this time. Please see nutrition note for details.     NURSING:  FIMS:        Mann Fall Risk Score: 50   Wounds/Incisions/Ulcers: Immobilizer in place   Medication Review: with patient  Pain: yes   Consultations/Labs/X-rays: CBC Every MON., THURS. BMP Every MON. thurs            Risk for Readmission: 18%  Critical Items: If High Risk, consider the following recommendations:Home Health Nursing    Patient/Family Education provided by team:    Discharge Plan   Estimated Length of Stay: 1 week  Destination: home health  Pass:No  Services at Discharge: [place] OT/PT s3, [place] aide,   Equipment at Discharge: lightweight wheelchair, tub transfer bench, has raised toilet seat  Factors facilitating achievement of predicted outcomes: familiar with NWB from prior injury  Barriers to the achievement of predicted outcomes: multi story house  Patient Goals:Improve mobility, \"to return home\" ,     Rehab Team Members in attendance for Team Conference:  Nica Boggs MSW, JOSÉ MIGUEL Olivas RD, MARY KATE    [place], [place]. D, Neuropsychologist    Zurdo Airam, OTR/L    Emelina Ruff, PT, DPT    Roberto Donovan RN, BSN    [facility], [address] [place] [address],     I approve the established interdisciplinary plan of care as documented within the medical record of [address] [place] [address] [place].  Andrew Hubbard MD 6/23/2020, 11:44 AM [Negative] : Heme/Lymph

## 2023-03-05 ENCOUNTER — APPOINTMENT (OUTPATIENT)
Dept: CT IMAGING | Age: 88
DRG: 291 | End: 2023-03-05
Payer: MEDICARE

## 2023-03-05 ENCOUNTER — APPOINTMENT (OUTPATIENT)
Dept: GENERAL RADIOLOGY | Age: 88
DRG: 291 | End: 2023-03-05
Payer: MEDICARE

## 2023-03-05 ENCOUNTER — HOSPITAL ENCOUNTER (INPATIENT)
Age: 88
LOS: 16 days | Discharge: NURSING, REHAB OR LONG-TERM CARE | DRG: 291 | End: 2023-03-21
Attending: FAMILY MEDICINE | Admitting: FAMILY MEDICINE
Payer: MEDICARE

## 2023-03-05 DIAGNOSIS — J90 PLEURAL EFFUSION, BILATERAL: ICD-10-CM

## 2023-03-05 DIAGNOSIS — R06.02 SHORTNESS OF BREATH: Primary | ICD-10-CM

## 2023-03-05 DIAGNOSIS — R19.7 DIARRHEA, UNSPECIFIED TYPE: ICD-10-CM

## 2023-03-05 DIAGNOSIS — R60.0 LEG EDEMA: ICD-10-CM

## 2023-03-05 DIAGNOSIS — J81.0 ACUTE PULMONARY EDEMA (HCC): ICD-10-CM

## 2023-03-05 DIAGNOSIS — R11.0 NAUSEA: ICD-10-CM

## 2023-03-05 LAB
A/G RATIO: 0.9 (ref 1.1–2.2)
ALBUMIN SERPL-MCNC: 3.4 G/DL (ref 3.4–5)
ALP BLD-CCNC: 157 U/L (ref 40–129)
ALT SERPL-CCNC: 21 U/L (ref 10–40)
ANION GAP SERPL CALCULATED.3IONS-SCNC: 10 MMOL/L (ref 3–16)
AST SERPL-CCNC: 32 U/L (ref 15–37)
BACTERIA: NORMAL /HPF
BASOPHILS ABSOLUTE: 0 K/UL (ref 0–0.2)
BASOPHILS RELATIVE PERCENT: 0.6 %
BILIRUB SERPL-MCNC: 0.3 MG/DL (ref 0–1)
BILIRUBIN URINE: NEGATIVE
BLOOD, URINE: NEGATIVE
BUN BLDV-MCNC: 19 MG/DL (ref 7–20)
CALCIUM SERPL-MCNC: 9.3 MG/DL (ref 8.3–10.6)
CHLORIDE BLD-SCNC: 101 MMOL/L (ref 99–110)
CLARITY: CLEAR
CO2: 27 MMOL/L (ref 21–32)
COLOR: YELLOW
CREAT SERPL-MCNC: 1.3 MG/DL (ref 0.6–1.2)
D DIMER: 1.51 UG/ML FEU (ref 0–0.6)
EOSINOPHILS ABSOLUTE: 0.2 K/UL (ref 0–0.6)
EOSINOPHILS RELATIVE PERCENT: 2.9 %
EPITHELIAL CELLS, UA: 0 /HPF (ref 0–5)
GFR SERPL CREATININE-BSD FRML MDRD: 39 ML/MIN/{1.73_M2}
GLUCOSE BLD-MCNC: 186 MG/DL (ref 70–99)
GLUCOSE BLD-MCNC: 194 MG/DL (ref 70–99)
GLUCOSE BLD-MCNC: 232 MG/DL (ref 70–99)
GLUCOSE URINE: NEGATIVE MG/DL
HCT VFR BLD CALC: 34.4 % (ref 36–48)
HEMOGLOBIN: 10.6 G/DL (ref 12–16)
HYALINE CASTS: 0 /LPF (ref 0–8)
KETONES, URINE: NEGATIVE MG/DL
LEUKOCYTE ESTERASE, URINE: NEGATIVE
LIPASE: 25 U/L (ref 13–60)
LYMPHOCYTES ABSOLUTE: 0.5 K/UL (ref 1–5.1)
LYMPHOCYTES RELATIVE PERCENT: 7 %
MCH RBC QN AUTO: 24.3 PG (ref 26–34)
MCHC RBC AUTO-ENTMCNC: 30.9 G/DL (ref 31–36)
MCV RBC AUTO: 78.8 FL (ref 80–100)
MICROSCOPIC EXAMINATION: YES
MONOCYTES ABSOLUTE: 0.8 K/UL (ref 0–1.3)
MONOCYTES RELATIVE PERCENT: 11.9 %
NEUTROPHILS ABSOLUTE: 5.2 K/UL (ref 1.7–7.7)
NEUTROPHILS RELATIVE PERCENT: 77.6 %
NITRITE, URINE: NEGATIVE
PDW BLD-RTO: 22.9 % (ref 12.4–15.4)
PERFORMED ON: ABNORMAL
PERFORMED ON: ABNORMAL
PH UA: 7.5 (ref 5–8)
PLATELET # BLD: 240 K/UL (ref 135–450)
PMV BLD AUTO: 7.8 FL (ref 5–10.5)
POTASSIUM SERPL-SCNC: 4.6 MMOL/L (ref 3.5–5.1)
PRO-BNP: 6174 PG/ML (ref 0–449)
PROTEIN UA: 100 MG/DL
RBC # BLD: 4.37 M/UL (ref 4–5.2)
RBC UA: 1 /HPF (ref 0–4)
SODIUM BLD-SCNC: 138 MMOL/L (ref 136–145)
SPECIFIC GRAVITY UA: 1.01 (ref 1–1.03)
TOTAL PROTEIN: 7.2 G/DL (ref 6.4–8.2)
TROPONIN: 0.01 NG/ML
URINE REFLEX TO CULTURE: ABNORMAL
URINE TYPE: ABNORMAL
UROBILINOGEN, URINE: 0.2 E.U./DL
WBC # BLD: 6.7 K/UL (ref 4–11)
WBC UA: 1 /HPF (ref 0–5)

## 2023-03-05 PROCEDURE — 81001 URINALYSIS AUTO W/SCOPE: CPT

## 2023-03-05 PROCEDURE — 84484 ASSAY OF TROPONIN QUANT: CPT

## 2023-03-05 PROCEDURE — 83690 ASSAY OF LIPASE: CPT

## 2023-03-05 PROCEDURE — 2000000000 HC ICU R&B

## 2023-03-05 PROCEDURE — 6360000004 HC RX CONTRAST MEDICATION: Performed by: PHYSICIAN ASSISTANT

## 2023-03-05 PROCEDURE — 96374 THER/PROPH/DIAG INJ IV PUSH: CPT

## 2023-03-05 PROCEDURE — 6370000000 HC RX 637 (ALT 250 FOR IP): Performed by: FAMILY MEDICINE

## 2023-03-05 PROCEDURE — 2580000003 HC RX 258: Performed by: PHYSICIAN ASSISTANT

## 2023-03-05 PROCEDURE — 94760 N-INVAS EAR/PLS OXIMETRY 1: CPT

## 2023-03-05 PROCEDURE — 85379 FIBRIN DEGRADATION QUANT: CPT

## 2023-03-05 PROCEDURE — 93005 ELECTROCARDIOGRAM TRACING: CPT | Performed by: PHYSICIAN ASSISTANT

## 2023-03-05 PROCEDURE — 2580000003 HC RX 258: Performed by: FAMILY MEDICINE

## 2023-03-05 PROCEDURE — 83880 ASSAY OF NATRIURETIC PEPTIDE: CPT

## 2023-03-05 PROCEDURE — 2500000003 HC RX 250 WO HCPCS: Performed by: FAMILY MEDICINE

## 2023-03-05 PROCEDURE — 80053 COMPREHEN METABOLIC PANEL: CPT

## 2023-03-05 PROCEDURE — 6360000002 HC RX W HCPCS: Performed by: PHYSICIAN ASSISTANT

## 2023-03-05 PROCEDURE — 99285 EMERGENCY DEPT VISIT HI MDM: CPT

## 2023-03-05 PROCEDURE — 2700000000 HC OXYGEN THERAPY PER DAY

## 2023-03-05 PROCEDURE — 71045 X-RAY EXAM CHEST 1 VIEW: CPT

## 2023-03-05 PROCEDURE — 85025 COMPLETE CBC W/AUTO DIFF WBC: CPT

## 2023-03-05 PROCEDURE — 83036 HEMOGLOBIN GLYCOSYLATED A1C: CPT

## 2023-03-05 PROCEDURE — 36415 COLL VENOUS BLD VENIPUNCTURE: CPT

## 2023-03-05 PROCEDURE — 71260 CT THORAX DX C+: CPT | Performed by: PHYSICIAN ASSISTANT

## 2023-03-05 RX ORDER — SODIUM CHLORIDE 9 MG/ML
INJECTION, SOLUTION INTRAVENOUS PRN
Status: DISCONTINUED | OUTPATIENT
Start: 2023-03-05 | End: 2023-03-21 | Stop reason: HOSPADM

## 2023-03-05 RX ORDER — INSULIN LISPRO 100 [IU]/ML
0-8 INJECTION, SOLUTION INTRAVENOUS; SUBCUTANEOUS
Status: DISCONTINUED | OUTPATIENT
Start: 2023-03-05 | End: 2023-03-08

## 2023-03-05 RX ORDER — POLYETHYLENE GLYCOL 3350 17 G/17G
17 POWDER, FOR SOLUTION ORAL DAILY PRN
Status: DISCONTINUED | OUTPATIENT
Start: 2023-03-05 | End: 2023-03-21 | Stop reason: HOSPADM

## 2023-03-05 RX ORDER — LEVOTHYROXINE SODIUM 0.1 MG/1
100 TABLET ORAL DAILY
COMMUNITY

## 2023-03-05 RX ORDER — ALBUTEROL SULFATE 90 UG/1
2 AEROSOL, METERED RESPIRATORY (INHALATION)
COMMUNITY
Start: 2020-05-13

## 2023-03-05 RX ORDER — ONDANSETRON 2 MG/ML
4 INJECTION INTRAMUSCULAR; INTRAVENOUS ONCE
Status: COMPLETED | OUTPATIENT
Start: 2023-03-05 | End: 2023-03-05

## 2023-03-05 RX ORDER — LANOLIN ALCOHOL/MO/W.PET/CERES
3 CREAM (GRAM) TOPICAL DAILY
Status: ON HOLD | COMMUNITY
End: 2023-03-21 | Stop reason: HOSPADM

## 2023-03-05 RX ORDER — SODIUM CHLORIDE 0.9 % (FLUSH) 0.9 %
5-40 SYRINGE (ML) INJECTION EVERY 12 HOURS SCHEDULED
Status: DISCONTINUED | OUTPATIENT
Start: 2023-03-05 | End: 2023-03-21 | Stop reason: HOSPADM

## 2023-03-05 RX ORDER — 0.9 % SODIUM CHLORIDE 0.9 %
500 INTRAVENOUS SOLUTION INTRAVENOUS ONCE
Status: COMPLETED | OUTPATIENT
Start: 2023-03-05 | End: 2023-03-05

## 2023-03-05 RX ORDER — ONDANSETRON 4 MG/1
4 TABLET, ORALLY DISINTEGRATING ORAL EVERY 8 HOURS PRN
Status: DISCONTINUED | OUTPATIENT
Start: 2023-03-05 | End: 2023-03-21 | Stop reason: HOSPADM

## 2023-03-05 RX ORDER — ACETAMINOPHEN 325 MG/1
650 TABLET ORAL EVERY 6 HOURS PRN
Status: DISCONTINUED | OUTPATIENT
Start: 2023-03-05 | End: 2023-03-21 | Stop reason: HOSPADM

## 2023-03-05 RX ORDER — TRAZODONE HYDROCHLORIDE 50 MG/1
50 TABLET ORAL NIGHTLY
COMMUNITY

## 2023-03-05 RX ORDER — CITALOPRAM 20 MG/1
10 TABLET ORAL DAILY
Status: DISCONTINUED | OUTPATIENT
Start: 2023-03-05 | End: 2023-03-05 | Stop reason: ALTCHOICE

## 2023-03-05 RX ORDER — FUROSEMIDE 10 MG/ML
40 INJECTION INTRAMUSCULAR; INTRAVENOUS ONCE
Status: COMPLETED | OUTPATIENT
Start: 2023-03-05 | End: 2023-03-05

## 2023-03-05 RX ORDER — HYDRALAZINE HYDROCHLORIDE 20 MG/ML
10 INJECTION INTRAMUSCULAR; INTRAVENOUS ONCE
Status: COMPLETED | OUTPATIENT
Start: 2023-03-05 | End: 2023-03-05

## 2023-03-05 RX ORDER — FUROSEMIDE 10 MG/ML
40 INJECTION INTRAMUSCULAR; INTRAVENOUS DAILY
Status: DISCONTINUED | OUTPATIENT
Start: 2023-03-06 | End: 2023-03-06

## 2023-03-05 RX ORDER — DEXTROSE MONOHYDRATE 100 MG/ML
INJECTION, SOLUTION INTRAVENOUS CONTINUOUS PRN
Status: DISCONTINUED | OUTPATIENT
Start: 2023-03-05 | End: 2023-03-21 | Stop reason: HOSPADM

## 2023-03-05 RX ORDER — ACETAMINOPHEN 650 MG/1
650 SUPPOSITORY RECTAL EVERY 6 HOURS PRN
Status: DISCONTINUED | OUTPATIENT
Start: 2023-03-05 | End: 2023-03-21 | Stop reason: HOSPADM

## 2023-03-05 RX ORDER — ATORVASTATIN CALCIUM 40 MG/1
40 TABLET, FILM COATED ORAL NIGHTLY
Status: DISCONTINUED | OUTPATIENT
Start: 2023-03-05 | End: 2023-03-21 | Stop reason: HOSPADM

## 2023-03-05 RX ORDER — AMIODARONE HYDROCHLORIDE 200 MG/1
200 TABLET ORAL DAILY
Status: DISCONTINUED | OUTPATIENT
Start: 2023-03-05 | End: 2023-03-21 | Stop reason: HOSPADM

## 2023-03-05 RX ORDER — AMLODIPINE BESYLATE 5 MG/1
10 TABLET ORAL DAILY
Status: DISCONTINUED | OUTPATIENT
Start: 2023-03-05 | End: 2023-03-05 | Stop reason: ALTCHOICE

## 2023-03-05 RX ORDER — SODIUM CHLORIDE 0.9 % (FLUSH) 0.9 %
5-40 SYRINGE (ML) INJECTION PRN
Status: DISCONTINUED | OUTPATIENT
Start: 2023-03-05 | End: 2023-03-21 | Stop reason: HOSPADM

## 2023-03-05 RX ORDER — ONDANSETRON 2 MG/ML
4 INJECTION INTRAMUSCULAR; INTRAVENOUS EVERY 6 HOURS PRN
Status: DISCONTINUED | OUTPATIENT
Start: 2023-03-05 | End: 2023-03-21 | Stop reason: HOSPADM

## 2023-03-05 RX ORDER — ASPIRIN 81 MG/1
81 TABLET ORAL DAILY
Status: DISCONTINUED | OUTPATIENT
Start: 2023-03-06 | End: 2023-03-21 | Stop reason: HOSPADM

## 2023-03-05 RX ORDER — CETIRIZINE HYDROCHLORIDE 10 MG/1
10 TABLET ORAL PRN
COMMUNITY

## 2023-03-05 RX ORDER — ALOGLIPTIN 6.25 MG/1
6.25 TABLET, FILM COATED ORAL DAILY
Status: DISCONTINUED | OUTPATIENT
Start: 2023-03-05 | End: 2023-03-21 | Stop reason: HOSPADM

## 2023-03-05 RX ORDER — INSULIN LISPRO 100 [IU]/ML
0-4 INJECTION, SOLUTION INTRAVENOUS; SUBCUTANEOUS NIGHTLY
Status: DISCONTINUED | OUTPATIENT
Start: 2023-03-05 | End: 2023-03-08

## 2023-03-05 RX ORDER — FUROSEMIDE 20 MG/1
40 TABLET ORAL 2 TIMES DAILY
Status: ON HOLD | COMMUNITY
End: 2023-03-21 | Stop reason: HOSPADM

## 2023-03-05 RX ADMIN — ATORVASTATIN CALCIUM 40 MG: 40 TABLET, FILM COATED ORAL at 21:13

## 2023-03-05 RX ADMIN — SODIUM CHLORIDE 500 ML: 9 INJECTION, SOLUTION INTRAVENOUS at 13:47

## 2023-03-05 RX ADMIN — APIXABAN 2.5 MG: 2.5 TABLET, FILM COATED ORAL at 21:21

## 2023-03-05 RX ADMIN — Medication 10 ML: at 21:20

## 2023-03-05 RX ADMIN — METOPROLOL TARTRATE 25 MG: 25 TABLET, FILM COATED ORAL at 18:47

## 2023-03-05 RX ADMIN — ALOGLIPTIN 6.25 MG: 6.25 TABLET, FILM COATED ORAL at 18:47

## 2023-03-05 RX ADMIN — HYDRALAZINE HYDROCHLORIDE 10 MG: 20 INJECTION INTRAMUSCULAR; INTRAVENOUS at 16:03

## 2023-03-05 RX ADMIN — FUROSEMIDE 40 MG: 10 INJECTION, SOLUTION INTRAMUSCULAR; INTRAVENOUS at 15:07

## 2023-03-05 RX ADMIN — INSULIN LISPRO 2 UNITS: 100 INJECTION, SOLUTION INTRAVENOUS; SUBCUTANEOUS at 17:05

## 2023-03-05 RX ADMIN — IOPAMIDOL 55 ML: 755 INJECTION, SOLUTION INTRAVENOUS at 14:05

## 2023-03-05 RX ADMIN — ONDANSETRON 4 MG: 2 INJECTION INTRAMUSCULAR; INTRAVENOUS at 13:47

## 2023-03-05 RX ADMIN — AMIODARONE HYDROCHLORIDE 200 MG: 200 TABLET ORAL at 16:58

## 2023-03-05 RX ADMIN — SODIUM CHLORIDE 5 MG/HR: 9 INJECTION, SOLUTION INTRAVENOUS at 21:18

## 2023-03-05 ASSESSMENT — ENCOUNTER SYMPTOMS
SHORTNESS OF BREATH: 1
VOMITING: 0
DIARRHEA: 1
COUGH: 1
NAUSEA: 1
WHEEZING: 0
STRIDOR: 0
CONSTIPATION: 0
BACK PAIN: 0
ABDOMINAL PAIN: 0
COLOR CHANGE: 0

## 2023-03-05 ASSESSMENT — PAIN SCALES - GENERAL: PAINLEVEL_OUTOF10: 0

## 2023-03-05 ASSESSMENT — PAIN - FUNCTIONAL ASSESSMENT: PAIN_FUNCTIONAL_ASSESSMENT: 0-10

## 2023-03-05 ASSESSMENT — LIFESTYLE VARIABLES: HOW OFTEN DO YOU HAVE A DRINK CONTAINING ALCOHOL: NEVER

## 2023-03-05 NOTE — ED PROVIDER NOTES
fibrillation ([address] EMERGENCY DEPARTMENT COURSE and DIFFERENTIAL DIAGNOSIS/MDM:   Vitals:    Vitals:    03/05/23 1253 03/05/23 1256 03/05/23 1313   BP: (!) 189/140 (!) 198/53 (!) 152/110   Pulse: 75 73 73   Resp: 19 20 21   Temp:  98.9 °F (37.2 °C)    TempSrc:  Oral    SpO2: 94% 95% 96%   Weight:  140 lb (63.5 kg)    Height:  5' 3\" (1.6 m)        Patient was given the following medications:  Medications   0.9 % sodium chloride bolus (500 mLs IntraVENous New Bag 3/5/23 1347)   furosemide (LASIX) injection 40 mg (has no administration in time range)   ondansetron (ZOFRAN) injection 4 mg (4 mg IntraVENous Given 3/5/23 1347)   iopamidol (ISOVUE-370) 76 % injection 55 mL (55 mLs IntraVENous Given 3/5/23 1405)             Is this patient to be included in the SEP-1 Core Measure due to severe sepsis or septic shock? No   Exclusion criteria - the patient is NOT to be included for SEP-1 Core Measure due to:  2+ SIRS criteria are not met    CONSULTS: (Who and What was discussed)  IP CONSULT TO HOSPITALIST  Discussion with Other Profesionals : Admitting Team hospitalist paged at [phone]    Social Determinants : None    Records Reviewed : Outpatient Notes recent internal medicine and nephrology notes from 1/2023 and 2/2023    CC/HPI Summary, DDx, ED Course, and Reassessment: This patient presents to the emergency department complaining of shortness of breath, nausea, diarrhea and left leg edema. Abdomen is soft and nontender in all 4 quadrants without pulsatile mass or CVA tenderness. She does have history of renal insufficiency. Both chest x-ray and CT scan of the chest confirmed pulmonary edema and does show evidence of pleural effusions. No evidence of PE or infection at this time on chest imaging. It is unclear if the light erythema to the left lower extremity is secondary to the edema or developing cellulitis. However patient has no leukocytosis or reported fever. One dose of IV lasix ordered at this time.  I do

## 2023-03-06 PROBLEM — I50.33 ACUTE ON CHRONIC DIASTOLIC CHF (CONGESTIVE HEART FAILURE) (HCC): Status: ACTIVE | Noted: 2023-03-06

## 2023-03-06 PROBLEM — I10 MALIGNANT HYPERTENSION: Status: ACTIVE | Noted: 2023-03-06

## 2023-03-06 LAB
ANION GAP SERPL CALCULATED.3IONS-SCNC: 8 MMOL/L (ref 3–16)
BUN BLDV-MCNC: 18 MG/DL (ref 7–20)
CALCIUM SERPL-MCNC: 8.4 MG/DL (ref 8.3–10.6)
CHLORIDE BLD-SCNC: 103 MMOL/L (ref 99–110)
CO2: 28 MMOL/L (ref 21–32)
CREAT SERPL-MCNC: 1.3 MG/DL (ref 0.6–1.2)
EKG ATRIAL RATE: 76 BPM
EKG DIAGNOSIS: NORMAL
EKG Q-T INTERVAL: 426 MS
EKG QRS DURATION: 84 MS
EKG QTC CALCULATION (BAZETT): 472 MS
EKG R AXIS: -59 DEGREES
EKG T AXIS: 40 DEGREES
EKG VENTRICULAR RATE: 74 BPM
ESTIMATED AVERAGE GLUCOSE: 177.2 MG/DL
GFR SERPL CREATININE-BSD FRML MDRD: 39 ML/MIN/{1.73_M2}
GLUCOSE BLD-MCNC: 153 MG/DL (ref 70–99)
GLUCOSE BLD-MCNC: 173 MG/DL (ref 70–99)
GLUCOSE BLD-MCNC: 177 MG/DL (ref 70–99)
GLUCOSE BLD-MCNC: 184 MG/DL (ref 70–99)
GLUCOSE BLD-MCNC: 410 MG/DL (ref 70–99)
GLUCOSE BLD-MCNC: 83 MG/DL (ref 70–99)
HBA1C MFR BLD: 7.8 %
HCT VFR BLD CALC: 30.7 % (ref 36–48)
HEMOGLOBIN: 9.6 G/DL (ref 12–16)
LV EF: 65 %
LVEF MODALITY: NORMAL
MCH RBC QN AUTO: 24.6 PG (ref 26–34)
MCHC RBC AUTO-ENTMCNC: 31.1 G/DL (ref 31–36)
MCV RBC AUTO: 79 FL (ref 80–100)
PDW BLD-RTO: 22.6 % (ref 12.4–15.4)
PERFORMED ON: ABNORMAL
PERFORMED ON: NORMAL
PLATELET # BLD: 202 K/UL (ref 135–450)
PMV BLD AUTO: 7.8 FL (ref 5–10.5)
POTASSIUM SERPL-SCNC: 3.9 MMOL/L (ref 3.5–5.1)
RBC # BLD: 3.89 M/UL (ref 4–5.2)
SODIUM BLD-SCNC: 139 MMOL/L (ref 136–145)
WBC # BLD: 4.9 K/UL (ref 4–11)

## 2023-03-06 PROCEDURE — 6370000000 HC RX 637 (ALT 250 FOR IP): Performed by: INTERNAL MEDICINE

## 2023-03-06 PROCEDURE — 80048 BASIC METABOLIC PNL TOTAL CA: CPT

## 2023-03-06 PROCEDURE — 2700000000 HC OXYGEN THERAPY PER DAY

## 2023-03-06 PROCEDURE — 2580000003 HC RX 258: Performed by: FAMILY MEDICINE

## 2023-03-06 PROCEDURE — 2500000003 HC RX 250 WO HCPCS: Performed by: FAMILY MEDICINE

## 2023-03-06 PROCEDURE — 6360000002 HC RX W HCPCS: Performed by: FAMILY MEDICINE

## 2023-03-06 PROCEDURE — 94760 N-INVAS EAR/PLS OXIMETRY 1: CPT

## 2023-03-06 PROCEDURE — 93010 ELECTROCARDIOGRAM REPORT: CPT | Performed by: INTERNAL MEDICINE

## 2023-03-06 PROCEDURE — 6360000002 HC RX W HCPCS: Performed by: INTERNAL MEDICINE

## 2023-03-06 PROCEDURE — 2000000000 HC ICU R&B

## 2023-03-06 PROCEDURE — 6370000000 HC RX 637 (ALT 250 FOR IP): Performed by: FAMILY MEDICINE

## 2023-03-06 PROCEDURE — 85027 COMPLETE CBC AUTOMATED: CPT

## 2023-03-06 PROCEDURE — 93306 TTE W/DOPPLER COMPLETE: CPT

## 2023-03-06 RX ORDER — TRAZODONE HYDROCHLORIDE 50 MG/1
50 TABLET ORAL NIGHTLY
Status: DISCONTINUED | OUTPATIENT
Start: 2023-03-06 | End: 2023-03-21 | Stop reason: HOSPADM

## 2023-03-06 RX ORDER — ALBUTEROL SULFATE 90 UG/1
2 AEROSOL, METERED RESPIRATORY (INHALATION) EVERY 4 HOURS PRN
Status: DISCONTINUED | OUTPATIENT
Start: 2023-03-06 | End: 2023-03-21 | Stop reason: HOSPADM

## 2023-03-06 RX ORDER — METOPROLOL SUCCINATE 50 MG/1
50 TABLET, EXTENDED RELEASE ORAL DAILY
Status: DISCONTINUED | OUTPATIENT
Start: 2023-03-06 | End: 2023-03-13

## 2023-03-06 RX ORDER — HYDRALAZINE HYDROCHLORIDE 20 MG/ML
10 INJECTION INTRAMUSCULAR; INTRAVENOUS EVERY 6 HOURS PRN
Status: DISCONTINUED | OUTPATIENT
Start: 2023-03-06 | End: 2023-03-08

## 2023-03-06 RX ORDER — LATANOPROST 50 UG/ML
1 SOLUTION/ DROPS OPHTHALMIC NIGHTLY
Status: DISCONTINUED | OUTPATIENT
Start: 2023-03-06 | End: 2023-03-21 | Stop reason: HOSPADM

## 2023-03-06 RX ORDER — FUROSEMIDE 10 MG/ML
40 INJECTION INTRAMUSCULAR; INTRAVENOUS 2 TIMES DAILY
Status: DISCONTINUED | OUTPATIENT
Start: 2023-03-06 | End: 2023-03-09

## 2023-03-06 RX ORDER — NIFEDIPINE 30 MG/1
30 TABLET, FILM COATED, EXTENDED RELEASE ORAL DAILY
Status: ON HOLD | COMMUNITY
End: 2023-03-21 | Stop reason: HOSPADM

## 2023-03-06 RX ORDER — CETIRIZINE HYDROCHLORIDE 10 MG/1
10 TABLET ORAL DAILY PRN
Status: DISCONTINUED | OUTPATIENT
Start: 2023-03-06 | End: 2023-03-21 | Stop reason: HOSPADM

## 2023-03-06 RX ORDER — LEVOTHYROXINE SODIUM 0.1 MG/1
100 TABLET ORAL
Status: DISCONTINUED | OUTPATIENT
Start: 2023-03-06 | End: 2023-03-21 | Stop reason: HOSPADM

## 2023-03-06 RX ORDER — NIFEDIPINE 30 MG/1
30 TABLET, FILM COATED, EXTENDED RELEASE ORAL DAILY
Status: DISCONTINUED | OUTPATIENT
Start: 2023-03-06 | End: 2023-03-06 | Stop reason: RX

## 2023-03-06 RX ORDER — NIFEDIPINE 30 MG/1
30 TABLET, EXTENDED RELEASE ORAL DAILY
Status: DISCONTINUED | OUTPATIENT
Start: 2023-03-06 | End: 2023-03-07

## 2023-03-06 RX ORDER — AZELASTINE HYDROCHLORIDE 0.5 MG/ML
1 SOLUTION/ DROPS OPHTHALMIC 2 TIMES DAILY
Status: DISCONTINUED | OUTPATIENT
Start: 2023-03-06 | End: 2023-03-21 | Stop reason: HOSPADM

## 2023-03-06 RX ADMIN — FUROSEMIDE 40 MG: 10 INJECTION, SOLUTION INTRAMUSCULAR; INTRAVENOUS at 09:20

## 2023-03-06 RX ADMIN — SODIUM CHLORIDE 7.5 MG/HR: 9 INJECTION, SOLUTION INTRAVENOUS at 01:12

## 2023-03-06 RX ADMIN — TRAZODONE HYDROCHLORIDE 50 MG: 50 TABLET ORAL at 20:31

## 2023-03-06 RX ADMIN — SODIUM CHLORIDE 7.5 MG/HR: 9 INJECTION, SOLUTION INTRAVENOUS at 04:49

## 2023-03-06 RX ADMIN — ATORVASTATIN CALCIUM 40 MG: 40 TABLET, FILM COATED ORAL at 20:31

## 2023-03-06 RX ADMIN — ASPIRIN 81 MG: 81 TABLET, COATED ORAL at 09:20

## 2023-03-06 RX ADMIN — ALOGLIPTIN 6.25 MG: 6.25 TABLET, FILM COATED ORAL at 09:20

## 2023-03-06 RX ADMIN — METOPROLOL TARTRATE 25 MG: 25 TABLET, FILM COATED ORAL at 09:20

## 2023-03-06 RX ADMIN — LATANOPROST 1 DROP: 50 SOLUTION OPHTHALMIC at 20:31

## 2023-03-06 RX ADMIN — AZELASTINE HYDROCHLORIDE 1 DROP: 0.5 SOLUTION/ DROPS INTRAOCULAR at 16:02

## 2023-03-06 RX ADMIN — NIFEDIPINE 30 MG: 30 TABLET, EXTENDED RELEASE ORAL at 15:55

## 2023-03-06 RX ADMIN — METOPROLOL SUCCINATE 50 MG: 50 TABLET, EXTENDED RELEASE ORAL at 15:55

## 2023-03-06 RX ADMIN — APIXABAN 2.5 MG: 2.5 TABLET, FILM COATED ORAL at 09:20

## 2023-03-06 RX ADMIN — Medication 10 ML: at 09:22

## 2023-03-06 RX ADMIN — INSULIN LISPRO 8 UNITS: 100 INJECTION, SOLUTION INTRAVENOUS; SUBCUTANEOUS at 12:39

## 2023-03-06 RX ADMIN — AZELASTINE HYDROCHLORIDE 1 DROP: 0.5 SOLUTION/ DROPS INTRAOCULAR at 20:31

## 2023-03-06 RX ADMIN — FUROSEMIDE 40 MG: 10 INJECTION, SOLUTION INTRAMUSCULAR; INTRAVENOUS at 20:31

## 2023-03-06 RX ADMIN — Medication 10 ML: at 21:45

## 2023-03-06 RX ADMIN — AMIODARONE HYDROCHLORIDE 200 MG: 200 TABLET ORAL at 09:20

## 2023-03-06 RX ADMIN — APIXABAN 2.5 MG: 2.5 TABLET, FILM COATED ORAL at 20:31

## 2023-03-06 NOTE — H&P
niCARdipine (CARDENE) 25 mg in sodium chloride 0.9 % 250 mL infusion  2.5-15 mg/hr IntraVENous Continuous Leola Celaya MD          Allergies: Allergies   Allergen Reactions    Chicken Meat (Diagnostic)         Social History:  Patient Lives    reports that she has never smoked. She has never used smokeless tobacco. She reports that she does not drink alcohol and does not use drugs. Family History:  Family history is unknown by patient. ,     Physical Exam:  BP (!) 195/63   Pulse 62   Temp 97.3 °F (36.3 °C) (Oral)   Resp 20   Ht 5' 3\" (1.6 m)   Wt 140 lb (63.5 kg)   SpO2 96%   BMI 24.80 kg/m²     General appearance:  Appears comfortable. Well nourished  Eyes: Sclera clear, pupils equal  ENT: Moist mucus membranes, no thrush. Trachea midline. Cardiovascular: Regular rhythm, normal S1, S2. No murmur, gallop, rub. No edema in lower extremities  Respiratory: Clear to auscultation bilaterally, no wheeze, good inspiratory effort  Gastrointestinal: Abdomen soft, non-tender, not distended, normal bowel sounds  Musculoskeletal: No cyanosis in digits, neck supple  Neurology: Cranial nerves grossly intact. Alert and oriented in time, place and person. No speech or motor deficits  Psychiatry: Appropriate affect.  Not agitated  Skin: Warm, dry, normal turgor, no rash  Brisk capillary refill, peripheral pulses palpable   Labs:  CBC:   Lab Results   Component Value Date/Time    WBC 6.7 03/05/2023 01:14 PM    RBC 4.37 03/05/2023 01:14 PM    HGB 10.6 03/05/2023 01:14 PM    HCT 34.4 03/05/2023 01:14 PM    MCV 78.8 03/05/2023 01:14 PM    MCH 24.3 03/05/2023 01:14 PM    MCHC 30.9 03/05/2023 01:14 PM    RDW 22.9 03/05/2023 01:14 PM     03/05/2023 01:14 PM    MPV 7.8 03/05/2023 01:14 PM     BMP:    Lab Results   Component Value Date/Time     03/05/2023 01:14 PM    K 4.6 03/05/2023 01:14 PM    K 5.0 06/26/2020 06:46 AM     03/05/2023 01:14 PM    CO2 27 03/05/2023 01:14 PM    BUN 19 03/05/2023 01:14 PM

## 2023-03-06 NOTE — FLOWSHEET NOTE
03/06/23 0830   Vitals   Temp 97.8 °F (36.6 °C)   Temp Source Temporal   Heart Rate 65   Heart Rate Source Monitor   Resp 20   BP (!) 157/54   MAP (Calculated) 88   MAP (mmHg) 82   BP Location Left upper arm   BP Upper/Lower Upper   BP Method Automatic   Patient Position Semi fowlers   Level of Consciousness 0   MEWS Score 1   Cardiac Rhythm 1° AV Block;Sinus tiara   Pain Assessment   Pain Assessment None - Denies Pain   Oxygen Therapy   SpO2 93 %   Pulse Oximetry Type Continuous   Pulse Oximeter Device Mode Continuous   Pulse Oximeter Device Location Finger   O2 Flow Rate (L/min) 2 L/min     Shift assessment complete. VSS. Pt. Is alert and oriented resting comfortably in bed. Pt. Denies SOB or chest pain at this time. POC discussed with patient and patient states understanding and is in agreement with plan. Bed alarm engaged. Call light and bedside table within reach. No further needs expressed at this time. Effie Palma RN

## 2023-03-06 NOTE — CARE COORDINATION
Chart reviewed at this time. Pt from home for sob. Therapy evaluations are pending at this time. CM will follow for d/c needs.       Shailesh Welch RN, BSN  [phone]

## 2023-03-07 LAB
ANION GAP SERPL CALCULATED.3IONS-SCNC: 7 MMOL/L (ref 3–16)
BUN BLDV-MCNC: 21 MG/DL (ref 7–20)
CALCIUM SERPL-MCNC: 8.5 MG/DL (ref 8.3–10.6)
CHLORIDE BLD-SCNC: 97 MMOL/L (ref 99–110)
CO2: 29 MMOL/L (ref 21–32)
CREAT SERPL-MCNC: 1.5 MG/DL (ref 0.6–1.2)
GFR SERPL CREATININE-BSD FRML MDRD: 33 ML/MIN/{1.73_M2}
GLUCOSE BLD-MCNC: 134 MG/DL (ref 70–99)
GLUCOSE BLD-MCNC: 158 MG/DL (ref 70–99)
GLUCOSE BLD-MCNC: 193 MG/DL (ref 70–99)
GLUCOSE BLD-MCNC: 246 MG/DL (ref 70–99)
GLUCOSE BLD-MCNC: 265 MG/DL (ref 70–99)
HCT VFR BLD CALC: 29.4 % (ref 36–48)
HEMOGLOBIN: 9.3 G/DL (ref 12–16)
MCH RBC QN AUTO: 24.9 PG (ref 26–34)
MCHC RBC AUTO-ENTMCNC: 31.7 G/DL (ref 31–36)
MCV RBC AUTO: 78.5 FL (ref 80–100)
PDW BLD-RTO: 22.5 % (ref 12.4–15.4)
PERFORMED ON: ABNORMAL
PLATELET # BLD: 228 K/UL (ref 135–450)
PMV BLD AUTO: 7.7 FL (ref 5–10.5)
POTASSIUM SERPL-SCNC: 3.7 MMOL/L (ref 3.5–5.1)
RBC # BLD: 3.75 M/UL (ref 4–5.2)
SODIUM BLD-SCNC: 133 MMOL/L (ref 136–145)
WBC # BLD: 6.6 K/UL (ref 4–11)

## 2023-03-07 PROCEDURE — 2000000000 HC ICU R&B

## 2023-03-07 PROCEDURE — 6360000002 HC RX W HCPCS: Performed by: INTERNAL MEDICINE

## 2023-03-07 PROCEDURE — 97116 GAIT TRAINING THERAPY: CPT

## 2023-03-07 PROCEDURE — 97165 OT EVAL LOW COMPLEX 30 MIN: CPT

## 2023-03-07 PROCEDURE — 85027 COMPLETE CBC AUTOMATED: CPT

## 2023-03-07 PROCEDURE — 92610 EVALUATE SWALLOWING FUNCTION: CPT

## 2023-03-07 PROCEDURE — 2580000003 HC RX 258: Performed by: FAMILY MEDICINE

## 2023-03-07 PROCEDURE — 6370000000 HC RX 637 (ALT 250 FOR IP): Performed by: FAMILY MEDICINE

## 2023-03-07 PROCEDURE — 6370000000 HC RX 637 (ALT 250 FOR IP): Performed by: STUDENT IN AN ORGANIZED HEALTH CARE EDUCATION/TRAINING PROGRAM

## 2023-03-07 PROCEDURE — 97161 PT EVAL LOW COMPLEX 20 MIN: CPT

## 2023-03-07 PROCEDURE — 97530 THERAPEUTIC ACTIVITIES: CPT

## 2023-03-07 PROCEDURE — 6370000000 HC RX 637 (ALT 250 FOR IP): Performed by: INTERNAL MEDICINE

## 2023-03-07 PROCEDURE — 80048 BASIC METABOLIC PNL TOTAL CA: CPT

## 2023-03-07 PROCEDURE — 92526 ORAL FUNCTION THERAPY: CPT

## 2023-03-07 PROCEDURE — 97535 SELF CARE MNGMENT TRAINING: CPT

## 2023-03-07 RX ORDER — GUAIFENESIN/DEXTROMETHORPHAN 100-10MG/5
5 SYRUP ORAL EVERY 4 HOURS PRN
Status: DISCONTINUED | OUTPATIENT
Start: 2023-03-07 | End: 2023-03-08

## 2023-03-07 RX ORDER — NIFEDIPINE 30 MG/1
30 TABLET, EXTENDED RELEASE ORAL 2 TIMES DAILY
Status: DISCONTINUED | OUTPATIENT
Start: 2023-03-07 | End: 2023-03-19

## 2023-03-07 RX ADMIN — NIFEDIPINE 30 MG: 30 TABLET, EXTENDED RELEASE ORAL at 20:06

## 2023-03-07 RX ADMIN — FUROSEMIDE 40 MG: 10 INJECTION, SOLUTION INTRAMUSCULAR; INTRAVENOUS at 08:24

## 2023-03-07 RX ADMIN — INSULIN LISPRO 4 UNITS: 100 INJECTION, SOLUTION INTRAVENOUS; SUBCUTANEOUS at 12:13

## 2023-03-07 RX ADMIN — AMIODARONE HYDROCHLORIDE 200 MG: 200 TABLET ORAL at 08:24

## 2023-03-07 RX ADMIN — LEVOTHYROXINE SODIUM 100 MCG: 100 TABLET ORAL at 09:12

## 2023-03-07 RX ADMIN — AZELASTINE HYDROCHLORIDE 1 DROP: 0.5 SOLUTION/ DROPS INTRAOCULAR at 20:07

## 2023-03-07 RX ADMIN — GUAIFENESIN AND DEXTROMETHORPHAN 5 ML: 100; 10 SYRUP ORAL at 21:16

## 2023-03-07 RX ADMIN — AZELASTINE HYDROCHLORIDE 1 DROP: 0.5 SOLUTION/ DROPS INTRAOCULAR at 09:00

## 2023-03-07 RX ADMIN — ASPIRIN 81 MG: 81 TABLET, COATED ORAL at 08:23

## 2023-03-07 RX ADMIN — LATANOPROST 1 DROP: 50 SOLUTION OPHTHALMIC at 20:07

## 2023-03-07 RX ADMIN — ALOGLIPTIN 6.25 MG: 6.25 TABLET, FILM COATED ORAL at 08:23

## 2023-03-07 RX ADMIN — FUROSEMIDE 40 MG: 10 INJECTION, SOLUTION INTRAMUSCULAR; INTRAVENOUS at 18:17

## 2023-03-07 RX ADMIN — TRAZODONE HYDROCHLORIDE 50 MG: 50 TABLET ORAL at 20:06

## 2023-03-07 RX ADMIN — GUAIFENESIN AND DEXTROMETHORPHAN 5 ML: 100; 10 SYRUP ORAL at 05:40

## 2023-03-07 RX ADMIN — Medication 5 ML: at 10:00

## 2023-03-07 RX ADMIN — NIFEDIPINE 30 MG: 30 TABLET, EXTENDED RELEASE ORAL at 08:24

## 2023-03-07 RX ADMIN — HYDRALAZINE HYDROCHLORIDE 10 MG: 20 INJECTION INTRAMUSCULAR; INTRAVENOUS at 12:15

## 2023-03-07 RX ADMIN — METOPROLOL SUCCINATE 50 MG: 50 TABLET, EXTENDED RELEASE ORAL at 08:24

## 2023-03-07 RX ADMIN — APIXABAN 2.5 MG: 2.5 TABLET, FILM COATED ORAL at 08:24

## 2023-03-07 RX ADMIN — APIXABAN 2.5 MG: 2.5 TABLET, FILM COATED ORAL at 20:06

## 2023-03-07 RX ADMIN — ATORVASTATIN CALCIUM 40 MG: 40 TABLET, FILM COATED ORAL at 20:06

## 2023-03-07 ASSESSMENT — PAIN SCALES - GENERAL
PAINLEVEL_OUTOF10: 0

## 2023-03-07 NOTE — CARE COORDINATION
Discharge Planning Note:      Patient states she is active with [facility] and she has 1 visit remaining. CM placed call to Charan Quiroz at [facility] who verifies that patient is active and they will need PT/OT notes. CM faxed notes to [facility]. Perfect Serve sent to physician requesting [facility] orders be placed.        Electronically signed by Yazmin Schmitt RN on 3/7/2023 at 3:23 PM

## 2023-03-07 NOTE — DISCHARGE INSTR - COC
Falls    Impairments/Disabilities:      Vision and Hearing    Nutrition Therapy:  Current Nutrition Therapy:   - Oral Diet:  General and Carb Control 4 carbs/meal (1800kcals/day)    Routes of Feeding: Oral  Liquids: No Restrictions  Daily Fluid Restriction: no  Last Modified Barium Swallow with Video (Video Swallowing Test): not done    Treatments at the Time of Hospital Discharge:   Respiratory Treatments: Breathing treatments  Oxygen Therapy:  is on oxygen at 2 L/min per nasal cannula. Ventilator:    - No ventilator support    Rehab Therapies: Physical Therapy and Occupational Therapy  Weight Bearing Status/Restrictions: No weight bearing restrictions  Other Medical Equipment (for information only, NOT a DME order):  walker  Other Treatments: n/a    Patient's personal belongings (please select all that are sent with patient):  Glasses, Hearing Aides left, Kyle    RN SIGNATURE:  Electronically signed by Samantha Landis RN on 3/17/23 at 10:36 AM EDT    CASE MANAGEMENT/SOCIAL WORK SECTION    Inpatient Status Date: 3/5/2023    Readmission Risk Assessment Score:  Readmission Risk              Risk of Unplanned Readmission:  17           Discharging to Facility/ [address] [place] [address]  Address: [address] [place] [address], [address] [place] [address]  Phone: [phone]  Fax: [phone]    / signature: DOUG Peguero, [phone]    PHYSICIAN SECTION    Prognosis: Fair    Condition at Discharge: Stable    Rehab Potential (if transferring to Rehab): Fair    Recommended Labs or Other Treatments After Discharge:     Physician Certification: I certify the above information and transfer of Oh Cox  is necessary for the continuing treatment of the diagnosis listed and that she requires [address] for less 30 days.      Update Admission H&P: No change in H&P    PHYSICIAN SIGNATURE:  Electronically signed by Diamond Carney MD on 3/7/23 at 3:52 PM EST

## 2023-03-08 ENCOUNTER — APPOINTMENT (OUTPATIENT)
Dept: GENERAL RADIOLOGY | Age: 88
DRG: 291 | End: 2023-03-08
Payer: MEDICARE

## 2023-03-08 PROBLEM — R06.02 SHORTNESS OF BREATH: Status: ACTIVE | Noted: 2023-03-08

## 2023-03-08 LAB
ANION GAP SERPL CALCULATED.3IONS-SCNC: 8 MMOL/L (ref 3–16)
BUN BLDV-MCNC: 24 MG/DL (ref 7–20)
CALCIUM SERPL-MCNC: 8.9 MG/DL (ref 8.3–10.6)
CHLORIDE BLD-SCNC: 98 MMOL/L (ref 99–110)
CO2: 29 MMOL/L (ref 21–32)
CREAT SERPL-MCNC: 1.5 MG/DL (ref 0.6–1.2)
GFR SERPL CREATININE-BSD FRML MDRD: 33 ML/MIN/{1.73_M2}
GLUCOSE BLD-MCNC: 229 MG/DL (ref 70–99)
GLUCOSE BLD-MCNC: 231 MG/DL (ref 70–99)
GLUCOSE BLD-MCNC: 299 MG/DL (ref 70–99)
GLUCOSE BLD-MCNC: 336 MG/DL (ref 70–99)
GLUCOSE BLD-MCNC: 342 MG/DL (ref 70–99)
HCT VFR BLD CALC: 32.3 % (ref 36–48)
HEMOGLOBIN: 10.2 G/DL (ref 12–16)
MCH RBC QN AUTO: 24.9 PG (ref 26–34)
MCHC RBC AUTO-ENTMCNC: 31.5 G/DL (ref 31–36)
MCV RBC AUTO: 79 FL (ref 80–100)
PDW BLD-RTO: 22.7 % (ref 12.4–15.4)
PERFORMED ON: ABNORMAL
PLATELET # BLD: 243 K/UL (ref 135–450)
PMV BLD AUTO: 7.9 FL (ref 5–10.5)
POTASSIUM SERPL-SCNC: 3.7 MMOL/L (ref 3.5–5.1)
RBC # BLD: 4.09 M/UL (ref 4–5.2)
SODIUM BLD-SCNC: 135 MMOL/L (ref 136–145)
WBC # BLD: 7 K/UL (ref 4–11)

## 2023-03-08 PROCEDURE — 6370000000 HC RX 637 (ALT 250 FOR IP): Performed by: INTERNAL MEDICINE

## 2023-03-08 PROCEDURE — 2700000000 HC OXYGEN THERAPY PER DAY

## 2023-03-08 PROCEDURE — 6360000002 HC RX W HCPCS: Performed by: INTERNAL MEDICINE

## 2023-03-08 PROCEDURE — 99291 CRITICAL CARE FIRST HOUR: CPT | Performed by: INTERNAL MEDICINE

## 2023-03-08 PROCEDURE — 92526 ORAL FUNCTION THERAPY: CPT

## 2023-03-08 PROCEDURE — 6370000000 HC RX 637 (ALT 250 FOR IP): Performed by: FAMILY MEDICINE

## 2023-03-08 PROCEDURE — 94640 AIRWAY INHALATION TREATMENT: CPT

## 2023-03-08 PROCEDURE — 97116 GAIT TRAINING THERAPY: CPT

## 2023-03-08 PROCEDURE — 97530 THERAPEUTIC ACTIVITIES: CPT

## 2023-03-08 PROCEDURE — 6370000000 HC RX 637 (ALT 250 FOR IP): Performed by: STUDENT IN AN ORGANIZED HEALTH CARE EDUCATION/TRAINING PROGRAM

## 2023-03-08 PROCEDURE — 2580000003 HC RX 258: Performed by: FAMILY MEDICINE

## 2023-03-08 PROCEDURE — 99223 1ST HOSP IP/OBS HIGH 75: CPT | Performed by: INTERNAL MEDICINE

## 2023-03-08 PROCEDURE — 94761 N-INVAS EAR/PLS OXIMETRY MLT: CPT

## 2023-03-08 PROCEDURE — 85027 COMPLETE CBC AUTOMATED: CPT

## 2023-03-08 PROCEDURE — 80048 BASIC METABOLIC PNL TOTAL CA: CPT

## 2023-03-08 PROCEDURE — 2000000000 HC ICU R&B

## 2023-03-08 PROCEDURE — 71045 X-RAY EXAM CHEST 1 VIEW: CPT

## 2023-03-08 RX ORDER — INSULIN GLARGINE 100 [IU]/ML
20 INJECTION, SOLUTION SUBCUTANEOUS
Status: DISCONTINUED | OUTPATIENT
Start: 2023-03-08 | End: 2023-03-13

## 2023-03-08 RX ORDER — INSULIN LISPRO 100 [IU]/ML
0-16 INJECTION, SOLUTION INTRAVENOUS; SUBCUTANEOUS
Status: DISCONTINUED | OUTPATIENT
Start: 2023-03-08 | End: 2023-03-15

## 2023-03-08 RX ORDER — HYDRALAZINE HYDROCHLORIDE 20 MG/ML
10 INJECTION INTRAMUSCULAR; INTRAVENOUS EVERY 6 HOURS PRN
Status: DISCONTINUED | OUTPATIENT
Start: 2023-03-08 | End: 2023-03-21 | Stop reason: HOSPADM

## 2023-03-08 RX ORDER — INSULIN LISPRO 100 [IU]/ML
0-4 INJECTION, SOLUTION INTRAVENOUS; SUBCUTANEOUS NIGHTLY
Status: DISCONTINUED | OUTPATIENT
Start: 2023-03-08 | End: 2023-03-15

## 2023-03-08 RX ORDER — METHYLPREDNISOLONE SODIUM SUCCINATE 40 MG/ML
40 INJECTION, POWDER, LYOPHILIZED, FOR SOLUTION INTRAMUSCULAR; INTRAVENOUS DAILY
Status: DISCONTINUED | OUTPATIENT
Start: 2023-03-09 | End: 2023-03-09

## 2023-03-08 RX ORDER — METHYLPREDNISOLONE SODIUM SUCCINATE 40 MG/ML
40 INJECTION, POWDER, LYOPHILIZED, FOR SOLUTION INTRAMUSCULAR; INTRAVENOUS EVERY 6 HOURS
Status: DISCONTINUED | OUTPATIENT
Start: 2023-03-08 | End: 2023-03-08

## 2023-03-08 RX ORDER — GUAIFENESIN 600 MG/1
600 TABLET, EXTENDED RELEASE ORAL 2 TIMES DAILY
Status: DISCONTINUED | OUTPATIENT
Start: 2023-03-08 | End: 2023-03-21 | Stop reason: HOSPADM

## 2023-03-08 RX ORDER — IPRATROPIUM BROMIDE AND ALBUTEROL SULFATE 2.5; .5 MG/3ML; MG/3ML
1 SOLUTION RESPIRATORY (INHALATION)
Status: DISCONTINUED | OUTPATIENT
Start: 2023-03-08 | End: 2023-03-11

## 2023-03-08 RX ORDER — METOPROLOL SUCCINATE 50 MG/1
50 TABLET, EXTENDED RELEASE ORAL DAILY
Status: ON HOLD | COMMUNITY
End: 2023-03-21 | Stop reason: HOSPADM

## 2023-03-08 RX ORDER — GUAIFENESIN/DEXTROMETHORPHAN 100-10MG/5
10 SYRUP ORAL EVERY 4 HOURS PRN
Status: DISCONTINUED | OUTPATIENT
Start: 2023-03-08 | End: 2023-03-21 | Stop reason: HOSPADM

## 2023-03-08 RX ADMIN — AZELASTINE HYDROCHLORIDE 1 DROP: 0.5 SOLUTION/ DROPS INTRAOCULAR at 20:44

## 2023-03-08 RX ADMIN — Medication 10 ML: at 09:00

## 2023-03-08 RX ADMIN — INSULIN LISPRO 8 UNITS: 100 INJECTION, SOLUTION INTRAVENOUS; SUBCUTANEOUS at 17:34

## 2023-03-08 RX ADMIN — IPRATROPIUM BROMIDE AND ALBUTEROL SULFATE 1 AMPULE: 2.5; .5 SOLUTION RESPIRATORY (INHALATION) at 16:10

## 2023-03-08 RX ADMIN — Medication 10 ML: at 21:04

## 2023-03-08 RX ADMIN — HYDRALAZINE HYDROCHLORIDE 10 MG: 20 INJECTION INTRAMUSCULAR; INTRAVENOUS at 12:43

## 2023-03-08 RX ADMIN — GUAIFENESIN AND DEXTROMETHORPHAN 10 ML: 100; 10 SYRUP ORAL at 20:43

## 2023-03-08 RX ADMIN — ALOGLIPTIN 6.25 MG: 6.25 TABLET, FILM COATED ORAL at 08:21

## 2023-03-08 RX ADMIN — TRAZODONE HYDROCHLORIDE 50 MG: 50 TABLET ORAL at 20:09

## 2023-03-08 RX ADMIN — APIXABAN 2.5 MG: 2.5 TABLET, FILM COATED ORAL at 08:21

## 2023-03-08 RX ADMIN — FUROSEMIDE 40 MG: 10 INJECTION, SOLUTION INTRAMUSCULAR; INTRAVENOUS at 17:35

## 2023-03-08 RX ADMIN — AMIODARONE HYDROCHLORIDE 200 MG: 200 TABLET ORAL at 08:21

## 2023-03-08 RX ADMIN — INSULIN LISPRO 4 UNITS: 100 INJECTION, SOLUTION INTRAVENOUS; SUBCUTANEOUS at 20:41

## 2023-03-08 RX ADMIN — GUAIFENESIN AND DEXTROMETHORPHAN 5 ML: 100; 10 SYRUP ORAL at 01:27

## 2023-03-08 RX ADMIN — IPRATROPIUM BROMIDE AND ALBUTEROL SULFATE 1 AMPULE: 2.5; .5 SOLUTION RESPIRATORY (INHALATION) at 13:01

## 2023-03-08 RX ADMIN — AZELASTINE HYDROCHLORIDE 1 DROP: 0.5 SOLUTION/ DROPS INTRAOCULAR at 09:00

## 2023-03-08 RX ADMIN — INSULIN LISPRO 6 UNITS: 100 INJECTION, SOLUTION INTRAVENOUS; SUBCUTANEOUS at 12:22

## 2023-03-08 RX ADMIN — ATORVASTATIN CALCIUM 40 MG: 40 TABLET, FILM COATED ORAL at 20:09

## 2023-03-08 RX ADMIN — ASPIRIN 81 MG: 81 TABLET, COATED ORAL at 08:21

## 2023-03-08 RX ADMIN — APIXABAN 2.5 MG: 2.5 TABLET, FILM COATED ORAL at 20:09

## 2023-03-08 RX ADMIN — IPRATROPIUM BROMIDE AND ALBUTEROL SULFATE 1 AMPULE: 2.5; .5 SOLUTION RESPIRATORY (INHALATION) at 08:56

## 2023-03-08 RX ADMIN — NIFEDIPINE 30 MG: 30 TABLET, EXTENDED RELEASE ORAL at 20:09

## 2023-03-08 RX ADMIN — SALINE NASAL SPRAY 1 SPRAY: 1.5 SOLUTION NASAL at 01:32

## 2023-03-08 RX ADMIN — METHYLPREDNISOLONE SODIUM SUCCINATE 40 MG: 40 INJECTION, POWDER, FOR SOLUTION INTRAMUSCULAR; INTRAVENOUS at 08:21

## 2023-03-08 RX ADMIN — NIFEDIPINE 30 MG: 30 TABLET, EXTENDED RELEASE ORAL at 08:21

## 2023-03-08 RX ADMIN — IPRATROPIUM BROMIDE AND ALBUTEROL SULFATE 1 AMPULE: 2.5; .5 SOLUTION RESPIRATORY (INHALATION) at 22:32

## 2023-03-08 RX ADMIN — GUAIFENESIN 600 MG: 600 TABLET, EXTENDED RELEASE ORAL at 20:09

## 2023-03-08 RX ADMIN — INSULIN LISPRO 2 UNITS: 100 INJECTION, SOLUTION INTRAVENOUS; SUBCUTANEOUS at 08:43

## 2023-03-08 RX ADMIN — LATANOPROST 1 DROP: 50 SOLUTION OPHTHALMIC at 20:44

## 2023-03-08 RX ADMIN — LEVOTHYROXINE SODIUM 100 MCG: 100 TABLET ORAL at 08:21

## 2023-03-08 RX ADMIN — INSULIN GLARGINE 20 UNITS: 100 INJECTION, SOLUTION SUBCUTANEOUS at 08:44

## 2023-03-08 RX ADMIN — GUAIFENESIN 600 MG: 600 TABLET, EXTENDED RELEASE ORAL at 08:21

## 2023-03-08 RX ADMIN — METOPROLOL SUCCINATE 50 MG: 50 TABLET, EXTENDED RELEASE ORAL at 08:21

## 2023-03-08 RX ADMIN — FUROSEMIDE 40 MG: 10 INJECTION, SOLUTION INTRAMUSCULAR; INTRAVENOUS at 08:20

## 2023-03-08 RX ADMIN — HYDRALAZINE HYDROCHLORIDE 10 MG: 20 INJECTION INTRAMUSCULAR; INTRAVENOUS at 00:27

## 2023-03-08 ASSESSMENT — PAIN SCALES - GENERAL
PAINLEVEL_OUTOF10: 0

## 2023-03-09 PROBLEM — D64.9 ANEMIA: Status: ACTIVE | Noted: 2023-03-09

## 2023-03-09 PROBLEM — I16.0 HYPERTENSIVE URGENCY: Status: ACTIVE | Noted: 2023-03-09

## 2023-03-09 PROBLEM — I48.20 CHRONIC ATRIAL FIBRILLATION (HCC): Status: ACTIVE | Noted: 2023-03-09

## 2023-03-09 LAB
ANION GAP SERPL CALCULATED.3IONS-SCNC: 9 MMOL/L (ref 3–16)
BUN BLDV-MCNC: 30 MG/DL (ref 7–20)
CALCIUM SERPL-MCNC: 8.5 MG/DL (ref 8.3–10.6)
CHLORIDE BLD-SCNC: 98 MMOL/L (ref 99–110)
CO2: 28 MMOL/L (ref 21–32)
CREAT SERPL-MCNC: 1.5 MG/DL (ref 0.6–1.2)
FERRITIN: 41.5 NG/ML (ref 15–150)
GFR SERPL CREATININE-BSD FRML MDRD: 33 ML/MIN/{1.73_M2}
GLUCOSE BLD-MCNC: 172 MG/DL (ref 70–99)
GLUCOSE BLD-MCNC: 188 MG/DL (ref 70–99)
GLUCOSE BLD-MCNC: 247 MG/DL (ref 70–99)
GLUCOSE BLD-MCNC: 290 MG/DL (ref 70–99)
GLUCOSE BLD-MCNC: 363 MG/DL (ref 70–99)
HCT VFR BLD CALC: 27.7 % (ref 36–48)
HEMOGLOBIN: 8.7 G/DL (ref 12–16)
IRON SATURATION: 7 % (ref 15–50)
IRON: 18 UG/DL (ref 37–145)
MCH RBC QN AUTO: 24.4 PG (ref 26–34)
MCHC RBC AUTO-ENTMCNC: 31.2 G/DL (ref 31–36)
MCV RBC AUTO: 78.3 FL (ref 80–100)
PDW BLD-RTO: 22.4 % (ref 12.4–15.4)
PERFORMED ON: ABNORMAL
PLATELET # BLD: 224 K/UL (ref 135–450)
PMV BLD AUTO: 7.8 FL (ref 5–10.5)
POTASSIUM SERPL-SCNC: 4.3 MMOL/L (ref 3.5–5.1)
PRO-BNP: ABNORMAL PG/ML (ref 0–449)
RBC # BLD: 3.54 M/UL (ref 4–5.2)
SODIUM BLD-SCNC: 135 MMOL/L (ref 136–145)
TOTAL IRON BINDING CAPACITY: 241 UG/DL (ref 260–445)
TSH REFLEX: 2.78 UIU/ML (ref 0.27–4.2)
WBC # BLD: 5.6 K/UL (ref 4–11)

## 2023-03-09 PROCEDURE — 2580000003 HC RX 258: Performed by: INTERNAL MEDICINE

## 2023-03-09 PROCEDURE — 99233 SBSQ HOSP IP/OBS HIGH 50: CPT | Performed by: CLINICAL NURSE SPECIALIST

## 2023-03-09 PROCEDURE — 6360000002 HC RX W HCPCS: Performed by: INTERNAL MEDICINE

## 2023-03-09 PROCEDURE — 84443 ASSAY THYROID STIM HORMONE: CPT

## 2023-03-09 PROCEDURE — 83550 IRON BINDING TEST: CPT

## 2023-03-09 PROCEDURE — 2580000003 HC RX 258: Performed by: CLINICAL NURSE SPECIALIST

## 2023-03-09 PROCEDURE — 83540 ASSAY OF IRON: CPT

## 2023-03-09 PROCEDURE — 97116 GAIT TRAINING THERAPY: CPT

## 2023-03-09 PROCEDURE — 99291 CRITICAL CARE FIRST HOUR: CPT | Performed by: INTERNAL MEDICINE

## 2023-03-09 PROCEDURE — 94640 AIRWAY INHALATION TREATMENT: CPT

## 2023-03-09 PROCEDURE — 92526 ORAL FUNCTION THERAPY: CPT

## 2023-03-09 PROCEDURE — 94761 N-INVAS EAR/PLS OXIMETRY MLT: CPT

## 2023-03-09 PROCEDURE — 80151 DRUG ASSAY AMIODARONE: CPT

## 2023-03-09 PROCEDURE — 2700000000 HC OXYGEN THERAPY PER DAY

## 2023-03-09 PROCEDURE — 97530 THERAPEUTIC ACTIVITIES: CPT

## 2023-03-09 PROCEDURE — 83880 ASSAY OF NATRIURETIC PEPTIDE: CPT

## 2023-03-09 PROCEDURE — 85027 COMPLETE CBC AUTOMATED: CPT

## 2023-03-09 PROCEDURE — 6360000002 HC RX W HCPCS: Performed by: CLINICAL NURSE SPECIALIST

## 2023-03-09 PROCEDURE — 6370000000 HC RX 637 (ALT 250 FOR IP): Performed by: INTERNAL MEDICINE

## 2023-03-09 PROCEDURE — 2000000000 HC ICU R&B

## 2023-03-09 PROCEDURE — 36415 COLL VENOUS BLD VENIPUNCTURE: CPT

## 2023-03-09 PROCEDURE — 80048 BASIC METABOLIC PNL TOTAL CA: CPT

## 2023-03-09 PROCEDURE — 82728 ASSAY OF FERRITIN: CPT

## 2023-03-09 PROCEDURE — 6370000000 HC RX 637 (ALT 250 FOR IP): Performed by: FAMILY MEDICINE

## 2023-03-09 PROCEDURE — 2580000003 HC RX 258: Performed by: FAMILY MEDICINE

## 2023-03-09 RX ORDER — HYDRALAZINE HYDROCHLORIDE 25 MG/1
50 TABLET, FILM COATED ORAL EVERY 8 HOURS SCHEDULED
Status: DISCONTINUED | OUTPATIENT
Start: 2023-03-09 | End: 2023-03-13

## 2023-03-09 RX ADMIN — METOPROLOL SUCCINATE 50 MG: 50 TABLET, EXTENDED RELEASE ORAL at 08:28

## 2023-03-09 RX ADMIN — TRAZODONE HYDROCHLORIDE 50 MG: 50 TABLET ORAL at 22:12

## 2023-03-09 RX ADMIN — IPRATROPIUM BROMIDE AND ALBUTEROL SULFATE 1 AMPULE: 2.5; .5 SOLUTION RESPIRATORY (INHALATION) at 07:41

## 2023-03-09 RX ADMIN — AMIODARONE HYDROCHLORIDE 200 MG: 200 TABLET ORAL at 08:29

## 2023-03-09 RX ADMIN — Medication 10 ML: at 08:28

## 2023-03-09 RX ADMIN — FUROSEMIDE 2.5 MG/HR: 10 INJECTION, SOLUTION INTRAMUSCULAR; INTRAVENOUS at 09:43

## 2023-03-09 RX ADMIN — LATANOPROST 1 DROP: 50 SOLUTION OPHTHALMIC at 20:41

## 2023-03-09 RX ADMIN — POLYETHYLENE GLYCOL 3350 17 G: 17 POWDER, FOR SOLUTION ORAL at 20:00

## 2023-03-09 RX ADMIN — ALOGLIPTIN 6.25 MG: 6.25 TABLET, FILM COATED ORAL at 08:28

## 2023-03-09 RX ADMIN — FUROSEMIDE 40 MG: 10 INJECTION, SOLUTION INTRAMUSCULAR; INTRAVENOUS at 08:28

## 2023-03-09 RX ADMIN — AZELASTINE HYDROCHLORIDE 1 DROP: 0.5 SOLUTION/ DROPS INTRAOCULAR at 08:31

## 2023-03-09 RX ADMIN — SODIUM CHLORIDE, PRESERVATIVE FREE 10 ML: 5 INJECTION INTRAVENOUS at 22:27

## 2023-03-09 RX ADMIN — INSULIN LISPRO 8 UNITS: 100 INJECTION, SOLUTION INTRAVENOUS; SUBCUTANEOUS at 15:50

## 2023-03-09 RX ADMIN — HYDRALAZINE HYDROCHLORIDE 50 MG: 25 TABLET, FILM COATED ORAL at 09:41

## 2023-03-09 RX ADMIN — NIFEDIPINE 30 MG: 30 TABLET, EXTENDED RELEASE ORAL at 20:00

## 2023-03-09 RX ADMIN — ATORVASTATIN CALCIUM 40 MG: 40 TABLET, FILM COATED ORAL at 20:01

## 2023-03-09 RX ADMIN — GUAIFENESIN 600 MG: 600 TABLET, EXTENDED RELEASE ORAL at 20:00

## 2023-03-09 RX ADMIN — APIXABAN 2.5 MG: 2.5 TABLET, FILM COATED ORAL at 08:29

## 2023-03-09 RX ADMIN — NIFEDIPINE 30 MG: 30 TABLET, EXTENDED RELEASE ORAL at 08:29

## 2023-03-09 RX ADMIN — Medication 10 ML: at 20:43

## 2023-03-09 RX ADMIN — AZELASTINE HYDROCHLORIDE 1 DROP: 0.5 SOLUTION/ DROPS INTRAOCULAR at 20:41

## 2023-03-09 RX ADMIN — IPRATROPIUM BROMIDE AND ALBUTEROL SULFATE 1 AMPULE: 2.5; .5 SOLUTION RESPIRATORY (INHALATION) at 11:20

## 2023-03-09 RX ADMIN — INSULIN LISPRO 16 UNITS: 100 INJECTION, SOLUTION INTRAVENOUS; SUBCUTANEOUS at 12:13

## 2023-03-09 RX ADMIN — HYDRALAZINE HYDROCHLORIDE 10 MG: 20 INJECTION INTRAMUSCULAR; INTRAVENOUS at 22:27

## 2023-03-09 RX ADMIN — ASPIRIN 81 MG: 81 TABLET, COATED ORAL at 08:29

## 2023-03-09 RX ADMIN — HYDRALAZINE HYDROCHLORIDE 10 MG: 20 INJECTION INTRAMUSCULAR; INTRAVENOUS at 04:12

## 2023-03-09 RX ADMIN — IPRATROPIUM BROMIDE AND ALBUTEROL SULFATE 1 AMPULE: 2.5; .5 SOLUTION RESPIRATORY (INHALATION) at 16:05

## 2023-03-09 RX ADMIN — IPRATROPIUM BROMIDE AND ALBUTEROL SULFATE 1 AMPULE: 2.5; .5 SOLUTION RESPIRATORY (INHALATION) at 20:21

## 2023-03-09 RX ADMIN — LEVOTHYROXINE SODIUM 100 MCG: 100 TABLET ORAL at 08:29

## 2023-03-09 RX ADMIN — INSULIN GLARGINE 20 UNITS: 100 INJECTION, SOLUTION SUBCUTANEOUS at 08:29

## 2023-03-09 RX ADMIN — APIXABAN 2.5 MG: 2.5 TABLET, FILM COATED ORAL at 20:00

## 2023-03-09 RX ADMIN — IRON SUCROSE 200 MG: 20 INJECTION, SOLUTION INTRAVENOUS at 13:12

## 2023-03-09 RX ADMIN — METHYLPREDNISOLONE SODIUM SUCCINATE 40 MG: 40 INJECTION, POWDER, FOR SOLUTION INTRAMUSCULAR; INTRAVENOUS at 08:28

## 2023-03-09 RX ADMIN — GUAIFENESIN 600 MG: 600 TABLET, EXTENDED RELEASE ORAL at 08:28

## 2023-03-09 RX ADMIN — HYDRALAZINE HYDROCHLORIDE 50 MG: 25 TABLET, FILM COATED ORAL at 20:01

## 2023-03-09 NOTE — CARE COORDINATION
Per IDR today possible discharge tomorrow. Pt is active with [facility]. Cm called [facility] [phone] and spoke to Addison and updated her and informed her  orders are in chart. She will have someone go into epic for orders. She has CM number in case any needs.       Cristian Lowe RN, BSN  [phone]

## 2023-03-09 NOTE — DISCHARGE INSTRUCTIONS
0 =  []  1 =    [x] 2 =    [] 3 =    []                                                                                                                                                              Sitting inactive in public place (e.g., a theater or a meeting)                                                            0 =  [x]  1 =    [] 2 =    [] 3 =    []     As a passenger in a car for an hour without a break                                                                        0  =  [x]  1 =    [] 2 =    [] 3 =    []     Lying down to rest in the afternoon when circumstances permit                                                      0 =  []  1 =    [x] 2 =    [] 3 =    []     Sitting and talking to someone                                                                                                          0 =  [x]  1 =    [] 2 =    [] 3 =    []                            Sitting quietly after a lunch without alcohol                                                                                       0 =  [x]  1 =    [] 2 =    [] 3 =    []     In a car, while stopped for a few minutes in traffic                                                                            0 =  [x]  1 =    [] 2 =    [] 3 =    []     Total Score = 5     If your total score is 10 or greater, you are experiencing excessive sleepiness and should consider seeking a medical follow-up. Take a copy of this screening test to your primary care physician on your next office visit. Interpretation:       0 -   7: It is unlikely that you are abnormally sleepy. 8 -   9: You have an average amount of daytime sleepiness. 10 - 15: You may be excessively sleepy depending on the situation. You may want to consider seeking medical attention. 16 - 24: You are excessively sleepy and should consider seeking medical attention.

## 2023-03-10 LAB
AMIODARONE LEVEL: 0.6 UG/ML (ref 0.5–2)
ANION GAP SERPL CALCULATED.3IONS-SCNC: 9 MMOL/L (ref 3–16)
BUN BLDV-MCNC: 38 MG/DL (ref 7–20)
CALCIUM SERPL-MCNC: 8.5 MG/DL (ref 8.3–10.6)
CHLORIDE BLD-SCNC: 95 MMOL/L (ref 99–110)
CO2: 27 MMOL/L (ref 21–32)
CREAT SERPL-MCNC: 1.6 MG/DL (ref 0.6–1.2)
DES-AMIOD: 0.8 UG/ML
GFR SERPL CREATININE-BSD FRML MDRD: 31 ML/MIN/{1.73_M2}
GLUCOSE BLD-MCNC: 104 MG/DL (ref 70–99)
GLUCOSE BLD-MCNC: 146 MG/DL (ref 70–99)
GLUCOSE BLD-MCNC: 147 MG/DL (ref 70–99)
GLUCOSE BLD-MCNC: 236 MG/DL (ref 70–99)
GLUCOSE BLD-MCNC: 78 MG/DL (ref 70–99)
HCT VFR BLD CALC: 28.5 % (ref 36–48)
HEMOGLOBIN: 8.8 G/DL (ref 12–16)
MCH RBC QN AUTO: 23.9 PG (ref 26–34)
MCHC RBC AUTO-ENTMCNC: 30.8 G/DL (ref 31–36)
MCV RBC AUTO: 77.8 FL (ref 80–100)
PDW BLD-RTO: 23.2 % (ref 12.4–15.4)
PERFORMED ON: ABNORMAL
PERFORMED ON: NORMAL
PLATELET # BLD: 254 K/UL (ref 135–450)
PMV BLD AUTO: 7.7 FL (ref 5–10.5)
POTASSIUM SERPL-SCNC: 3.9 MMOL/L (ref 3.5–5.1)
POTASSIUM SERPL-SCNC: 4.4 MMOL/L (ref 3.5–5.1)
POTASSIUM SERPL-SCNC: 4.7 MMOL/L (ref 3.5–5.1)
RBC # BLD: 3.66 M/UL (ref 4–5.2)
SODIUM BLD-SCNC: 131 MMOL/L (ref 136–145)
WBC # BLD: 7.2 K/UL (ref 4–11)

## 2023-03-10 PROCEDURE — 2000000000 HC ICU R&B

## 2023-03-10 PROCEDURE — 92526 ORAL FUNCTION THERAPY: CPT

## 2023-03-10 PROCEDURE — 2580000003 HC RX 258: Performed by: CLINICAL NURSE SPECIALIST

## 2023-03-10 PROCEDURE — 2700000000 HC OXYGEN THERAPY PER DAY

## 2023-03-10 PROCEDURE — 6370000000 HC RX 637 (ALT 250 FOR IP): Performed by: INTERNAL MEDICINE

## 2023-03-10 PROCEDURE — 84132 ASSAY OF SERUM POTASSIUM: CPT

## 2023-03-10 PROCEDURE — 6370000000 HC RX 637 (ALT 250 FOR IP): Performed by: FAMILY MEDICINE

## 2023-03-10 PROCEDURE — 94761 N-INVAS EAR/PLS OXIMETRY MLT: CPT

## 2023-03-10 PROCEDURE — 80048 BASIC METABOLIC PNL TOTAL CA: CPT

## 2023-03-10 PROCEDURE — 99233 SBSQ HOSP IP/OBS HIGH 50: CPT | Performed by: INTERNAL MEDICINE

## 2023-03-10 PROCEDURE — 2580000003 HC RX 258: Performed by: FAMILY MEDICINE

## 2023-03-10 PROCEDURE — 94640 AIRWAY INHALATION TREATMENT: CPT

## 2023-03-10 PROCEDURE — 85027 COMPLETE CBC AUTOMATED: CPT

## 2023-03-10 PROCEDURE — 99233 SBSQ HOSP IP/OBS HIGH 50: CPT | Performed by: CLINICAL NURSE SPECIALIST

## 2023-03-10 PROCEDURE — 6360000002 HC RX W HCPCS: Performed by: CLINICAL NURSE SPECIALIST

## 2023-03-10 RX ORDER — METOLAZONE 2.5 MG/1
2.5 TABLET ORAL ONCE
Status: COMPLETED | OUTPATIENT
Start: 2023-03-10 | End: 2023-03-10

## 2023-03-10 RX ADMIN — ATORVASTATIN CALCIUM 40 MG: 40 TABLET, FILM COATED ORAL at 20:44

## 2023-03-10 RX ADMIN — METOPROLOL SUCCINATE 50 MG: 50 TABLET, EXTENDED RELEASE ORAL at 08:03

## 2023-03-10 RX ADMIN — IRON SUCROSE 200 MG: 20 INJECTION, SOLUTION INTRAVENOUS at 13:22

## 2023-03-10 RX ADMIN — HYDRALAZINE HYDROCHLORIDE 50 MG: 25 TABLET, FILM COATED ORAL at 20:44

## 2023-03-10 RX ADMIN — IPRATROPIUM BROMIDE AND ALBUTEROL SULFATE 1 AMPULE: 2.5; .5 SOLUTION RESPIRATORY (INHALATION) at 15:21

## 2023-03-10 RX ADMIN — IPRATROPIUM BROMIDE AND ALBUTEROL SULFATE 1 AMPULE: 2.5; .5 SOLUTION RESPIRATORY (INHALATION) at 11:52

## 2023-03-10 RX ADMIN — Medication 10 ML: at 20:47

## 2023-03-10 RX ADMIN — AZELASTINE HYDROCHLORIDE 1 DROP: 0.5 SOLUTION/ DROPS INTRAOCULAR at 20:46

## 2023-03-10 RX ADMIN — HYDRALAZINE HYDROCHLORIDE 50 MG: 25 TABLET, FILM COATED ORAL at 06:30

## 2023-03-10 RX ADMIN — NIFEDIPINE 30 MG: 30 TABLET, EXTENDED RELEASE ORAL at 08:03

## 2023-03-10 RX ADMIN — INSULIN LISPRO 4 UNITS: 100 INJECTION, SOLUTION INTRAVENOUS; SUBCUTANEOUS at 11:50

## 2023-03-10 RX ADMIN — AZELASTINE HYDROCHLORIDE 1 DROP: 0.5 SOLUTION/ DROPS INTRAOCULAR at 08:05

## 2023-03-10 RX ADMIN — APIXABAN 2.5 MG: 2.5 TABLET, FILM COATED ORAL at 20:44

## 2023-03-10 RX ADMIN — GUAIFENESIN 600 MG: 600 TABLET, EXTENDED RELEASE ORAL at 20:44

## 2023-03-10 RX ADMIN — LEVOTHYROXINE SODIUM 100 MCG: 100 TABLET ORAL at 06:30

## 2023-03-10 RX ADMIN — ALOGLIPTIN 6.25 MG: 6.25 TABLET, FILM COATED ORAL at 08:03

## 2023-03-10 RX ADMIN — ASPIRIN 81 MG: 81 TABLET, COATED ORAL at 08:03

## 2023-03-10 RX ADMIN — GUAIFENESIN 600 MG: 600 TABLET, EXTENDED RELEASE ORAL at 08:03

## 2023-03-10 RX ADMIN — AMIODARONE HYDROCHLORIDE 200 MG: 200 TABLET ORAL at 08:03

## 2023-03-10 RX ADMIN — NIFEDIPINE 30 MG: 30 TABLET, EXTENDED RELEASE ORAL at 20:44

## 2023-03-10 RX ADMIN — LATANOPROST 1 DROP: 50 SOLUTION OPHTHALMIC at 20:46

## 2023-03-10 RX ADMIN — METOLAZONE 2.5 MG: 2.5 TABLET ORAL at 09:29

## 2023-03-10 RX ADMIN — IPRATROPIUM BROMIDE AND ALBUTEROL SULFATE 1 AMPULE: 2.5; .5 SOLUTION RESPIRATORY (INHALATION) at 08:01

## 2023-03-10 RX ADMIN — APIXABAN 2.5 MG: 2.5 TABLET, FILM COATED ORAL at 08:03

## 2023-03-10 RX ADMIN — Medication 2 PUFF: at 04:34

## 2023-03-10 RX ADMIN — IPRATROPIUM BROMIDE AND ALBUTEROL SULFATE 1 AMPULE: 2.5; .5 SOLUTION RESPIRATORY (INHALATION) at 20:10

## 2023-03-10 RX ADMIN — Medication 10 ML: at 08:03

## 2023-03-10 RX ADMIN — INSULIN GLARGINE 20 UNITS: 100 INJECTION, SOLUTION SUBCUTANEOUS at 08:05

## 2023-03-10 RX ADMIN — TRAZODONE HYDROCHLORIDE 50 MG: 50 TABLET ORAL at 20:44

## 2023-03-10 RX ADMIN — HYDRALAZINE HYDROCHLORIDE 50 MG: 25 TABLET, FILM COATED ORAL at 13:19

## 2023-03-10 NOTE — CARE COORDINATION
03/10/23 1437   IMM Letter   IMM Letter given to Patient/Family/Significant other/Guardian/POA/by: Damion Pham RN CM   IMM Letter date given: 03/10/23   IMM Letter time given: 0400  (copy made for hard chart)

## 2023-03-11 LAB
ANION GAP SERPL CALCULATED.3IONS-SCNC: 9 MMOL/L (ref 3–16)
BUN BLDV-MCNC: 41 MG/DL (ref 7–20)
CALCIUM SERPL-MCNC: 8.7 MG/DL (ref 8.3–10.6)
CHLORIDE BLD-SCNC: 96 MMOL/L (ref 99–110)
CO2: 28 MMOL/L (ref 21–32)
CREAT SERPL-MCNC: 1.6 MG/DL (ref 0.6–1.2)
GFR SERPL CREATININE-BSD FRML MDRD: 31 ML/MIN/{1.73_M2}
GLUCOSE BLD-MCNC: 104 MG/DL (ref 70–99)
GLUCOSE BLD-MCNC: 121 MG/DL (ref 70–99)
GLUCOSE BLD-MCNC: 146 MG/DL (ref 70–99)
GLUCOSE BLD-MCNC: 66 MG/DL (ref 70–99)
GLUCOSE BLD-MCNC: 83 MG/DL (ref 70–99)
HCT VFR BLD CALC: 28.7 % (ref 36–48)
HEMOGLOBIN: 8.9 G/DL (ref 12–16)
MCH RBC QN AUTO: 24.2 PG (ref 26–34)
MCHC RBC AUTO-ENTMCNC: 31.1 G/DL (ref 31–36)
MCV RBC AUTO: 78 FL (ref 80–100)
PDW BLD-RTO: 23.1 % (ref 12.4–15.4)
PERFORMED ON: ABNORMAL
PERFORMED ON: NORMAL
PLATELET # BLD: 271 K/UL (ref 135–450)
PMV BLD AUTO: 7.7 FL (ref 5–10.5)
POTASSIUM SERPL-SCNC: 3.8 MMOL/L (ref 3.5–5.1)
PRO-BNP: ABNORMAL PG/ML (ref 0–449)
RBC # BLD: 3.69 M/UL (ref 4–5.2)
SODIUM BLD-SCNC: 133 MMOL/L (ref 136–145)
WBC # BLD: 7.3 K/UL (ref 4–11)

## 2023-03-11 PROCEDURE — 2700000000 HC OXYGEN THERAPY PER DAY

## 2023-03-11 PROCEDURE — 99233 SBSQ HOSP IP/OBS HIGH 50: CPT | Performed by: NURSE PRACTITIONER

## 2023-03-11 PROCEDURE — 94761 N-INVAS EAR/PLS OXIMETRY MLT: CPT

## 2023-03-11 PROCEDURE — 2000000000 HC ICU R&B

## 2023-03-11 PROCEDURE — 6370000000 HC RX 637 (ALT 250 FOR IP): Performed by: INTERNAL MEDICINE

## 2023-03-11 PROCEDURE — 6360000002 HC RX W HCPCS: Performed by: CLINICAL NURSE SPECIALIST

## 2023-03-11 PROCEDURE — 85027 COMPLETE CBC AUTOMATED: CPT

## 2023-03-11 PROCEDURE — 80048 BASIC METABOLIC PNL TOTAL CA: CPT

## 2023-03-11 PROCEDURE — 6370000000 HC RX 637 (ALT 250 FOR IP): Performed by: FAMILY MEDICINE

## 2023-03-11 PROCEDURE — 2580000003 HC RX 258: Performed by: FAMILY MEDICINE

## 2023-03-11 PROCEDURE — 2580000003 HC RX 258: Performed by: CLINICAL NURSE SPECIALIST

## 2023-03-11 PROCEDURE — 94640 AIRWAY INHALATION TREATMENT: CPT

## 2023-03-11 PROCEDURE — 83880 ASSAY OF NATRIURETIC PEPTIDE: CPT

## 2023-03-11 PROCEDURE — 36415 COLL VENOUS BLD VENIPUNCTURE: CPT

## 2023-03-11 RX ORDER — IPRATROPIUM BROMIDE AND ALBUTEROL SULFATE 2.5; .5 MG/3ML; MG/3ML
1 SOLUTION RESPIRATORY (INHALATION) 2 TIMES DAILY
Status: DISCONTINUED | OUTPATIENT
Start: 2023-03-11 | End: 2023-03-14

## 2023-03-11 RX ADMIN — ATORVASTATIN CALCIUM 40 MG: 40 TABLET, FILM COATED ORAL at 20:37

## 2023-03-11 RX ADMIN — TRAZODONE HYDROCHLORIDE 50 MG: 50 TABLET ORAL at 20:55

## 2023-03-11 RX ADMIN — Medication 2 PUFF: at 00:19

## 2023-03-11 RX ADMIN — IPRATROPIUM BROMIDE AND ALBUTEROL SULFATE 1 AMPULE: .5; 3 SOLUTION RESPIRATORY (INHALATION) at 20:10

## 2023-03-11 RX ADMIN — Medication 10 ML: at 20:48

## 2023-03-11 RX ADMIN — METOPROLOL SUCCINATE 50 MG: 50 TABLET, EXTENDED RELEASE ORAL at 09:17

## 2023-03-11 RX ADMIN — NIFEDIPINE 30 MG: 30 TABLET, EXTENDED RELEASE ORAL at 20:55

## 2023-03-11 RX ADMIN — IRON SUCROSE 200 MG: 20 INJECTION, SOLUTION INTRAVENOUS at 17:09

## 2023-03-11 RX ADMIN — APIXABAN 2.5 MG: 2.5 TABLET, FILM COATED ORAL at 09:18

## 2023-03-11 RX ADMIN — GUAIFENESIN 600 MG: 600 TABLET, EXTENDED RELEASE ORAL at 09:17

## 2023-03-11 RX ADMIN — LATANOPROST 1 DROP: 50 SOLUTION OPHTHALMIC at 20:48

## 2023-03-11 RX ADMIN — HYDRALAZINE HYDROCHLORIDE 50 MG: 25 TABLET, FILM COATED ORAL at 14:00

## 2023-03-11 RX ADMIN — INSULIN GLARGINE 20 UNITS: 100 INJECTION, SOLUTION SUBCUTANEOUS at 09:22

## 2023-03-11 RX ADMIN — IPRATROPIUM BROMIDE AND ALBUTEROL SULFATE 1 AMPULE: 2.5; .5 SOLUTION RESPIRATORY (INHALATION) at 11:41

## 2023-03-11 RX ADMIN — AZELASTINE HYDROCHLORIDE 1 DROP: 0.5 SOLUTION/ DROPS INTRAOCULAR at 20:38

## 2023-03-11 RX ADMIN — NIFEDIPINE 30 MG: 30 TABLET, EXTENDED RELEASE ORAL at 09:17

## 2023-03-11 RX ADMIN — HYDRALAZINE HYDROCHLORIDE 50 MG: 25 TABLET, FILM COATED ORAL at 06:07

## 2023-03-11 RX ADMIN — ASPIRIN 81 MG: 81 TABLET, COATED ORAL at 09:17

## 2023-03-11 RX ADMIN — HYDRALAZINE HYDROCHLORIDE 50 MG: 25 TABLET, FILM COATED ORAL at 20:55

## 2023-03-11 RX ADMIN — ALOGLIPTIN 6.25 MG: 6.25 TABLET, FILM COATED ORAL at 09:17

## 2023-03-11 RX ADMIN — AMIODARONE HYDROCHLORIDE 200 MG: 200 TABLET ORAL at 09:17

## 2023-03-11 RX ADMIN — IPRATROPIUM BROMIDE AND ALBUTEROL SULFATE 1 AMPULE: 2.5; .5 SOLUTION RESPIRATORY (INHALATION) at 16:54

## 2023-03-11 RX ADMIN — GUAIFENESIN AND DEXTROMETHORPHAN 10 ML: 100; 10 SYRUP ORAL at 00:12

## 2023-03-11 RX ADMIN — GUAIFENESIN 600 MG: 600 TABLET, EXTENDED RELEASE ORAL at 20:37

## 2023-03-11 RX ADMIN — Medication 10 ML: at 09:21

## 2023-03-11 RX ADMIN — APIXABAN 2.5 MG: 2.5 TABLET, FILM COATED ORAL at 20:37

## 2023-03-11 RX ADMIN — LEVOTHYROXINE SODIUM 100 MCG: 100 TABLET ORAL at 06:07

## 2023-03-11 RX ADMIN — IPRATROPIUM BROMIDE AND ALBUTEROL SULFATE 1 AMPULE: 2.5; .5 SOLUTION RESPIRATORY (INHALATION) at 08:33

## 2023-03-11 RX ADMIN — AZELASTINE HYDROCHLORIDE 1 DROP: 0.5 SOLUTION/ DROPS INTRAOCULAR at 09:21

## 2023-03-11 RX ADMIN — POLYETHYLENE GLYCOL 3350 17 G: 17 POWDER, FOR SOLUTION ORAL at 20:37

## 2023-03-11 ASSESSMENT — PAIN SCALES - GENERAL: PAINLEVEL_OUTOF10: 0

## 2023-03-11 NOTE — FLOWSHEET NOTE
03/11/23 0819   Vitals   Temp 98 °F (36.7 °C)   Temp Source Temporal   Heart Rate 64   Heart Rate Source Monitor   Resp 16   BP (!) 145/86   MAP (Calculated) 106   MAP (mmHg) 105   BP Location Left upper arm   BP Upper/Lower Upper   BP Method Automatic   Patient Position Semi fowlers   Level of Consciousness 1   MEWS Score 2   Cardiac Rhythm Sinus rhythm;1° AV Block   Pain Assessment   Pain Assessment None - Denies Pain   Oxygen Therapy   SpO2 95 %   Pulse Oximetry Type Continuous   Pulse Oximeter Device Mode Continuous   Pulse Oximeter Device Location Finger   O2 Device Nasal cannula   O2 Flow Rate (L/min) 2 L/min     Shift assessment complete. VSS. Pt. Is alert and oriented resting comfortably in bed. Pt. Denies chest pain. Pt noted with dry cough, pt states \"I have had this cough for years\". On 2L nasal canula at this time. POC discussed with patient and patient states understanding and is in agreement with plan. Bed alarm engaged. Call light and bedside table within reach. No further needs expressed at this time.  Viviane Foreman RN

## 2023-03-12 LAB
ANION GAP SERPL CALCULATED.3IONS-SCNC: 6 MMOL/L (ref 3–16)
BUN BLDV-MCNC: 38 MG/DL (ref 7–20)
CALCIUM SERPL-MCNC: 8.7 MG/DL (ref 8.3–10.6)
CHLORIDE BLD-SCNC: 95 MMOL/L (ref 99–110)
CO2: 29 MMOL/L (ref 21–32)
CREAT SERPL-MCNC: 1.5 MG/DL (ref 0.6–1.2)
GFR SERPL CREATININE-BSD FRML MDRD: 33 ML/MIN/{1.73_M2}
GLUCOSE BLD-MCNC: 111 MG/DL (ref 70–99)
GLUCOSE BLD-MCNC: 56 MG/DL (ref 70–99)
GLUCOSE BLD-MCNC: 64 MG/DL (ref 70–99)
GLUCOSE BLD-MCNC: 66 MG/DL (ref 70–99)
GLUCOSE BLD-MCNC: 72 MG/DL (ref 70–99)
GLUCOSE BLD-MCNC: 73 MG/DL (ref 70–99)
GLUCOSE BLD-MCNC: 98 MG/DL (ref 70–99)
PERFORMED ON: ABNORMAL
PERFORMED ON: NORMAL
POTASSIUM SERPL-SCNC: 4.3 MMOL/L (ref 3.5–5.1)
SODIUM BLD-SCNC: 130 MMOL/L (ref 136–145)

## 2023-03-12 PROCEDURE — 2580000003 HC RX 258: Performed by: CLINICAL NURSE SPECIALIST

## 2023-03-12 PROCEDURE — 6370000000 HC RX 637 (ALT 250 FOR IP): Performed by: INTERNAL MEDICINE

## 2023-03-12 PROCEDURE — 94760 N-INVAS EAR/PLS OXIMETRY 1: CPT

## 2023-03-12 PROCEDURE — 94640 AIRWAY INHALATION TREATMENT: CPT

## 2023-03-12 PROCEDURE — 2000000000 HC ICU R&B

## 2023-03-12 PROCEDURE — 94761 N-INVAS EAR/PLS OXIMETRY MLT: CPT

## 2023-03-12 PROCEDURE — 99233 SBSQ HOSP IP/OBS HIGH 50: CPT | Performed by: NURSE PRACTITIONER

## 2023-03-12 PROCEDURE — 2580000003 HC RX 258: Performed by: FAMILY MEDICINE

## 2023-03-12 PROCEDURE — 6370000000 HC RX 637 (ALT 250 FOR IP): Performed by: NURSE PRACTITIONER

## 2023-03-12 PROCEDURE — 6360000002 HC RX W HCPCS: Performed by: CLINICAL NURSE SPECIALIST

## 2023-03-12 PROCEDURE — 80048 BASIC METABOLIC PNL TOTAL CA: CPT

## 2023-03-12 PROCEDURE — 6370000000 HC RX 637 (ALT 250 FOR IP): Performed by: FAMILY MEDICINE

## 2023-03-12 RX ORDER — FUROSEMIDE 40 MG/1
40 TABLET ORAL 2 TIMES DAILY
Status: DISCONTINUED | OUTPATIENT
Start: 2023-03-12 | End: 2023-03-14

## 2023-03-12 RX ADMIN — TRAZODONE HYDROCHLORIDE 50 MG: 50 TABLET ORAL at 20:29

## 2023-03-12 RX ADMIN — LEVOTHYROXINE SODIUM 100 MCG: 100 TABLET ORAL at 06:57

## 2023-03-12 RX ADMIN — HYDRALAZINE HYDROCHLORIDE 50 MG: 25 TABLET, FILM COATED ORAL at 20:32

## 2023-03-12 RX ADMIN — NIFEDIPINE 30 MG: 30 TABLET, EXTENDED RELEASE ORAL at 08:40

## 2023-03-12 RX ADMIN — ALOGLIPTIN 6.25 MG: 6.25 TABLET, FILM COATED ORAL at 08:40

## 2023-03-12 RX ADMIN — METOPROLOL SUCCINATE 50 MG: 50 TABLET, EXTENDED RELEASE ORAL at 08:40

## 2023-03-12 RX ADMIN — GUAIFENESIN 600 MG: 600 TABLET, EXTENDED RELEASE ORAL at 20:29

## 2023-03-12 RX ADMIN — AMIODARONE HYDROCHLORIDE 200 MG: 200 TABLET ORAL at 08:40

## 2023-03-12 RX ADMIN — AZELASTINE HYDROCHLORIDE 1 DROP: 0.5 SOLUTION/ DROPS INTRAOCULAR at 20:33

## 2023-03-12 RX ADMIN — AZELASTINE HYDROCHLORIDE 1 DROP: 0.5 SOLUTION/ DROPS INTRAOCULAR at 08:40

## 2023-03-12 RX ADMIN — FUROSEMIDE 40 MG: 40 TABLET ORAL at 16:57

## 2023-03-12 RX ADMIN — IRON SUCROSE 200 MG: 20 INJECTION, SOLUTION INTRAVENOUS at 11:49

## 2023-03-12 RX ADMIN — Medication 10 ML: at 08:40

## 2023-03-12 RX ADMIN — Medication 10 ML: at 20:29

## 2023-03-12 RX ADMIN — INSULIN GLARGINE 20 UNITS: 100 INJECTION, SOLUTION SUBCUTANEOUS at 08:43

## 2023-03-12 RX ADMIN — LATANOPROST 1 DROP: 50 SOLUTION OPHTHALMIC at 20:33

## 2023-03-12 RX ADMIN — HYDRALAZINE HYDROCHLORIDE 50 MG: 25 TABLET, FILM COATED ORAL at 14:04

## 2023-03-12 RX ADMIN — NIFEDIPINE 30 MG: 30 TABLET, EXTENDED RELEASE ORAL at 20:29

## 2023-03-12 RX ADMIN — ATORVASTATIN CALCIUM 40 MG: 40 TABLET, FILM COATED ORAL at 20:28

## 2023-03-12 RX ADMIN — HYDRALAZINE HYDROCHLORIDE 50 MG: 25 TABLET, FILM COATED ORAL at 06:57

## 2023-03-12 RX ADMIN — APIXABAN 2.5 MG: 2.5 TABLET, FILM COATED ORAL at 08:40

## 2023-03-12 RX ADMIN — IPRATROPIUM BROMIDE AND ALBUTEROL SULFATE 1 AMPULE: .5; 3 SOLUTION RESPIRATORY (INHALATION) at 21:38

## 2023-03-12 RX ADMIN — GUAIFENESIN 600 MG: 600 TABLET, EXTENDED RELEASE ORAL at 08:40

## 2023-03-12 RX ADMIN — ASPIRIN 81 MG: 81 TABLET, COATED ORAL at 08:40

## 2023-03-12 RX ADMIN — APIXABAN 2.5 MG: 2.5 TABLET, FILM COATED ORAL at 20:29

## 2023-03-12 RX ADMIN — IPRATROPIUM BROMIDE AND ALBUTEROL SULFATE 1 AMPULE: .5; 3 SOLUTION RESPIRATORY (INHALATION) at 10:13

## 2023-03-12 ASSESSMENT — PAIN SCALES - GENERAL
PAINLEVEL_OUTOF10: 0

## 2023-03-12 NOTE — FLOWSHEET NOTE
Head to toe assessment complete. Per cardiology, will restart p.o. lasix tonight. Urine output decreased overnight - hospitalist made aware. Pt denies pain. All needs met at this time. Pt refused bath, but allowed wipes and purewick replaced.  Call light in reach.        03/12/23 0818   Vitals   Temp 97.3 °F (36.3 °C)   Temp Source Temporal   Heart Rate 59   Heart Rate Source Monitor   Resp 18   BP (!) 160/55   MAP (Calculated) 90   MAP (mmHg) 85   BP Location Left upper arm   BP Upper/Lower Upper   BP Method Automatic   Patient Position Semi fowlers   Level of Consciousness 0   MEWS Score 1   Cardiac Rhythm Sinus rhythm   Pain Assessment   Pain Assessment None - Denies Pain   Pain Level 0   Patient's Stated Pain Goal 0 - No pain   Oxygen Therapy   SpO2 93 %   Pulse Oximetry Type Intermittent   O2 Device Nasal cannula   O2 Flow Rate (L/min) 0.5 L/min

## 2023-03-13 PROBLEM — E87.1 HYPONATREMIA: Status: ACTIVE | Noted: 2023-03-13

## 2023-03-13 LAB
ANION GAP SERPL CALCULATED.3IONS-SCNC: 8 MMOL/L (ref 3–16)
BUN BLDV-MCNC: 40 MG/DL (ref 7–20)
CALCIUM SERPL-MCNC: 8.8 MG/DL (ref 8.3–10.6)
CHLORIDE BLD-SCNC: 92 MMOL/L (ref 99–110)
CO2: 28 MMOL/L (ref 21–32)
CREAT SERPL-MCNC: 1.6 MG/DL (ref 0.6–1.2)
GFR SERPL CREATININE-BSD FRML MDRD: 31 ML/MIN/{1.73_M2}
GLUCOSE BLD-MCNC: 124 MG/DL (ref 70–99)
GLUCOSE BLD-MCNC: 149 MG/DL (ref 70–99)
GLUCOSE BLD-MCNC: 55 MG/DL (ref 70–99)
GLUCOSE BLD-MCNC: 55 MG/DL (ref 70–99)
GLUCOSE BLD-MCNC: 68 MG/DL (ref 70–99)
GLUCOSE BLD-MCNC: 85 MG/DL (ref 70–99)
GLUCOSE BLD-MCNC: 94 MG/DL (ref 70–99)
GLUCOSE BLD-MCNC: 95 MG/DL (ref 70–99)
PERFORMED ON: ABNORMAL
PERFORMED ON: NORMAL
POTASSIUM SERPL-SCNC: 4.7 MMOL/L (ref 3.5–5.1)
PRO-BNP: 7655 PG/ML (ref 0–449)
SODIUM BLD-SCNC: 128 MMOL/L (ref 136–145)

## 2023-03-13 PROCEDURE — 2580000003 HC RX 258: Performed by: FAMILY MEDICINE

## 2023-03-13 PROCEDURE — 36415 COLL VENOUS BLD VENIPUNCTURE: CPT

## 2023-03-13 PROCEDURE — 99233 SBSQ HOSP IP/OBS HIGH 50: CPT | Performed by: CLINICAL NURSE SPECIALIST

## 2023-03-13 PROCEDURE — 92526 ORAL FUNCTION THERAPY: CPT

## 2023-03-13 PROCEDURE — 6360000002 HC RX W HCPCS: Performed by: CLINICAL NURSE SPECIALIST

## 2023-03-13 PROCEDURE — 83880 ASSAY OF NATRIURETIC PEPTIDE: CPT

## 2023-03-13 PROCEDURE — 2580000003 HC RX 258: Performed by: CLINICAL NURSE SPECIALIST

## 2023-03-13 PROCEDURE — 6370000000 HC RX 637 (ALT 250 FOR IP): Performed by: FAMILY MEDICINE

## 2023-03-13 PROCEDURE — 6370000000 HC RX 637 (ALT 250 FOR IP): Performed by: INTERNAL MEDICINE

## 2023-03-13 PROCEDURE — 80048 BASIC METABOLIC PNL TOTAL CA: CPT

## 2023-03-13 PROCEDURE — 94640 AIRWAY INHALATION TREATMENT: CPT

## 2023-03-13 PROCEDURE — 6370000000 HC RX 637 (ALT 250 FOR IP): Performed by: CLINICAL NURSE SPECIALIST

## 2023-03-13 PROCEDURE — 6370000000 HC RX 637 (ALT 250 FOR IP): Performed by: NURSE PRACTITIONER

## 2023-03-13 PROCEDURE — 94761 N-INVAS EAR/PLS OXIMETRY MLT: CPT

## 2023-03-13 PROCEDURE — 97530 THERAPEUTIC ACTIVITIES: CPT

## 2023-03-13 PROCEDURE — 2700000000 HC OXYGEN THERAPY PER DAY

## 2023-03-13 PROCEDURE — 2000000000 HC ICU R&B

## 2023-03-13 PROCEDURE — 97116 GAIT TRAINING THERAPY: CPT

## 2023-03-13 RX ORDER — TOLVAPTAN 15 MG/1
15 TABLET ORAL ONCE
Status: COMPLETED | OUTPATIENT
Start: 2023-03-13 | End: 2023-03-13

## 2023-03-13 RX ORDER — HYDRALAZINE HYDROCHLORIDE 25 MG/1
75 TABLET, FILM COATED ORAL EVERY 8 HOURS SCHEDULED
Status: DISCONTINUED | OUTPATIENT
Start: 2023-03-13 | End: 2023-03-18

## 2023-03-13 RX ORDER — METOPROLOL SUCCINATE 25 MG/1
25 TABLET, EXTENDED RELEASE ORAL DAILY
Status: DISCONTINUED | OUTPATIENT
Start: 2023-03-14 | End: 2023-03-18

## 2023-03-13 RX ORDER — INSULIN GLARGINE 100 [IU]/ML
5 INJECTION, SOLUTION SUBCUTANEOUS
Status: DISCONTINUED | OUTPATIENT
Start: 2023-03-13 | End: 2023-03-21 | Stop reason: HOSPADM

## 2023-03-13 RX ADMIN — IPRATROPIUM BROMIDE AND ALBUTEROL SULFATE 1 AMPULE: .5; 3 SOLUTION RESPIRATORY (INHALATION) at 08:05

## 2023-03-13 RX ADMIN — NIFEDIPINE 30 MG: 30 TABLET, EXTENDED RELEASE ORAL at 10:18

## 2023-03-13 RX ADMIN — INSULIN GLARGINE 5 UNITS: 100 INJECTION, SOLUTION SUBCUTANEOUS at 10:25

## 2023-03-13 RX ADMIN — TRAZODONE HYDROCHLORIDE 50 MG: 50 TABLET ORAL at 21:09

## 2023-03-13 RX ADMIN — Medication 10 ML: at 21:09

## 2023-03-13 RX ADMIN — AZELASTINE HYDROCHLORIDE 1 DROP: 0.5 SOLUTION/ DROPS INTRAOCULAR at 21:10

## 2023-03-13 RX ADMIN — IRON SUCROSE 200 MG: 20 INJECTION, SOLUTION INTRAVENOUS at 13:20

## 2023-03-13 RX ADMIN — HYDRALAZINE HYDROCHLORIDE 75 MG: 25 TABLET, FILM COATED ORAL at 21:08

## 2023-03-13 RX ADMIN — GUAIFENESIN 600 MG: 600 TABLET, EXTENDED RELEASE ORAL at 10:18

## 2023-03-13 RX ADMIN — ALOGLIPTIN 6.25 MG: 6.25 TABLET, FILM COATED ORAL at 10:30

## 2023-03-13 RX ADMIN — HYDRALAZINE HYDROCHLORIDE 50 MG: 25 TABLET, FILM COATED ORAL at 05:02

## 2023-03-13 RX ADMIN — NIFEDIPINE 30 MG: 30 TABLET, EXTENDED RELEASE ORAL at 21:09

## 2023-03-13 RX ADMIN — ASPIRIN 81 MG: 81 TABLET, COATED ORAL at 10:17

## 2023-03-13 RX ADMIN — ATORVASTATIN CALCIUM 40 MG: 40 TABLET, FILM COATED ORAL at 21:09

## 2023-03-13 RX ADMIN — AZELASTINE HYDROCHLORIDE 1 DROP: 0.5 SOLUTION/ DROPS INTRAOCULAR at 10:21

## 2023-03-13 RX ADMIN — AMIODARONE HYDROCHLORIDE 200 MG: 200 TABLET ORAL at 10:17

## 2023-03-13 RX ADMIN — LATANOPROST 1 DROP: 50 SOLUTION OPHTHALMIC at 21:10

## 2023-03-13 RX ADMIN — FUROSEMIDE 40 MG: 40 TABLET ORAL at 10:18

## 2023-03-13 RX ADMIN — TOLVAPTAN 15 MG: 15 TABLET ORAL at 10:20

## 2023-03-13 RX ADMIN — APIXABAN 2.5 MG: 2.5 TABLET, FILM COATED ORAL at 10:18

## 2023-03-13 RX ADMIN — LEVOTHYROXINE SODIUM 100 MCG: 100 TABLET ORAL at 05:02

## 2023-03-13 RX ADMIN — IPRATROPIUM BROMIDE AND ALBUTEROL SULFATE 1 AMPULE: .5; 3 SOLUTION RESPIRATORY (INHALATION) at 20:20

## 2023-03-13 RX ADMIN — APIXABAN 2.5 MG: 2.5 TABLET, FILM COATED ORAL at 21:08

## 2023-03-13 RX ADMIN — POLYETHYLENE GLYCOL 3350 17 G: 17 POWDER, FOR SOLUTION ORAL at 21:16

## 2023-03-13 RX ADMIN — GUAIFENESIN 600 MG: 600 TABLET, EXTENDED RELEASE ORAL at 21:09

## 2023-03-13 RX ADMIN — Medication 10 ML: at 10:19

## 2023-03-13 RX ADMIN — HYDRALAZINE HYDROCHLORIDE 75 MG: 25 TABLET, FILM COATED ORAL at 14:34

## 2023-03-13 RX ADMIN — FUROSEMIDE 40 MG: 40 TABLET ORAL at 17:25

## 2023-03-13 ASSESSMENT — PAIN SCALES - GENERAL
PAINLEVEL_OUTOF10: 0

## 2023-03-13 ASSESSMENT — ENCOUNTER SYMPTOMS: SHORTNESS OF BREATH: 1

## 2023-03-14 LAB
ANION GAP SERPL CALCULATED.3IONS-SCNC: 10 MMOL/L (ref 3–16)
ANISOCYTOSIS: ABNORMAL
BASOPHILS ABSOLUTE: 0 K/UL (ref 0–0.2)
BASOPHILS RELATIVE PERCENT: 0 %
BUN BLDV-MCNC: 41 MG/DL (ref 7–20)
CALCIUM SERPL-MCNC: 8.7 MG/DL (ref 8.3–10.6)
CHLORIDE BLD-SCNC: 93 MMOL/L (ref 99–110)
CO2: 28 MMOL/L (ref 21–32)
CREAT SERPL-MCNC: 1.7 MG/DL (ref 0.6–1.2)
EOSINOPHILS ABSOLUTE: 0.3 K/UL (ref 0–0.6)
EOSINOPHILS RELATIVE PERCENT: 3 %
GFR SERPL CREATININE-BSD FRML MDRD: 28 ML/MIN/{1.73_M2}
GLUCOSE BLD-MCNC: 126 MG/DL (ref 70–99)
GLUCOSE BLD-MCNC: 126 MG/DL (ref 70–99)
GLUCOSE BLD-MCNC: 132 MG/DL (ref 70–99)
GLUCOSE BLD-MCNC: 138 MG/DL (ref 70–99)
GLUCOSE BLD-MCNC: 153 MG/DL (ref 70–99)
GLUCOSE BLD-MCNC: 201 MG/DL (ref 70–99)
GLUCOSE BLD-MCNC: 208 MG/DL (ref 70–99)
HCT VFR BLD CALC: 27.9 % (ref 36–48)
HEMOGLOBIN: 9 G/DL (ref 12–16)
LYMPHOCYTES ABSOLUTE: 0.6 K/UL (ref 1–5.1)
LYMPHOCYTES RELATIVE PERCENT: 7 %
MCH RBC QN AUTO: 25.2 PG (ref 26–34)
MCHC RBC AUTO-ENTMCNC: 32.2 G/DL (ref 31–36)
MCV RBC AUTO: 78.2 FL (ref 80–100)
MONOCYTES ABSOLUTE: 1 K/UL (ref 0–1.3)
MONOCYTES RELATIVE PERCENT: 12 %
NEUTROPHILS ABSOLUTE: 6.8 K/UL (ref 1.7–7.7)
NEUTROPHILS RELATIVE PERCENT: 78 %
PDW BLD-RTO: 22.8 % (ref 12.4–15.4)
PERFORMED ON: ABNORMAL
PLATELET # BLD: 317 K/UL (ref 135–450)
PLATELET SLIDE REVIEW: ADEQUATE
PMV BLD AUTO: 8 FL (ref 5–10.5)
POLYCHROMASIA: ABNORMAL
POTASSIUM SERPL-SCNC: 3.7 MMOL/L (ref 3.5–5.1)
RBC # BLD: 3.57 M/UL (ref 4–5.2)
SLIDE REVIEW: ABNORMAL
SODIUM BLD-SCNC: 131 MMOL/L (ref 136–145)
SPHEROCYTES: ABNORMAL
WBC # BLD: 8.7 K/UL (ref 4–11)

## 2023-03-14 PROCEDURE — 6370000000 HC RX 637 (ALT 250 FOR IP): Performed by: INTERNAL MEDICINE

## 2023-03-14 PROCEDURE — 97116 GAIT TRAINING THERAPY: CPT

## 2023-03-14 PROCEDURE — 2700000000 HC OXYGEN THERAPY PER DAY

## 2023-03-14 PROCEDURE — 97530 THERAPEUTIC ACTIVITIES: CPT

## 2023-03-14 PROCEDURE — 94640 AIRWAY INHALATION TREATMENT: CPT

## 2023-03-14 PROCEDURE — 97535 SELF CARE MNGMENT TRAINING: CPT

## 2023-03-14 PROCEDURE — 80048 BASIC METABOLIC PNL TOTAL CA: CPT

## 2023-03-14 PROCEDURE — 92526 ORAL FUNCTION THERAPY: CPT

## 2023-03-14 PROCEDURE — 6370000000 HC RX 637 (ALT 250 FOR IP): Performed by: NURSE PRACTITIONER

## 2023-03-14 PROCEDURE — 6370000000 HC RX 637 (ALT 250 FOR IP): Performed by: FAMILY MEDICINE

## 2023-03-14 PROCEDURE — 85025 COMPLETE CBC W/AUTO DIFF WBC: CPT

## 2023-03-14 PROCEDURE — 6370000000 HC RX 637 (ALT 250 FOR IP): Performed by: CLINICAL NURSE SPECIALIST

## 2023-03-14 PROCEDURE — 99233 SBSQ HOSP IP/OBS HIGH 50: CPT | Performed by: CLINICAL NURSE SPECIALIST

## 2023-03-14 PROCEDURE — 2580000003 HC RX 258: Performed by: FAMILY MEDICINE

## 2023-03-14 PROCEDURE — 94761 N-INVAS EAR/PLS OXIMETRY MLT: CPT

## 2023-03-14 PROCEDURE — 2000000000 HC ICU R&B

## 2023-03-14 RX ORDER — TOLVAPTAN 15 MG/1
15 TABLET ORAL ONCE
Status: COMPLETED | OUTPATIENT
Start: 2023-03-14 | End: 2023-03-14

## 2023-03-14 RX ADMIN — HYDRALAZINE HYDROCHLORIDE 75 MG: 25 TABLET, FILM COATED ORAL at 20:33

## 2023-03-14 RX ADMIN — IPRATROPIUM BROMIDE AND ALBUTEROL SULFATE 1 AMPULE: .5; 3 SOLUTION RESPIRATORY (INHALATION) at 08:39

## 2023-03-14 RX ADMIN — AMIODARONE HYDROCHLORIDE 200 MG: 200 TABLET ORAL at 08:07

## 2023-03-14 RX ADMIN — Medication 10 ML: at 20:36

## 2023-03-14 RX ADMIN — Medication 10 ML: at 08:10

## 2023-03-14 RX ADMIN — NIFEDIPINE 30 MG: 30 TABLET, EXTENDED RELEASE ORAL at 20:33

## 2023-03-14 RX ADMIN — TOLVAPTAN 15 MG: 15 TABLET ORAL at 09:51

## 2023-03-14 RX ADMIN — HYDRALAZINE HYDROCHLORIDE 75 MG: 25 TABLET, FILM COATED ORAL at 05:26

## 2023-03-14 RX ADMIN — GUAIFENESIN 600 MG: 600 TABLET, EXTENDED RELEASE ORAL at 20:33

## 2023-03-14 RX ADMIN — NIFEDIPINE 30 MG: 30 TABLET, EXTENDED RELEASE ORAL at 08:07

## 2023-03-14 RX ADMIN — ATORVASTATIN CALCIUM 40 MG: 40 TABLET, FILM COATED ORAL at 20:33

## 2023-03-14 RX ADMIN — APIXABAN 2.5 MG: 2.5 TABLET, FILM COATED ORAL at 20:33

## 2023-03-14 RX ADMIN — ASPIRIN 81 MG: 81 TABLET, COATED ORAL at 08:07

## 2023-03-14 RX ADMIN — AZELASTINE HYDROCHLORIDE 1 DROP: 0.5 SOLUTION/ DROPS INTRAOCULAR at 20:36

## 2023-03-14 RX ADMIN — APIXABAN 2.5 MG: 2.5 TABLET, FILM COATED ORAL at 08:06

## 2023-03-14 RX ADMIN — TRAZODONE HYDROCHLORIDE 50 MG: 50 TABLET ORAL at 20:33

## 2023-03-14 RX ADMIN — METOPROLOL SUCCINATE 25 MG: 25 TABLET, EXTENDED RELEASE ORAL at 08:06

## 2023-03-14 RX ADMIN — HYDRALAZINE HYDROCHLORIDE 75 MG: 25 TABLET, FILM COATED ORAL at 14:33

## 2023-03-14 RX ADMIN — LEVOTHYROXINE SODIUM 100 MCG: 100 TABLET ORAL at 05:26

## 2023-03-14 RX ADMIN — LATANOPROST 1 DROP: 50 SOLUTION OPHTHALMIC at 20:36

## 2023-03-14 RX ADMIN — GUAIFENESIN 600 MG: 600 TABLET, EXTENDED RELEASE ORAL at 08:07

## 2023-03-14 RX ADMIN — FUROSEMIDE 40 MG: 40 TABLET ORAL at 08:07

## 2023-03-14 RX ADMIN — INSULIN LISPRO 4 UNITS: 100 INJECTION, SOLUTION INTRAVENOUS; SUBCUTANEOUS at 12:31

## 2023-03-14 RX ADMIN — ALOGLIPTIN 6.25 MG: 6.25 TABLET, FILM COATED ORAL at 08:07

## 2023-03-14 RX ADMIN — AZELASTINE HYDROCHLORIDE 1 DROP: 0.5 SOLUTION/ DROPS INTRAOCULAR at 08:16

## 2023-03-14 ASSESSMENT — PAIN SCALES - GENERAL
PAINLEVEL_OUTOF10: 0

## 2023-03-14 NOTE — RT PROTOCOL NOTE
RT Inhaler-Nebulizer Bronchodilator Protocol Note    There is a bronchodilator order in the chart from a provider indicating to follow the RT Bronchodilator Protocol and there is an Initiate RT Inhaler-Nebulizer Bronchodilator Protocol order as well (see protocol at bottom of note). CXR Findings:  No results found. The findings from the last RT Protocol Assessment were as follows:   History Pulmonary Disease: None or smoker <15 pack years  Respiratory Pattern: Mild dyspnea at rest, irregular pattern, or RR 21-25 bpm  Breath Sounds: Slightly diminished and/or crackles  Cough: Strong, spontaneous, non-productive  Indication for Bronchodilator Therapy: On home bronchodilators  Bronchodilator Assessment Score: 6    Aerosolized bronchodilator medication orders have been revised according to the RT Inhaler-Nebulizer Bronchodilator Protocol below. Respiratory Therapist to perform RT Therapy Protocol Assessment initially then follow the protocol. Repeat RT Therapy Protocol Assessment PRN for score 0-3 or on second treatment, BID, and PRN for scores above 3. No Indications - adjust the frequency to every 6 hours PRN wheezing or bronchospasm, if no treatments needed after 48 hours then discontinue using Per Protocol order mode. If indication present, adjust the RT bronchodilator orders based on the Bronchodilator Assessment Score as indicated below. Use Inhaler orders unless patient has one or more of the following: on home nebulizer, not able to hold breath for 10 seconds, is not alert and oriented, cannot activate and use MDI correctly, or respiratory rate 25 breaths per minute or more, then use the equivalent nebulizer order(s) with same Frequency and PRN reasons based on the score. If a patient is on this medication at home then do not decrease Frequency below that used at home.     0-3 - enter or revise RT bronchodilator order(s) to equivalent RT Bronchodilator order with Frequency of every 4 hours
every 4 hours PRN for wheezing or increased work of breathing using Per Protocol order mode. 4-6 - enter or revise RT Bronchodilator order(s) to two equivalent RT bronchodilator orders with one order with BID Frequency and one order with Frequency of every 4 hours PRN wheezing or increased work of breathing using Per Protocol order mode. 7-10 - enter or revise RT Bronchodilator order(s) to two equivalent RT bronchodilator orders with one order with TID Frequency and one order with Frequency of every 4 hours PRN wheezing or increased work of breathing using Per Protocol order mode. 11-13 - enter or revise RT Bronchodilator order(s) to one equivalent RT bronchodilator order with QID Frequency and an Albuterol order with Frequency of every 4 hours PRN wheezing or increased work of breathing using Per Protocol order mode. Greater than 13 - enter or revise RT Bronchodilator order(s) to one equivalent RT bronchodilator order with every 4 hours Frequency and an Albuterol order with Frequency of every 2 hours PRN wheezing or increased work of breathing using Per Protocol order mode. RT to enter RT Home Evaluation for COPD & MDI Assessment order using Per Protocol order mode.     Electronically signed by Susi Handy RCP on 3/6/2023 at 11:14 AM
increased work of breathing using Per Protocol order mode. 4-6 - enter or revise RT Bronchodilator order(s) to two equivalent RT bronchodilator orders with one order with BID Frequency and one order with Frequency of every 4 hours PRN wheezing or increased work of breathing using Per Protocol order mode. 7-10 - enter or revise RT Bronchodilator order(s) to two equivalent RT bronchodilator orders with one order with TID Frequency and one order with Frequency of every 4 hours PRN wheezing or increased work of breathing using Per Protocol order mode. 11-13 - enter or revise RT Bronchodilator order(s) to one equivalent RT bronchodilator order with QID Frequency and an Albuterol order with Frequency of every 4 hours PRN wheezing or increased work of breathing using Per Protocol order mode. Greater than 13 - enter or revise RT Bronchodilator order(s) to one equivalent RT bronchodilator order with every 4 hours Frequency and an Albuterol order with Frequency of every 2 hours PRN wheezing or increased work of breathing using Per Protocol order mode. RT to enter RT Home Evaluation for COPD & MDI Assessment order using Per Protocol order mode.     Electronically signed by Issa Vázquez RCP on 3/14/2023 at 8:46 AM
discontinue using Per Protocol order mode. If indication present, adjust the RT bronchodilator orders based on the Bronchodilator Assessment Score as indicated below. If a patient is on this medication at home then do not decrease Frequency below that used at home. 0-3 - enter or revise RT bronchodilator order(s) to equivalent RT Bronchodilator order with Frequency of every 4 hours PRN for wheezing or increased work of breathing using Per Protocol order mode. 4-6 - enter or revise RT Bronchodilator order(s) to two equivalent RT bronchodilator orders with one order with BID Frequency and one order with Frequency of every 4 hours PRN wheezing or increased work of breathing using Per Protocol order mode. 7-10 - enter or revise RT Bronchodilator order(s) to two equivalent RT bronchodilator orders with one order with TID Frequency and one order with Frequency of every 4 hours PRN wheezing or increased work of breathing using Per Protocol order mode. 11-13 - enter or revise RT Bronchodilator order(s) to one equivalent RT bronchodilator order with QID Frequency and an Albuterol order with Frequency of every 4 hours PRN wheezing or increased work of breathing using Per Protocol order mode. Greater than 13 - enter or revise RT Bronchodilator order(s) to one equivalent RT bronchodilator order with every 4 hours Frequency and an Albuterol order with Frequency of every 2 hours PRN wheezing or increased work of breathing using Per Protocol order mode. RT to enter RT Home Evaluation for COPD & MDI Assessment order using Per Protocol order mode.     Electronically signed by Toño Tavares RCP on 3/11/2023 at 4:39 PM

## 2023-03-14 NOTE — CARE COORDINATION
Problem: Patient Care Overview  Goal: Plan of Care Review  Flowsheets  Taken 3/18/2020 0421  Progress: improving  Outcome Summary: VSS. Patient wore BIPAP mask during the night. Blood glucose still unstable; patient keeps eating snacks brought in from friends despite education about this. Resting in bed. No c/o pain/discomfort. Will continue to monitor.  Taken 3/17/2020 2000  Plan of Care Reviewed With: patient      CM met with pt and son at bedside who wanted to discussed skilled placement. CM discussed therapy scores and that pt scored for hc services and that insurance would deny a skilled stay. CM discussed private pay options at a snf, private caregivers and cost and provided [employer] and provided pamphlet. Son states they do have a girl that they private pay that comes in mon/wed/ fri 11-4:30 and he will talk to her. Cm suggested family staying with pt for a few days also or another trusted friend. CM told pt and son will make [facility] referral but their availability can vary with staffing. [facility] referral made on line for any services that pt may qualify for.        Kya Woodruff, RN, BSN  [phone]

## 2023-03-15 LAB
ANION GAP SERPL CALCULATED.3IONS-SCNC: 12 MMOL/L (ref 3–16)
BUN SERPL-MCNC: 43 MG/DL (ref 7–20)
CALCIUM SERPL-MCNC: 8.8 MG/DL (ref 8.3–10.6)
CHLORIDE SERPL-SCNC: 92 MMOL/L (ref 99–110)
CO2 SERPL-SCNC: 25 MMOL/L (ref 21–32)
CREAT SERPL-MCNC: 1.9 MG/DL (ref 0.6–1.2)
GFR SERPLBLD CREATININE-BSD FMLA CKD-EPI: 25 ML/MIN/{1.73_M2}
GLUCOSE BLD-MCNC: 141 MG/DL (ref 70–99)
GLUCOSE BLD-MCNC: 144 MG/DL (ref 70–99)
GLUCOSE SERPL-MCNC: 214 MG/DL (ref 70–99)
NT-PROBNP SERPL-MCNC: 6643 PG/ML (ref 0–449)
PERFORMED ON: ABNORMAL
PERFORMED ON: ABNORMAL
POTASSIUM SERPL-SCNC: 4.2 MMOL/L (ref 3.5–5.1)
SODIUM SERPL-SCNC: 129 MMOL/L (ref 136–145)

## 2023-03-15 PROCEDURE — 6370000000 HC RX 637 (ALT 250 FOR IP): Performed by: INTERNAL MEDICINE

## 2023-03-15 PROCEDURE — 83880 ASSAY OF NATRIURETIC PEPTIDE: CPT

## 2023-03-15 PROCEDURE — 97535 SELF CARE MNGMENT TRAINING: CPT

## 2023-03-15 PROCEDURE — 97530 THERAPEUTIC ACTIVITIES: CPT

## 2023-03-15 PROCEDURE — 6370000000 HC RX 637 (ALT 250 FOR IP): Performed by: CLINICAL NURSE SPECIALIST

## 2023-03-15 PROCEDURE — 80048 BASIC METABOLIC PNL TOTAL CA: CPT

## 2023-03-15 PROCEDURE — 6370000000 HC RX 637 (ALT 250 FOR IP): Performed by: FAMILY MEDICINE

## 2023-03-15 PROCEDURE — 2000000000 HC ICU R&B

## 2023-03-15 PROCEDURE — 97116 GAIT TRAINING THERAPY: CPT

## 2023-03-15 PROCEDURE — 97110 THERAPEUTIC EXERCISES: CPT

## 2023-03-15 PROCEDURE — 2580000003 HC RX 258: Performed by: FAMILY MEDICINE

## 2023-03-15 RX ORDER — FERROUS SULFATE 325(65) MG
325 TABLET ORAL 2 TIMES DAILY WITH MEALS
Status: DISCONTINUED | OUTPATIENT
Start: 2023-03-15 | End: 2023-03-21 | Stop reason: HOSPADM

## 2023-03-15 RX ORDER — INSULIN LISPRO 100 [IU]/ML
0-4 INJECTION, SOLUTION INTRAVENOUS; SUBCUTANEOUS
Status: DISCONTINUED | OUTPATIENT
Start: 2023-03-15 | End: 2023-03-21 | Stop reason: HOSPADM

## 2023-03-15 RX ADMIN — INSULIN GLARGINE 5 UNITS: 100 INJECTION, SOLUTION SUBCUTANEOUS at 09:08

## 2023-03-15 RX ADMIN — AZELASTINE HYDROCHLORIDE 1 DROP: 0.5 SOLUTION/ DROPS INTRAOCULAR at 09:01

## 2023-03-15 RX ADMIN — HYDRALAZINE HYDROCHLORIDE 75 MG: 25 TABLET, FILM COATED ORAL at 14:59

## 2023-03-15 RX ADMIN — APIXABAN 2.5 MG: 2.5 TABLET, FILM COATED ORAL at 21:31

## 2023-03-15 RX ADMIN — INSULIN LISPRO 1 UNITS: 100 INJECTION, SOLUTION INTRAVENOUS; SUBCUTANEOUS at 14:59

## 2023-03-15 RX ADMIN — ASPIRIN 81 MG: 81 TABLET, COATED ORAL at 08:59

## 2023-03-15 RX ADMIN — Medication 10 ML: at 09:01

## 2023-03-15 RX ADMIN — NIFEDIPINE 30 MG: 30 TABLET, EXTENDED RELEASE ORAL at 08:57

## 2023-03-15 RX ADMIN — NIFEDIPINE 30 MG: 30 TABLET, EXTENDED RELEASE ORAL at 21:32

## 2023-03-15 RX ADMIN — LEVOTHYROXINE SODIUM 100 MCG: 100 TABLET ORAL at 10:48

## 2023-03-15 RX ADMIN — LATANOPROST 1 DROP: 50 SOLUTION OPHTHALMIC at 21:32

## 2023-03-15 RX ADMIN — APIXABAN 2.5 MG: 2.5 TABLET, FILM COATED ORAL at 08:59

## 2023-03-15 RX ADMIN — AMIODARONE HYDROCHLORIDE 200 MG: 200 TABLET ORAL at 08:59

## 2023-03-15 RX ADMIN — Medication 10 ML: at 21:32

## 2023-03-15 RX ADMIN — METOPROLOL SUCCINATE 25 MG: 25 TABLET, EXTENDED RELEASE ORAL at 08:59

## 2023-03-15 RX ADMIN — GUAIFENESIN 600 MG: 600 TABLET, EXTENDED RELEASE ORAL at 08:58

## 2023-03-15 RX ADMIN — GUAIFENESIN 600 MG: 600 TABLET, EXTENDED RELEASE ORAL at 21:31

## 2023-03-15 RX ADMIN — ATORVASTATIN CALCIUM 40 MG: 40 TABLET, FILM COATED ORAL at 21:31

## 2023-03-15 RX ADMIN — FERROUS SULFATE TAB 325 MG (65 MG ELEMENTAL FE) 325 MG: 325 (65 FE) TAB at 21:35

## 2023-03-15 RX ADMIN — HYDRALAZINE HYDROCHLORIDE 75 MG: 25 TABLET, FILM COATED ORAL at 09:09

## 2023-03-15 RX ADMIN — AZELASTINE HYDROCHLORIDE 1 DROP: 0.5 SOLUTION/ DROPS INTRAOCULAR at 21:32

## 2023-03-15 RX ADMIN — TRAZODONE HYDROCHLORIDE 50 MG: 50 TABLET ORAL at 21:31

## 2023-03-15 RX ADMIN — ALOGLIPTIN 6.25 MG: 6.25 TABLET, FILM COATED ORAL at 08:58

## 2023-03-15 RX ADMIN — HYDRALAZINE HYDROCHLORIDE 75 MG: 25 TABLET, FILM COATED ORAL at 21:32

## 2023-03-15 ASSESSMENT — PAIN SCALES - GENERAL
PAINLEVEL_OUTOF10: 0
PAINLEVEL_OUTOF10: 0

## 2023-03-15 NOTE — CARE COORDINATION
Therapy now recommends snf. CM provided Medicare approved list and ratings list and will follow up with pt. CM explained scores and process of insurance approval. She verbalized understanding.     Josesito Ybarra, RN, BSN  [phone]

## 2023-03-15 NOTE — CARE COORDINATION
Son and pt's top 3 choices are [facility] and [facility]. CM spoke to [place] at [facility] [phone] and faxed demographics to [phone]    CM left  for admissions at [address] [place] [address]    CM spoke to [place] at [facility] and faxed referral to [phone]      Additional referrals sent over [facility] to : [facility], [facility] and [facility].       Nicol Turner RN, BSN  [phone]

## 2023-03-16 ENCOUNTER — APPOINTMENT (OUTPATIENT)
Dept: GENERAL RADIOLOGY | Age: 88
DRG: 291 | End: 2023-03-16
Payer: MEDICARE

## 2023-03-16 LAB
ANION GAP SERPL CALCULATED.3IONS-SCNC: 10 MMOL/L (ref 3–16)
BACTERIA URNS QL MICRO: ABNORMAL /HPF
BILIRUB UR QL STRIP.AUTO: NEGATIVE
BUN SERPL-MCNC: 42 MG/DL (ref 7–20)
CALCIUM SERPL-MCNC: 8.7 MG/DL (ref 8.3–10.6)
CHLORIDE SERPL-SCNC: 97 MMOL/L (ref 99–110)
CLARITY UR: ABNORMAL
CO2 SERPL-SCNC: 28 MMOL/L (ref 21–32)
COLOR UR: YELLOW
CREAT SERPL-MCNC: 1.8 MG/DL (ref 0.6–1.2)
EPI CELLS #/AREA URNS AUTO: 3 /HPF (ref 0–5)
GFR SERPLBLD CREATININE-BSD FMLA CKD-EPI: 27 ML/MIN/{1.73_M2}
GLUCOSE BLD-MCNC: 152 MG/DL (ref 70–99)
GLUCOSE BLD-MCNC: 166 MG/DL (ref 70–99)
GLUCOSE SERPL-MCNC: 126 MG/DL (ref 70–99)
GLUCOSE UR STRIP.AUTO-MCNC: NEGATIVE MG/DL
HGB UR QL STRIP.AUTO: NEGATIVE
HYALINE CASTS #/AREA URNS AUTO: 1 /LPF (ref 0–8)
KETONES UR STRIP.AUTO-MCNC: NEGATIVE MG/DL
LEUKOCYTE ESTERASE UR QL STRIP.AUTO: ABNORMAL
MAGNESIUM SERPL-MCNC: 2.6 MG/DL (ref 1.8–2.4)
NITRITE UR QL STRIP.AUTO: NEGATIVE
PERFORMED ON: ABNORMAL
PERFORMED ON: ABNORMAL
PH UR STRIP.AUTO: 5.5 [PH] (ref 5–8)
POTASSIUM SERPL-SCNC: 3.3 MMOL/L (ref 3.5–5.1)
PROCALCITONIN SERPL IA-MCNC: 0.18 NG/ML (ref 0–0.15)
PROT UR STRIP.AUTO-MCNC: 100 MG/DL
RBC CLUMPS #/AREA URNS AUTO: 0 /HPF (ref 0–4)
SARS-COV-2 RDRP RESP QL NAA+PROBE: NOT DETECTED
SODIUM SERPL-SCNC: 135 MMOL/L (ref 136–145)
SP GR UR STRIP.AUTO: 1.01 (ref 1–1.03)
UA COMPLETE W REFLEX CULTURE PNL UR: YES
UA DIPSTICK W REFLEX MICRO PNL UR: YES
URN SPEC COLLECT METH UR: ABNORMAL
UROBILINOGEN UR STRIP-ACNC: 0.2 E.U./DL
WBC #/AREA URNS AUTO: 29 /HPF (ref 0–5)

## 2023-03-16 PROCEDURE — 97116 GAIT TRAINING THERAPY: CPT

## 2023-03-16 PROCEDURE — 81001 URINALYSIS AUTO W/SCOPE: CPT

## 2023-03-16 PROCEDURE — 6370000000 HC RX 637 (ALT 250 FOR IP): Performed by: CLINICAL NURSE SPECIALIST

## 2023-03-16 PROCEDURE — 6370000000 HC RX 637 (ALT 250 FOR IP): Performed by: FAMILY MEDICINE

## 2023-03-16 PROCEDURE — 2580000003 HC RX 258: Performed by: FAMILY MEDICINE

## 2023-03-16 PROCEDURE — 6370000000 HC RX 637 (ALT 250 FOR IP): Performed by: INTERNAL MEDICINE

## 2023-03-16 PROCEDURE — 36415 COLL VENOUS BLD VENIPUNCTURE: CPT

## 2023-03-16 PROCEDURE — 97530 THERAPEUTIC ACTIVITIES: CPT

## 2023-03-16 PROCEDURE — 80048 BASIC METABOLIC PNL TOTAL CA: CPT

## 2023-03-16 PROCEDURE — 83735 ASSAY OF MAGNESIUM: CPT

## 2023-03-16 PROCEDURE — 2000000000 HC ICU R&B

## 2023-03-16 PROCEDURE — 87086 URINE CULTURE/COLONY COUNT: CPT

## 2023-03-16 PROCEDURE — 71045 X-RAY EXAM CHEST 1 VIEW: CPT

## 2023-03-16 PROCEDURE — 87077 CULTURE AEROBIC IDENTIFY: CPT

## 2023-03-16 PROCEDURE — 84145 PROCALCITONIN (PCT): CPT

## 2023-03-16 PROCEDURE — 97110 THERAPEUTIC EXERCISES: CPT

## 2023-03-16 PROCEDURE — 97535 SELF CARE MNGMENT TRAINING: CPT

## 2023-03-16 PROCEDURE — 87635 SARS-COV-2 COVID-19 AMP PRB: CPT

## 2023-03-16 PROCEDURE — 87186 SC STD MICRODIL/AGAR DIL: CPT

## 2023-03-16 RX ORDER — TORSEMIDE 20 MG/1
20 TABLET ORAL DAILY
Status: DISCONTINUED | OUTPATIENT
Start: 2023-03-17 | End: 2023-03-17

## 2023-03-16 RX ORDER — POTASSIUM CHLORIDE 20 MEQ/1
40 TABLET, EXTENDED RELEASE ORAL ONCE
Status: COMPLETED | OUTPATIENT
Start: 2023-03-16 | End: 2023-03-16

## 2023-03-16 RX ADMIN — AZELASTINE HYDROCHLORIDE 1 DROP: 0.5 SOLUTION/ DROPS INTRAOCULAR at 08:15

## 2023-03-16 RX ADMIN — TRAZODONE HYDROCHLORIDE 50 MG: 50 TABLET ORAL at 20:50

## 2023-03-16 RX ADMIN — METOPROLOL SUCCINATE 25 MG: 25 TABLET, EXTENDED RELEASE ORAL at 08:13

## 2023-03-16 RX ADMIN — HYDRALAZINE HYDROCHLORIDE 75 MG: 25 TABLET, FILM COATED ORAL at 20:50

## 2023-03-16 RX ADMIN — GUAIFENESIN AND DEXTROMETHORPHAN 10 ML: 100; 10 SYRUP ORAL at 13:58

## 2023-03-16 RX ADMIN — GUAIFENESIN AND DEXTROMETHORPHAN 10 ML: 100; 10 SYRUP ORAL at 01:46

## 2023-03-16 RX ADMIN — ASPIRIN 81 MG: 81 TABLET, COATED ORAL at 08:13

## 2023-03-16 RX ADMIN — LATANOPROST 1 DROP: 50 SOLUTION OPHTHALMIC at 20:48

## 2023-03-16 RX ADMIN — AMIODARONE HYDROCHLORIDE 200 MG: 200 TABLET ORAL at 08:13

## 2023-03-16 RX ADMIN — POTASSIUM CHLORIDE 40 MEQ: 1500 TABLET, EXTENDED RELEASE ORAL at 13:55

## 2023-03-16 RX ADMIN — APIXABAN 2.5 MG: 2.5 TABLET, FILM COATED ORAL at 20:49

## 2023-03-16 RX ADMIN — ATORVASTATIN CALCIUM 40 MG: 40 TABLET, FILM COATED ORAL at 20:49

## 2023-03-16 RX ADMIN — LEVOTHYROXINE SODIUM 100 MCG: 100 TABLET ORAL at 05:37

## 2023-03-16 RX ADMIN — GUAIFENESIN 600 MG: 600 TABLET, EXTENDED RELEASE ORAL at 08:13

## 2023-03-16 RX ADMIN — FERROUS SULFATE TAB 325 MG (65 MG ELEMENTAL FE) 325 MG: 325 (65 FE) TAB at 17:18

## 2023-03-16 RX ADMIN — HYDRALAZINE HYDROCHLORIDE 75 MG: 25 TABLET, FILM COATED ORAL at 13:56

## 2023-03-16 RX ADMIN — POTASSIUM CHLORIDE 40 MEQ: 1500 TABLET, EXTENDED RELEASE ORAL at 20:49

## 2023-03-16 RX ADMIN — SALINE NASAL SPRAY 1 SPRAY: 1.5 SOLUTION NASAL at 21:26

## 2023-03-16 RX ADMIN — FERROUS SULFATE TAB 325 MG (65 MG ELEMENTAL FE) 325 MG: 325 (65 FE) TAB at 08:13

## 2023-03-16 RX ADMIN — NIFEDIPINE 30 MG: 30 TABLET, EXTENDED RELEASE ORAL at 20:49

## 2023-03-16 RX ADMIN — NIFEDIPINE 30 MG: 30 TABLET, EXTENDED RELEASE ORAL at 08:13

## 2023-03-16 RX ADMIN — GUAIFENESIN 600 MG: 600 TABLET, EXTENDED RELEASE ORAL at 20:49

## 2023-03-16 RX ADMIN — HYDRALAZINE HYDROCHLORIDE 75 MG: 25 TABLET, FILM COATED ORAL at 05:36

## 2023-03-16 RX ADMIN — Medication 10 ML: at 08:17

## 2023-03-16 RX ADMIN — Medication 10 ML: at 21:26

## 2023-03-16 RX ADMIN — AZELASTINE HYDROCHLORIDE 1 DROP: 0.5 SOLUTION/ DROPS INTRAOCULAR at 20:48

## 2023-03-16 RX ADMIN — APIXABAN 2.5 MG: 2.5 TABLET, FILM COATED ORAL at 08:13

## 2023-03-16 ASSESSMENT — ENCOUNTER SYMPTOMS
CONSTIPATION: 0
SORE THROAT: 0
COUGH: 0
BACK PAIN: 0
ABDOMINAL PAIN: 0
SHORTNESS OF BREATH: 1
WHEEZING: 0
DIARRHEA: 0
VOMITING: 0
NAUSEA: 0

## 2023-03-16 NOTE — CARE COORDINATION
[address] pre-cert request submitted via [facility].    Requested Facility:  [facility]   Anticipated Admit Date to [facility]:  3/17/2023  [facility] #:  [id number]     Sunita Ren RN, BSN  [phone]

## 2023-03-16 NOTE — CONSULTS
[address] Lomas 11/14/1933    History:  Past Medical History:   Diagnosis Date    Arthritis     Diabetes mellitus ([address]     Hyperlipidemia     Hypertension     Paroxysmal atrial fibrillation ([address]        ECHO:  3/6/23      Summary   *Left ventricle - normal size, thickness and function EF of 65%   *Aortic valve - thickened and calcified, mild stenosis with MG 14mmHg, GLENDA   1.37cm2, trivial regurgitation   *Mitral valve - mild thickening, trivial regurgitation   *Tricuspid valve - moderate regurgitation with PASP of 48mmHg   *Pericardium - trivial effusion       History of sleep apnea: No  [place] Screen ordered: Yes    DM History: Yes  Consult for DM educator: Yes      Last Hospital Admission: 1/9/23 at [address] [place] [address] with hypertensive urgency  Code Status: full code  Discharge plans: from home with [facility] AT [facility] Present: marcos Iglesias was admitted to the hospital with increased shortness of breath. She states HF is a new diagnosis. She does follow with Dr. Saleem Sal as an outpatient. She does own a scale at home. She is unsure of baseline weight. Patient does not add much salt to season or flavor foods. She is compliant with medications. Her sons help bring her to appointments. Patient provided with both written and verbal education on CHF signs/ symptoms, causes, discharge medications, daily weights, low sodium diet, activity, and follow-up. Pt to call if gains 3 pounds in one day or 5 pounds in one week. Mutually agreed upon goals were discussed such as calling the MD as soon as they recognize symptoms and weight gain, maintaining proper diet, taking medications as prescribed, joining cardiac rehab when able. Also reviewed importance of risk factor reduction. Patient provided with CHF Zone Management tool and CHF symptoms magnet.     Discussed importance of lifestyle changes: encourage daily weights    PATIENT/CAREGIVER TEACHING:    Level of
[address]  Cardiac Consult     Referring Provider:  Mary Tovar MD         History of Present Illness:  81 y/o female who we were asked to see for pleural effusion/CHF. She is followed at [facility] cardiology with HTN, Paroxysmal afib and chronic diastolic CHF. She also has chronic renal insufficiency and chronic \"micro aspiration\" and swallowing issues. Baseline creat appears to be ing the 1.6 range. She says she had COVID several week ago. She has been doing fairly well however, until 1-2 days prior to admission. Noticed shortness of breath. Some wheezing. Mild LLE edema. No real associated chest pain. Shortness of breath became severe so she came to the ER. BP severely uncontrolled and placed on nicardipine drip. Also given inhaler. Today she feels somewhat better but remains on 4 liters O2. She was also admitted to [facility] in 1/2023 with HTN urgency and COVID. Also placed on iv Nicardipine drip. Meds increase but she became orthostatic and then decreased. Past Medical History:   has a past medical history of Arthritis, Diabetes mellitus ([facility] Hyperlipidemia, Hypertension, and Paroxysmal atrial fibrillation ([facility] Surgical History:   has a past surgical history that includes Cholecystectomy (1990). Social History:   reports that she has never smoked. She has never used smokeless tobacco. She reports that she does not drink alcohol and does not use drugs. Family History:  Family history is unknown by patient.      Medications:   guaiFENesin  600 mg Oral BID    insulin glargine  20 Units SubCUTAneous QAM AC    ipratropium-albuterol  1 ampule Inhalation Q4H WA    [START ON 3/9/2023] methylPREDNISolone  40 mg IntraVENous Daily    NIFEdipine  30 mg Oral BID    azelastine  1 drop Both Eyes BID    latanoprost  1 drop Both Eyes Nightly    levothyroxine  100 mcg Oral QAM AC    traZODone  50 mg Oral Nightly    furosemide  40 mg IntraVENous BID    metoprolol succinate  50 mg Oral
PALLIATIVE MEDICINE CONSULTATION     Patient name:Dejah Lomas   NPH:[id number]    :1933  Room/Bed:[facility]2916/2916-01   LOS: 8 days         Date of consult:3/13/2023    Consult Information  Palliative Medicine Consult performed by: ADAM Ascencio CNP, CNP    Inpatient consult to Palliative Care  Consult performed by: ADAM Ascencio CNP  Consult ordered by: Sharyle Nipple, APRN - CNS  Reason for consult: GOC and code status      Inpatient consult to Palliative Care  Consult performed by: ADAM Ascencio CNP  Consult ordered by: Nya Stephenson MD             ASSESSMENT/RECOMMENDATIONS     80 y.o. female with Dyspnea and debility with CHF      Symptom Management:  Dyspnea- pt continues to c/o dyspnea with exertion  Debility- pt was independent on admission  now needing assist x 1  Goals of Care- talked to pt and son Farooq by phone regarding pts CHF and pts has been clear that her goal is to focus on quality of life and stay home and independent as long as possible. We discussed Hospice advanced cardiac program [place] updated code to [facility]. Referral faxed to [facility] to meet with pt and son for information    Patient/Family Goals of Care :    talked to pt and son Farooq by phone regarding pts CHF and pts has been clear that her goal is to focus on quality of life and stay home and independent as long as possible. We discussed Hospice advanced cardiac program [place] support at [place] updated code to [facility]. Referral faxed to [facility] to meet with pt and son for information    Disposition/Discharge Plan:   pending    Advance Directives:       The patient has the following advanced directives on file:  [address] [place] [address] ACP-Advance Directive ACP-Power of     Not on File Not on File Not on File Not on File            The patient has appointed the following active healthcare agents:  Farooq-son    The Patient has the following current code
PULMONARY AND CRITICAL CARE MEDICINE CONSULTATION NOTE    CONSULTING PHYSICIAN:  Charlene Qureshi MD    ADMISSION DATE: 3/5/2023  ADMISSION DIAGNOSIS: Shortness of breath [R06.02]  Nausea [R11.0]  Acute pulmonary edema (HCC) [J81.0]  Leg edema [R60.0]  Pleural effusion [J90]  Pleural effusion, bilateral [J90]  Diarrhea, unspecified type [R19.7]    REASON FOR CONSULT:   Chief Complaint   Patient presents with    Shortness of Breath     From home by [facility]. Started on Friday with nausea and woke up today with sob.  +cough x 7 years  left leg red and swollen x 2 weeks  given duo neb by squro[facility]       DATE OF CONSULT: 3/5/2023    HISTORY OF PRESENT ILLNESS: 80y.o. year old female with a past medical history significant for hypertension, heart failure with preserved ejection fraction, paroxysmal atrial fibrillation on chronic anticoagulation, CKD, diabetes presented to the hospital with shortness of breath and bilateral pedal edema. Patient reports that she has been having cough off and on for last 7 years and uses albuterol inhaler at home. No diagnosed history of lung disorder. Recently her cough became more dry and hacking. She did have COVID infection in February 2023 which was mild. 1 month ago she was also started on Lasix by her cardiologist.  Does take 2 antihypertensives. Denies any fevers, night sweats, discolored expectoration, chest pain or chest tightness. She was found to be mildly hypoxic in the ER and required 2 L/min of oxygen supplementation. proBNP was significantly elevated. CTA chest did not show any focal consolidation, acute PE. She was seen to have bilateral pleural effusions. At the time of my evaluation she is sitting in bed in no significant distress. Reports that her breathing has improved. She just received a DuoNeb therapy. Remains hypertensive. Did require Cardene drip initially but currently on oral antihypertensives. Continues to diurese.   Reports that her
Soft, bowel sounds normal, non-tender. Ext: Bilateral lower extremity edema +  Skin: Warm. No rashes appreciated. : bladder non-distended, no tenderness over the bladder  Neuro: Alert and oriented x 3, nonfocal.  Joints: No erythema noted over joints.       DATA:    CBC with Differential:    Lab Results   Component Value Date/Time    WBC 8.7 03/14/2023 02:52 AM    RBC 3.57 03/14/2023 02:52 AM    HGB 9.0 03/14/2023 02:52 AM    HCT 27.9 03/14/2023 02:52 AM     03/14/2023 02:52 AM    MCV 78.2 03/14/2023 02:52 AM    MCH 25.2 03/14/2023 02:52 AM    MCHC 32.2 03/14/2023 02:52 AM    RDW 22.8 03/14/2023 02:52 AM    LYMPHOPCT 7.0 03/14/2023 02:52 AM    MONOPCT 12.0 03/14/2023 02:52 AM    BASOPCT 0.0 03/14/2023 02:52 AM    MONOSABS 1.0 03/14/2023 02:52 AM    LYMPHSABS 0.6 03/14/2023 02:52 AM    EOSABS 0.3 03/14/2023 02:52 AM    BASOSABS 0.0 03/14/2023 02:52 AM     BMP:    Lab Results   Component Value Date/Time     03/16/2023 05:40 AM    K 3.3 03/16/2023 05:40 AM    K 5.0 06/26/2020 06:46 AM    CL 97 03/16/2023 05:40 AM    CO2 28 03/16/2023 05:40 AM    BUN 42 03/16/2023 05:40 AM    LABALBU 3.4 03/05/2023 01:14 PM    CREATININE 1.8 03/16/2023 05:40 AM    CALCIUM 8.7 03/16/2023 05:40 AM    GFRAA 57 06/29/2020 06:59 AM    LABGLOM 27 03/16/2023 05:40 AM    GLUCOSE 126 03/16/2023 05:40 AM     Ionized Calcium:  No results found for: IONCA  Magnesium:    Lab Results   Component Value Date/Time    MG 2.60 03/16/2023 05:40 AM     Phosphorus:  No results found for: PHOS  Last 3 Troponin:    Lab Results   Component Value Date/Time    TROPONINI 0.01 03/05/2023 01:14 PM     U/A:    Lab Results   Component Value Date/Time    COLORU Yellow 03/05/2023 01:14 PM    PROTEINU 100 03/05/2023 01:14 PM    PHUR 7.5 03/05/2023 01:14 PM    WBCUA 1 03/05/2023 01:14 PM    RBCUA 1 03/05/2023 01:14 PM    BACTERIA None Seen 03/05/2023 01:14 PM    CLARITYU Clear 03/05/2023 01:14 PM    SPECGRAV 1.015 03/05/2023 01:14 PM    LEUKOCYTESUR

## 2023-03-16 NOTE — CARE COORDINATION
CM spoke to Fede at [facility] this am [phone] and they can accept patient as long as no higher than 5 liters of oxygen and will d/c on oral lasix. CM updated Mindy that per MD pt will d/c on oral lasix. RN also verified and informed CM that pt was + January 9th for Covid as [facility] was inquiring. [facility] did request rapid covid and CM ordered. CM will start pre cert today. Pt was updated on all the above and is in agreement with plan.     Olivier Avina RN, BSN  [phone]

## 2023-03-17 LAB
ANION GAP SERPL CALCULATED.3IONS-SCNC: 11 MMOL/L (ref 3–16)
ANISOCYTOSIS BLD QL SMEAR: ABNORMAL
BASOPHILS # BLD: 0 K/UL (ref 0–0.2)
BASOPHILS NFR BLD: 0 %
BUN SERPL-MCNC: 42 MG/DL (ref 7–20)
CALCIUM SERPL-MCNC: 8.8 MG/DL (ref 8.3–10.6)
CHLORIDE SERPL-SCNC: 96 MMOL/L (ref 99–110)
CO2 SERPL-SCNC: 26 MMOL/L (ref 21–32)
CREAT SERPL-MCNC: 1.9 MG/DL (ref 0.6–1.2)
CREAT UR-MCNC: 40.8 MG/DL (ref 28–259)
DEPRECATED RDW RBC AUTO: 24.9 % (ref 12.4–15.4)
EOSINOPHIL # BLD: 0.2 K/UL (ref 0–0.6)
EOSINOPHIL NFR BLD: 2 %
GFR SERPLBLD CREATININE-BSD FMLA CKD-EPI: 25 ML/MIN/{1.73_M2}
GLUCOSE BLD-MCNC: 134 MG/DL (ref 70–99)
GLUCOSE BLD-MCNC: 146 MG/DL (ref 70–99)
GLUCOSE BLD-MCNC: 209 MG/DL (ref 70–99)
GLUCOSE SERPL-MCNC: 146 MG/DL (ref 70–99)
HCT VFR BLD AUTO: 29.9 % (ref 36–48)
HGB BLD-MCNC: 9.4 G/DL (ref 12–16)
LACTATE BLDV-SCNC: 0.7 MMOL/L (ref 0.4–1.9)
LYMPHOCYTES # BLD: 0.3 K/UL (ref 1–5.1)
LYMPHOCYTES NFR BLD: 3 %
MCH RBC QN AUTO: 25.3 PG (ref 26–34)
MCHC RBC AUTO-ENTMCNC: 31.5 G/DL (ref 31–36)
MCV RBC AUTO: 80.5 FL (ref 80–100)
METAMYELOCYTES NFR BLD MANUAL: 4 %
MONOCYTES # BLD: 0.5 K/UL (ref 0–1.3)
MONOCYTES NFR BLD: 6 %
NEUTROPHILS # BLD: 7.7 K/UL (ref 1.7–7.7)
NEUTROPHILS NFR BLD: 84 %
NEUTS BAND NFR BLD MANUAL: 1 % (ref 0–7)
NT-PROBNP SERPL-MCNC: 6887 PG/ML (ref 0–449)
OVALOCYTES BLD QL SMEAR: ABNORMAL
PERFORMED ON: ABNORMAL
PLATELET # BLD AUTO: 291 K/UL (ref 135–450)
PLATELET BLD QL SMEAR: ADEQUATE
PMV BLD AUTO: 8 FL (ref 5–10.5)
POLYCHROMASIA BLD QL SMEAR: ABNORMAL
POTASSIUM SERPL-SCNC: 4.7 MMOL/L (ref 3.5–5.1)
PROT UR-MCNC: 30 MG/DL
RBC # BLD AUTO: 3.72 M/UL (ref 4–5.2)
SLIDE REVIEW: ABNORMAL
SODIUM SERPL-SCNC: 133 MMOL/L (ref 136–145)
SODIUM UR-SCNC: 55 MMOL/L
SPHEROCYTES BLD QL SMEAR: ABNORMAL
WBC # BLD AUTO: 8.6 K/UL (ref 4–11)

## 2023-03-17 PROCEDURE — 97116 GAIT TRAINING THERAPY: CPT

## 2023-03-17 PROCEDURE — 6370000000 HC RX 637 (ALT 250 FOR IP): Performed by: INTERNAL MEDICINE

## 2023-03-17 PROCEDURE — 6360000002 HC RX W HCPCS: Performed by: INTERNAL MEDICINE

## 2023-03-17 PROCEDURE — 6370000000 HC RX 637 (ALT 250 FOR IP): Performed by: FAMILY MEDICINE

## 2023-03-17 PROCEDURE — 6360000002 HC RX W HCPCS: Performed by: NURSE PRACTITIONER

## 2023-03-17 PROCEDURE — 82570 ASSAY OF URINE CREATININE: CPT

## 2023-03-17 PROCEDURE — 83605 ASSAY OF LACTIC ACID: CPT

## 2023-03-17 PROCEDURE — 97530 THERAPEUTIC ACTIVITIES: CPT

## 2023-03-17 PROCEDURE — 84300 ASSAY OF URINE SODIUM: CPT

## 2023-03-17 PROCEDURE — 97110 THERAPEUTIC EXERCISES: CPT

## 2023-03-17 PROCEDURE — 84156 ASSAY OF PROTEIN URINE: CPT

## 2023-03-17 PROCEDURE — 85025 COMPLETE CBC W/AUTO DIFF WBC: CPT

## 2023-03-17 PROCEDURE — 2580000003 HC RX 258: Performed by: FAMILY MEDICINE

## 2023-03-17 PROCEDURE — 97535 SELF CARE MNGMENT TRAINING: CPT

## 2023-03-17 PROCEDURE — 83880 ASSAY OF NATRIURETIC PEPTIDE: CPT

## 2023-03-17 PROCEDURE — 6370000000 HC RX 637 (ALT 250 FOR IP): Performed by: CLINICAL NURSE SPECIALIST

## 2023-03-17 PROCEDURE — 1200000000 HC SEMI PRIVATE

## 2023-03-17 PROCEDURE — 80048 BASIC METABOLIC PNL TOTAL CA: CPT

## 2023-03-17 PROCEDURE — 87040 BLOOD CULTURE FOR BACTERIA: CPT

## 2023-03-17 PROCEDURE — 36415 COLL VENOUS BLD VENIPUNCTURE: CPT

## 2023-03-17 PROCEDURE — 92526 ORAL FUNCTION THERAPY: CPT

## 2023-03-17 RX ORDER — FUROSEMIDE 10 MG/ML
40 INJECTION INTRAMUSCULAR; INTRAVENOUS 3 TIMES DAILY
Status: DISCONTINUED | OUTPATIENT
Start: 2023-03-17 | End: 2023-03-21

## 2023-03-17 RX ORDER — FUROSEMIDE 10 MG/ML
40 INJECTION INTRAMUSCULAR; INTRAVENOUS DAILY
Status: DISCONTINUED | OUTPATIENT
Start: 2023-03-17 | End: 2023-03-17

## 2023-03-17 RX ORDER — AMOXICILLIN AND CLAVULANATE POTASSIUM 500; 125 MG/1; MG/1
1 TABLET, FILM COATED ORAL EVERY 12 HOURS SCHEDULED
Status: DISCONTINUED | OUTPATIENT
Start: 2023-03-17 | End: 2023-03-18

## 2023-03-17 RX ADMIN — ATORVASTATIN CALCIUM 40 MG: 40 TABLET, FILM COATED ORAL at 22:32

## 2023-03-17 RX ADMIN — GUAIFENESIN 600 MG: 600 TABLET, EXTENDED RELEASE ORAL at 22:38

## 2023-03-17 RX ADMIN — Medication 10 ML: at 21:00

## 2023-03-17 RX ADMIN — APIXABAN 2.5 MG: 2.5 TABLET, FILM COATED ORAL at 09:32

## 2023-03-17 RX ADMIN — HYDRALAZINE HYDROCHLORIDE 75 MG: 25 TABLET, FILM COATED ORAL at 22:38

## 2023-03-17 RX ADMIN — LEVOTHYROXINE SODIUM 100 MCG: 100 TABLET ORAL at 06:21

## 2023-03-17 RX ADMIN — INSULIN LISPRO 1 UNITS: 100 INJECTION, SOLUTION INTRAVENOUS; SUBCUTANEOUS at 11:39

## 2023-03-17 RX ADMIN — NIFEDIPINE 30 MG: 30 TABLET, EXTENDED RELEASE ORAL at 09:32

## 2023-03-17 RX ADMIN — FERROUS SULFATE TAB 325 MG (65 MG ELEMENTAL FE) 325 MG: 325 (65 FE) TAB at 16:13

## 2023-03-17 RX ADMIN — APIXABAN 2.5 MG: 2.5 TABLET, FILM COATED ORAL at 22:31

## 2023-03-17 RX ADMIN — HYDRALAZINE HYDROCHLORIDE 75 MG: 25 TABLET, FILM COATED ORAL at 13:12

## 2023-03-17 RX ADMIN — FERROUS SULFATE TAB 325 MG (65 MG ELEMENTAL FE) 325 MG: 325 (65 FE) TAB at 09:37

## 2023-03-17 RX ADMIN — AMIODARONE HYDROCHLORIDE 200 MG: 200 TABLET ORAL at 09:31

## 2023-03-17 RX ADMIN — HYDRALAZINE HYDROCHLORIDE 10 MG: 20 INJECTION INTRAMUSCULAR; INTRAVENOUS at 16:07

## 2023-03-17 RX ADMIN — AZELASTINE HYDROCHLORIDE 1 DROP: 0.5 SOLUTION/ DROPS INTRAOCULAR at 09:32

## 2023-03-17 RX ADMIN — FUROSEMIDE 40 MG: 10 INJECTION, SOLUTION INTRAMUSCULAR; INTRAVENOUS at 11:40

## 2023-03-17 RX ADMIN — NIFEDIPINE 30 MG: 30 TABLET, EXTENDED RELEASE ORAL at 22:31

## 2023-03-17 RX ADMIN — ASPIRIN 81 MG: 81 TABLET, COATED ORAL at 09:32

## 2023-03-17 RX ADMIN — HYDRALAZINE HYDROCHLORIDE 75 MG: 25 TABLET, FILM COATED ORAL at 06:21

## 2023-03-17 RX ADMIN — LATANOPROST 1 DROP: 50 SOLUTION OPHTHALMIC at 21:00

## 2023-03-17 RX ADMIN — GUAIFENESIN 600 MG: 600 TABLET, EXTENDED RELEASE ORAL at 09:32

## 2023-03-17 RX ADMIN — AMOXICILLIN AND CLAVULANATE POTASSIUM 1 TABLET: 500; 125 TABLET, FILM COATED ORAL at 22:31

## 2023-03-17 RX ADMIN — AMOXICILLIN AND CLAVULANATE POTASSIUM 1 TABLET: 500; 125 TABLET, FILM COATED ORAL at 11:40

## 2023-03-17 RX ADMIN — AZELASTINE HYDROCHLORIDE 1 DROP: 0.5 SOLUTION/ DROPS INTRAOCULAR at 22:35

## 2023-03-17 RX ADMIN — Medication 10 ML: at 09:33

## 2023-03-17 RX ADMIN — TRAZODONE HYDROCHLORIDE 50 MG: 50 TABLET ORAL at 22:31

## 2023-03-17 RX ADMIN — TORSEMIDE 20 MG: 20 TABLET ORAL at 09:31

## 2023-03-17 RX ADMIN — METOPROLOL SUCCINATE 25 MG: 25 TABLET, EXTENDED RELEASE ORAL at 09:31

## 2023-03-17 RX ADMIN — FUROSEMIDE 40 MG: 10 INJECTION, SOLUTION INTRAMUSCULAR; INTRAVENOUS at 22:38

## 2023-03-17 ASSESSMENT — PAIN SCALES - GENERAL
PAINLEVEL_OUTOF10: 0
PAINLEVEL_OUTOF10: 1
PAINLEVEL_OUTOF10: 0

## 2023-03-17 ASSESSMENT — PAIN SCALES - WONG BAKER: WONGBAKER_NUMERICALRESPONSE: 0

## 2023-03-17 NOTE — CARE COORDINATION
CM messaged MD if [facility]  so I can update facility if she will get them from our pharmacy and message received was : yes. CM checked with outpt pharmacy and they need medications sent still. CM updated MD need to send today if pt discharging tomorrow. Message read.     Cristian Lowe RN, BSN  [phone]

## 2023-03-17 NOTE — CARE COORDINATION
Miranda Delon called [facility] and states their pharmacy does not carry pt's Sharyne Mangle and Optivar that is on her MAR at this time and if she discharges on these medications they would need to come with her. [facility] sent message to MD about the above and if pt will discharge on these medications to send to our outpt pharmacy today. Anticipate d/c tomorrow. Miranda Jernigan has CM/ numbers that are working tomorrow.      Jarod Johnson RN, BSN  [phone]

## 2023-03-17 NOTE — CARE COORDINATION
[address] authorization received via [facility] Access Portal    Plan Auth ID:  [id number]  Pavithra Healy ID:  [id number]  Service:  SNF  Approval Dates:  3/17/2023 - 3/21/2023  Next Review Date:  3/21/2023     CM called Kira Carl at [facility] [phone] and updated pt approved and Covid negative. Pt updated.     Nicol Turner RN, BSN  [phone]

## 2023-03-18 LAB
ANION GAP SERPL CALCULATED.3IONS-SCNC: 11 MMOL/L (ref 3–16)
BACTERIA UR CULT: ABNORMAL
BUN SERPL-MCNC: 36 MG/DL (ref 7–20)
CALCIUM SERPL-MCNC: 8.5 MG/DL (ref 8.3–10.6)
CHLORIDE SERPL-SCNC: 98 MMOL/L (ref 99–110)
CO2 SERPL-SCNC: 27 MMOL/L (ref 21–32)
CREAT SERPL-MCNC: 1.7 MG/DL (ref 0.6–1.2)
GFR SERPLBLD CREATININE-BSD FMLA CKD-EPI: 28 ML/MIN/{1.73_M2}
GLUCOSE BLD-MCNC: 146 MG/DL (ref 70–99)
GLUCOSE BLD-MCNC: 157 MG/DL (ref 70–99)
GLUCOSE BLD-MCNC: 160 MG/DL (ref 70–99)
GLUCOSE BLD-MCNC: 230 MG/DL (ref 70–99)
GLUCOSE SERPL-MCNC: 234 MG/DL (ref 70–99)
ORGANISM: ABNORMAL
PERFORMED ON: ABNORMAL
POTASSIUM SERPL-SCNC: 3.6 MMOL/L (ref 3.5–5.1)
SODIUM SERPL-SCNC: 136 MMOL/L (ref 136–145)

## 2023-03-18 PROCEDURE — 6370000000 HC RX 637 (ALT 250 FOR IP): Performed by: INTERNAL MEDICINE

## 2023-03-18 PROCEDURE — 6360000002 HC RX W HCPCS: Performed by: INTERNAL MEDICINE

## 2023-03-18 PROCEDURE — 99232 SBSQ HOSP IP/OBS MODERATE 35: CPT | Performed by: NURSE PRACTITIONER

## 2023-03-18 PROCEDURE — 80048 BASIC METABOLIC PNL TOTAL CA: CPT

## 2023-03-18 PROCEDURE — 6370000000 HC RX 637 (ALT 250 FOR IP): Performed by: FAMILY MEDICINE

## 2023-03-18 PROCEDURE — 2500000003 HC RX 250 WO HCPCS: Performed by: INTERNAL MEDICINE

## 2023-03-18 PROCEDURE — 2580000003 HC RX 258: Performed by: INTERNAL MEDICINE

## 2023-03-18 PROCEDURE — 1200000000 HC SEMI PRIVATE

## 2023-03-18 PROCEDURE — 6370000000 HC RX 637 (ALT 250 FOR IP): Performed by: CLINICAL NURSE SPECIALIST

## 2023-03-18 PROCEDURE — 2580000003 HC RX 258: Performed by: FAMILY MEDICINE

## 2023-03-18 PROCEDURE — 36415 COLL VENOUS BLD VENIPUNCTURE: CPT

## 2023-03-18 RX ORDER — HYDRALAZINE HYDROCHLORIDE 100 MG/1
100 TABLET, FILM COATED ORAL EVERY 8 HOURS SCHEDULED
Status: DISCONTINUED | OUTPATIENT
Start: 2023-03-18 | End: 2023-03-19

## 2023-03-18 RX ORDER — LABETALOL HYDROCHLORIDE 5 MG/ML
10 INJECTION, SOLUTION INTRAVENOUS ONCE
Status: COMPLETED | OUTPATIENT
Start: 2023-03-18 | End: 2023-03-18

## 2023-03-18 RX ADMIN — FUROSEMIDE 40 MG: 10 INJECTION, SOLUTION INTRAMUSCULAR; INTRAVENOUS at 13:14

## 2023-03-18 RX ADMIN — METOPROLOL SUCCINATE 25 MG: 25 TABLET, EXTENDED RELEASE ORAL at 09:15

## 2023-03-18 RX ADMIN — CEFTRIAXONE SODIUM 1000 MG: 1 INJECTION, POWDER, FOR SOLUTION INTRAMUSCULAR; INTRAVENOUS at 09:33

## 2023-03-18 RX ADMIN — AMIODARONE HYDROCHLORIDE 200 MG: 200 TABLET ORAL at 09:14

## 2023-03-18 RX ADMIN — LATANOPROST 1 DROP: 50 SOLUTION OPHTHALMIC at 23:33

## 2023-03-18 RX ADMIN — FERROUS SULFATE TAB 325 MG (65 MG ELEMENTAL FE) 325 MG: 325 (65 FE) TAB at 16:34

## 2023-03-18 RX ADMIN — HYDRALAZINE HYDROCHLORIDE 100 MG: 100 TABLET, FILM COATED ORAL at 13:14

## 2023-03-18 RX ADMIN — AZELASTINE HYDROCHLORIDE 1 DROP: 0.5 SOLUTION/ DROPS INTRAOCULAR at 15:32

## 2023-03-18 RX ADMIN — HYDRALAZINE HYDROCHLORIDE 75 MG: 25 TABLET, FILM COATED ORAL at 06:07

## 2023-03-18 RX ADMIN — GUAIFENESIN 600 MG: 600 TABLET, EXTENDED RELEASE ORAL at 09:15

## 2023-03-18 RX ADMIN — FUROSEMIDE 40 MG: 10 INJECTION, SOLUTION INTRAMUSCULAR; INTRAVENOUS at 09:15

## 2023-03-18 RX ADMIN — ATORVASTATIN CALCIUM 40 MG: 40 TABLET, FILM COATED ORAL at 21:56

## 2023-03-18 RX ADMIN — ASPIRIN 81 MG: 81 TABLET, COATED ORAL at 09:15

## 2023-03-18 RX ADMIN — FERROUS SULFATE TAB 325 MG (65 MG ELEMENTAL FE) 325 MG: 325 (65 FE) TAB at 13:13

## 2023-03-18 RX ADMIN — LABETALOL HYDROCHLORIDE 10 MG: 5 INJECTION INTRAVENOUS at 23:33

## 2023-03-18 RX ADMIN — AZELASTINE HYDROCHLORIDE 1 DROP: 0.5 SOLUTION/ DROPS INTRAOCULAR at 21:56

## 2023-03-18 RX ADMIN — HYDRALAZINE HYDROCHLORIDE 100 MG: 100 TABLET, FILM COATED ORAL at 21:56

## 2023-03-18 RX ADMIN — Medication 10 ML: at 21:56

## 2023-03-18 RX ADMIN — APIXABAN 2.5 MG: 2.5 TABLET, FILM COATED ORAL at 09:15

## 2023-03-18 RX ADMIN — FUROSEMIDE 40 MG: 10 INJECTION, SOLUTION INTRAMUSCULAR; INTRAVENOUS at 21:56

## 2023-03-18 RX ADMIN — NIFEDIPINE 30 MG: 30 TABLET, EXTENDED RELEASE ORAL at 21:56

## 2023-03-18 RX ADMIN — LEVOTHYROXINE SODIUM 100 MCG: 100 TABLET ORAL at 06:07

## 2023-03-18 RX ADMIN — HYDRALAZINE HYDROCHLORIDE 10 MG: 20 INJECTION INTRAMUSCULAR; INTRAVENOUS at 16:34

## 2023-03-18 RX ADMIN — NIFEDIPINE 30 MG: 30 TABLET, EXTENDED RELEASE ORAL at 09:15

## 2023-03-18 RX ADMIN — APIXABAN 2.5 MG: 2.5 TABLET, FILM COATED ORAL at 21:56

## 2023-03-18 RX ADMIN — Medication 10 ML: at 09:15

## 2023-03-18 RX ADMIN — GUAIFENESIN 600 MG: 600 TABLET, EXTENDED RELEASE ORAL at 21:56

## 2023-03-18 RX ADMIN — TRAZODONE HYDROCHLORIDE 50 MG: 50 TABLET ORAL at 21:56

## 2023-03-18 ASSESSMENT — PAIN SCALES - GENERAL
PAINLEVEL_OUTOF10: 0

## 2023-03-18 ASSESSMENT — PAIN SCALES - WONG BAKER
WONGBAKER_NUMERICALRESPONSE: 0

## 2023-03-19 LAB
ANION GAP SERPL CALCULATED.3IONS-SCNC: 12 MMOL/L (ref 3–16)
ANISOCYTOSIS BLD QL SMEAR: ABNORMAL
BASOPHILS # BLD: 0 K/UL (ref 0–0.2)
BASOPHILS NFR BLD: 0 %
BUN SERPL-MCNC: 32 MG/DL (ref 7–20)
CALCIUM SERPL-MCNC: 8.5 MG/DL (ref 8.3–10.6)
CHLORIDE SERPL-SCNC: 96 MMOL/L (ref 99–110)
CO2 SERPL-SCNC: 28 MMOL/L (ref 21–32)
CREAT SERPL-MCNC: 1.7 MG/DL (ref 0.6–1.2)
DEPRECATED RDW RBC AUTO: 24.8 % (ref 12.4–15.4)
EOSINOPHIL # BLD: 0.2 K/UL (ref 0–0.6)
EOSINOPHIL NFR BLD: 3 %
GFR SERPLBLD CREATININE-BSD FMLA CKD-EPI: 28 ML/MIN/{1.73_M2}
GLUCOSE BLD-MCNC: 152 MG/DL (ref 70–99)
GLUCOSE BLD-MCNC: 162 MG/DL (ref 70–99)
GLUCOSE BLD-MCNC: 197 MG/DL (ref 70–99)
GLUCOSE BLD-MCNC: 221 MG/DL (ref 70–99)
GLUCOSE SERPL-MCNC: 141 MG/DL (ref 70–99)
HCT VFR BLD AUTO: 28.2 % (ref 36–48)
HGB BLD-MCNC: 9 G/DL (ref 12–16)
HYPOCHROMIA BLD QL SMEAR: ABNORMAL
LYMPHOCYTES # BLD: 0.6 K/UL (ref 1–5.1)
LYMPHOCYTES NFR BLD: 6 %
MACROCYTES BLD QL SMEAR: ABNORMAL
MCH RBC QN AUTO: 25.8 PG (ref 26–34)
MCHC RBC AUTO-ENTMCNC: 31.8 G/DL (ref 31–36)
MCV RBC AUTO: 81.2 FL (ref 80–100)
MICROCYTES BLD QL SMEAR: ABNORMAL
MONOCYTES # BLD: 1.1 K/UL (ref 0–1.3)
MONOCYTES NFR BLD: 14 %
MYELOCYTES NFR BLD MANUAL: 2 %
NEUTROPHILS # BLD: 6 K/UL (ref 1.7–7.7)
NEUTROPHILS NFR BLD: 73 %
NEUTS BAND NFR BLD MANUAL: 1 % (ref 0–7)
NT-PROBNP SERPL-MCNC: 5732 PG/ML (ref 0–449)
OVALOCYTES BLD QL SMEAR: ABNORMAL
PERFORMED ON: ABNORMAL
PLATELET # BLD AUTO: 259 K/UL (ref 135–450)
PLATELET BLD QL SMEAR: ADEQUATE
PMV BLD AUTO: 7.9 FL (ref 5–10.5)
POTASSIUM SERPL-SCNC: 3.3 MMOL/L (ref 3.5–5.1)
RBC # BLD AUTO: 3.47 M/UL (ref 4–5.2)
SLIDE REVIEW: ABNORMAL
SODIUM SERPL-SCNC: 136 MMOL/L (ref 136–145)
TARGETS BLD QL SMEAR: ABNORMAL
VARIANT LYMPHS NFR BLD MANUAL: 1 % (ref 0–6)
WBC # BLD AUTO: 7.9 K/UL (ref 4–11)

## 2023-03-19 PROCEDURE — 2580000003 HC RX 258: Performed by: FAMILY MEDICINE

## 2023-03-19 PROCEDURE — 83880 ASSAY OF NATRIURETIC PEPTIDE: CPT

## 2023-03-19 PROCEDURE — 99232 SBSQ HOSP IP/OBS MODERATE 35: CPT | Performed by: NURSE PRACTITIONER

## 2023-03-19 PROCEDURE — 6360000002 HC RX W HCPCS: Performed by: INTERNAL MEDICINE

## 2023-03-19 PROCEDURE — 1200000000 HC SEMI PRIVATE

## 2023-03-19 PROCEDURE — 36415 COLL VENOUS BLD VENIPUNCTURE: CPT

## 2023-03-19 PROCEDURE — 6370000000 HC RX 637 (ALT 250 FOR IP): Performed by: FAMILY MEDICINE

## 2023-03-19 PROCEDURE — 2580000003 HC RX 258: Performed by: INTERNAL MEDICINE

## 2023-03-19 PROCEDURE — 6370000000 HC RX 637 (ALT 250 FOR IP): Performed by: INTERNAL MEDICINE

## 2023-03-19 PROCEDURE — 80048 BASIC METABOLIC PNL TOTAL CA: CPT

## 2023-03-19 PROCEDURE — 6370000000 HC RX 637 (ALT 250 FOR IP): Performed by: NURSE PRACTITIONER

## 2023-03-19 PROCEDURE — 85025 COMPLETE CBC W/AUTO DIFF WBC: CPT

## 2023-03-19 RX ORDER — NIFEDIPINE 30 MG/1
60 TABLET, EXTENDED RELEASE ORAL NIGHTLY
Status: DISCONTINUED | OUTPATIENT
Start: 2023-03-19 | End: 2023-03-19

## 2023-03-19 RX ORDER — NIFEDIPINE 30 MG/1
30 TABLET, EXTENDED RELEASE ORAL
Status: DISCONTINUED | OUTPATIENT
Start: 2023-03-20 | End: 2023-03-19

## 2023-03-19 RX ORDER — NIFEDIPINE 30 MG/1
60 TABLET, EXTENDED RELEASE ORAL 2 TIMES DAILY
Status: DISCONTINUED | OUTPATIENT
Start: 2023-03-19 | End: 2023-03-19

## 2023-03-19 RX ORDER — NIFEDIPINE 30 MG/1
60 TABLET, EXTENDED RELEASE ORAL 2 TIMES DAILY
Status: DISCONTINUED | OUTPATIENT
Start: 2023-03-19 | End: 2023-03-21 | Stop reason: HOSPADM

## 2023-03-19 RX ORDER — HYDRALAZINE HYDROCHLORIDE 25 MG/1
50 TABLET, FILM COATED ORAL EVERY 8 HOURS SCHEDULED
Status: DISCONTINUED | OUTPATIENT
Start: 2023-03-19 | End: 2023-03-21 | Stop reason: HOSPADM

## 2023-03-19 RX ADMIN — TRAZODONE HYDROCHLORIDE 50 MG: 50 TABLET ORAL at 21:51

## 2023-03-19 RX ADMIN — POTASSIUM BICARBONATE 50 MEQ: 978 TABLET, EFFERVESCENT ORAL at 11:39

## 2023-03-19 RX ADMIN — FERROUS SULFATE TAB 325 MG (65 MG ELEMENTAL FE) 325 MG: 325 (65 FE) TAB at 08:26

## 2023-03-19 RX ADMIN — FUROSEMIDE 40 MG: 10 INJECTION, SOLUTION INTRAMUSCULAR; INTRAVENOUS at 21:51

## 2023-03-19 RX ADMIN — FUROSEMIDE 40 MG: 10 INJECTION, SOLUTION INTRAMUSCULAR; INTRAVENOUS at 08:26

## 2023-03-19 RX ADMIN — HYDRALAZINE HYDROCHLORIDE 100 MG: 100 TABLET, FILM COATED ORAL at 05:10

## 2023-03-19 RX ADMIN — AZELASTINE HYDROCHLORIDE 1 DROP: 0.5 SOLUTION/ DROPS INTRAOCULAR at 21:51

## 2023-03-19 RX ADMIN — FUROSEMIDE 40 MG: 10 INJECTION, SOLUTION INTRAMUSCULAR; INTRAVENOUS at 14:15

## 2023-03-19 RX ADMIN — Medication 10 ML: at 21:51

## 2023-03-19 RX ADMIN — APIXABAN 2.5 MG: 2.5 TABLET, FILM COATED ORAL at 08:26

## 2023-03-19 RX ADMIN — FERROUS SULFATE TAB 325 MG (65 MG ELEMENTAL FE) 325 MG: 325 (65 FE) TAB at 16:42

## 2023-03-19 RX ADMIN — AZELASTINE HYDROCHLORIDE 1 DROP: 0.5 SOLUTION/ DROPS INTRAOCULAR at 08:27

## 2023-03-19 RX ADMIN — GUAIFENESIN 600 MG: 600 TABLET, EXTENDED RELEASE ORAL at 21:51

## 2023-03-19 RX ADMIN — NIFEDIPINE 30 MG: 30 TABLET, EXTENDED RELEASE ORAL at 08:26

## 2023-03-19 RX ADMIN — AMIODARONE HYDROCHLORIDE 200 MG: 200 TABLET ORAL at 08:26

## 2023-03-19 RX ADMIN — GUAIFENESIN 600 MG: 600 TABLET, EXTENDED RELEASE ORAL at 08:26

## 2023-03-19 RX ADMIN — HYDRALAZINE HYDROCHLORIDE 10 MG: 20 INJECTION INTRAMUSCULAR; INTRAVENOUS at 16:42

## 2023-03-19 RX ADMIN — INSULIN LISPRO 1 UNITS: 100 INJECTION, SOLUTION INTRAVENOUS; SUBCUTANEOUS at 11:42

## 2023-03-19 RX ADMIN — ASPIRIN 81 MG: 81 TABLET, COATED ORAL at 08:26

## 2023-03-19 RX ADMIN — NIFEDIPINE 60 MG: 30 TABLET, EXTENDED RELEASE ORAL at 21:51

## 2023-03-19 RX ADMIN — LEVOTHYROXINE SODIUM 100 MCG: 100 TABLET ORAL at 05:10

## 2023-03-19 RX ADMIN — HYDRALAZINE HYDROCHLORIDE 100 MG: 100 TABLET, FILM COATED ORAL at 14:15

## 2023-03-19 RX ADMIN — CEFTRIAXONE SODIUM 1000 MG: 1 INJECTION, POWDER, FOR SOLUTION INTRAMUSCULAR; INTRAVENOUS at 08:30

## 2023-03-19 RX ADMIN — ATORVASTATIN CALCIUM 40 MG: 40 TABLET, FILM COATED ORAL at 21:50

## 2023-03-19 RX ADMIN — HYDRALAZINE HYDROCHLORIDE 50 MG: 25 TABLET, FILM COATED ORAL at 21:51

## 2023-03-19 RX ADMIN — LATANOPROST 1 DROP: 50 SOLUTION OPHTHALMIC at 21:51

## 2023-03-19 RX ADMIN — Medication 10 ML: at 08:33

## 2023-03-19 RX ADMIN — APIXABAN 2.5 MG: 2.5 TABLET, FILM COATED ORAL at 21:50

## 2023-03-19 ASSESSMENT — PAIN SCALES - GENERAL
PAINLEVEL_OUTOF10: 0
PAINLEVEL_OUTOF10: 0

## 2023-03-20 LAB
ANION GAP SERPL CALCULATED.3IONS-SCNC: 12 MMOL/L (ref 3–16)
ANISOCYTOSIS BLD QL SMEAR: ABNORMAL
BASOPHILS # BLD: 0 K/UL (ref 0–0.2)
BASOPHILS NFR BLD: 0 %
BUN SERPL-MCNC: 30 MG/DL (ref 7–20)
CALCIUM SERPL-MCNC: 8.8 MG/DL (ref 8.3–10.6)
CHLORIDE SERPL-SCNC: 94 MMOL/L (ref 99–110)
CO2 SERPL-SCNC: 30 MMOL/L (ref 21–32)
CREAT SERPL-MCNC: 1.6 MG/DL (ref 0.6–1.2)
DEPRECATED RDW RBC AUTO: 25.4 % (ref 12.4–15.4)
EOSINOPHIL # BLD: 0.5 K/UL (ref 0–0.6)
EOSINOPHIL NFR BLD: 7 %
GFR SERPLBLD CREATININE-BSD FMLA CKD-EPI: 31 ML/MIN/{1.73_M2}
GLUCOSE BLD-MCNC: 150 MG/DL (ref 70–99)
GLUCOSE BLD-MCNC: 164 MG/DL (ref 70–99)
GLUCOSE BLD-MCNC: 169 MG/DL (ref 70–99)
GLUCOSE BLD-MCNC: 171 MG/DL (ref 70–99)
GLUCOSE SERPL-MCNC: 144 MG/DL (ref 70–99)
HCT VFR BLD AUTO: 29.1 % (ref 36–48)
HGB BLD-MCNC: 9.3 G/DL (ref 12–16)
HYPOCHROMIA BLD QL SMEAR: ABNORMAL
LYMPHOCYTES # BLD: 0.1 K/UL (ref 1–5.1)
LYMPHOCYTES NFR BLD: 2 %
MCH RBC QN AUTO: 25.9 PG (ref 26–34)
MCHC RBC AUTO-ENTMCNC: 31.9 G/DL (ref 31–36)
MCV RBC AUTO: 81 FL (ref 80–100)
METAMYELOCYTES NFR BLD MANUAL: 1 %
MICROCYTES BLD QL SMEAR: ABNORMAL
MONOCYTES # BLD: 0.2 K/UL (ref 0–1.3)
MONOCYTES NFR BLD: 3 %
MYELOCYTES NFR BLD MANUAL: 3 %
NEUTROPHILS # BLD: 6.5 K/UL (ref 1.7–7.7)
NEUTROPHILS NFR BLD: 80 %
NEUTS BAND NFR BLD MANUAL: 4 % (ref 0–7)
OVALOCYTES BLD QL SMEAR: ABNORMAL
PERFORMED ON: ABNORMAL
PLATELET # BLD AUTO: 252 K/UL (ref 135–450)
PLATELET BLD QL SMEAR: ADEQUATE
PMV BLD AUTO: 8.1 FL (ref 5–10.5)
POTASSIUM SERPL-SCNC: 3.5 MMOL/L (ref 3.5–5.1)
RBC # BLD AUTO: 3.59 M/UL (ref 4–5.2)
SLIDE REVIEW: ABNORMAL
SODIUM SERPL-SCNC: 136 MMOL/L (ref 136–145)
WBC # BLD AUTO: 7.4 K/UL (ref 4–11)

## 2023-03-20 PROCEDURE — 2580000003 HC RX 258: Performed by: INTERNAL MEDICINE

## 2023-03-20 PROCEDURE — 85025 COMPLETE CBC W/AUTO DIFF WBC: CPT

## 2023-03-20 PROCEDURE — 99232 SBSQ HOSP IP/OBS MODERATE 35: CPT | Performed by: CLINICAL NURSE SPECIALIST

## 2023-03-20 PROCEDURE — 6370000000 HC RX 637 (ALT 250 FOR IP): Performed by: INTERNAL MEDICINE

## 2023-03-20 PROCEDURE — 6360000002 HC RX W HCPCS: Performed by: INTERNAL MEDICINE

## 2023-03-20 PROCEDURE — 92526 ORAL FUNCTION THERAPY: CPT

## 2023-03-20 PROCEDURE — 36415 COLL VENOUS BLD VENIPUNCTURE: CPT

## 2023-03-20 PROCEDURE — 97530 THERAPEUTIC ACTIVITIES: CPT

## 2023-03-20 PROCEDURE — 80048 BASIC METABOLIC PNL TOTAL CA: CPT

## 2023-03-20 PROCEDURE — 6370000000 HC RX 637 (ALT 250 FOR IP): Performed by: FAMILY MEDICINE

## 2023-03-20 PROCEDURE — 2580000003 HC RX 258: Performed by: FAMILY MEDICINE

## 2023-03-20 PROCEDURE — 1200000000 HC SEMI PRIVATE

## 2023-03-20 RX ADMIN — AMIODARONE HYDROCHLORIDE 200 MG: 200 TABLET ORAL at 08:58

## 2023-03-20 RX ADMIN — HYDRALAZINE HYDROCHLORIDE 50 MG: 25 TABLET, FILM COATED ORAL at 06:45

## 2023-03-20 RX ADMIN — GUAIFENESIN 600 MG: 600 TABLET, EXTENDED RELEASE ORAL at 08:58

## 2023-03-20 RX ADMIN — TRAZODONE HYDROCHLORIDE 50 MG: 50 TABLET ORAL at 22:01

## 2023-03-20 RX ADMIN — FUROSEMIDE 40 MG: 10 INJECTION, SOLUTION INTRAMUSCULAR; INTRAVENOUS at 13:39

## 2023-03-20 RX ADMIN — FERROUS SULFATE TAB 325 MG (65 MG ELEMENTAL FE) 325 MG: 325 (65 FE) TAB at 17:09

## 2023-03-20 RX ADMIN — FUROSEMIDE 40 MG: 10 INJECTION, SOLUTION INTRAMUSCULAR; INTRAVENOUS at 19:49

## 2023-03-20 RX ADMIN — AZELASTINE HYDROCHLORIDE 1 DROP: 0.5 SOLUTION/ DROPS INTRAOCULAR at 19:50

## 2023-03-20 RX ADMIN — ASPIRIN 81 MG: 81 TABLET, COATED ORAL at 08:57

## 2023-03-20 RX ADMIN — NIFEDIPINE 60 MG: 30 TABLET, EXTENDED RELEASE ORAL at 08:58

## 2023-03-20 RX ADMIN — GUAIFENESIN 600 MG: 600 TABLET, EXTENDED RELEASE ORAL at 19:49

## 2023-03-20 RX ADMIN — AZELASTINE HYDROCHLORIDE 1 DROP: 0.5 SOLUTION/ DROPS INTRAOCULAR at 08:57

## 2023-03-20 RX ADMIN — HYDRALAZINE HYDROCHLORIDE 10 MG: 20 INJECTION INTRAMUSCULAR; INTRAVENOUS at 17:20

## 2023-03-20 RX ADMIN — LEVOTHYROXINE SODIUM 100 MCG: 100 TABLET ORAL at 06:45

## 2023-03-20 RX ADMIN — GUAIFENESIN AND DEXTROMETHORPHAN 10 ML: 100; 10 SYRUP ORAL at 01:20

## 2023-03-20 RX ADMIN — APIXABAN 2.5 MG: 2.5 TABLET, FILM COATED ORAL at 19:49

## 2023-03-20 RX ADMIN — FERROUS SULFATE TAB 325 MG (65 MG ELEMENTAL FE) 325 MG: 325 (65 FE) TAB at 08:57

## 2023-03-20 RX ADMIN — Medication 10 ML: at 21:58

## 2023-03-20 RX ADMIN — LATANOPROST 1 DROP: 50 SOLUTION OPHTHALMIC at 22:01

## 2023-03-20 RX ADMIN — HYDRALAZINE HYDROCHLORIDE 50 MG: 25 TABLET, FILM COATED ORAL at 13:34

## 2023-03-20 RX ADMIN — APIXABAN 2.5 MG: 2.5 TABLET, FILM COATED ORAL at 08:58

## 2023-03-20 RX ADMIN — CEFTRIAXONE SODIUM 1000 MG: 1 INJECTION, POWDER, FOR SOLUTION INTRAMUSCULAR; INTRAVENOUS at 09:09

## 2023-03-20 RX ADMIN — FUROSEMIDE 40 MG: 10 INJECTION, SOLUTION INTRAMUSCULAR; INTRAVENOUS at 08:57

## 2023-03-20 RX ADMIN — NIFEDIPINE 60 MG: 30 TABLET, EXTENDED RELEASE ORAL at 19:49

## 2023-03-20 RX ADMIN — Medication 10 ML: at 08:58

## 2023-03-20 RX ADMIN — ATORVASTATIN CALCIUM 40 MG: 40 TABLET, FILM COATED ORAL at 19:49

## 2023-03-20 ASSESSMENT — PAIN SCALES - GENERAL
PAINLEVEL_OUTOF10: 0
PAINLEVEL_OUTOF10: 0

## 2023-03-21 VITALS
HEART RATE: 62 BPM | RESPIRATION RATE: 18 BRPM | DIASTOLIC BLOOD PRESSURE: 54 MMHG | HEIGHT: 63 IN | TEMPERATURE: 97.3 F | SYSTOLIC BLOOD PRESSURE: 153 MMHG | BODY MASS INDEX: 25.21 KG/M2 | OXYGEN SATURATION: 95 % | WEIGHT: 142.3 LBS

## 2023-03-21 LAB
ANION GAP SERPL CALCULATED.3IONS-SCNC: 9 MMOL/L (ref 3–16)
ANISOCYTOSIS BLD QL SMEAR: ABNORMAL
BACTERIA BLD CULT ORG #2: NORMAL
BACTERIA BLD CULT: NORMAL
BASOPHILS # BLD: 0 K/UL (ref 0–0.2)
BASOPHILS NFR BLD: 0 %
BUN SERPL-MCNC: 26 MG/DL (ref 7–20)
CALCIUM SERPL-MCNC: 8.8 MG/DL (ref 8.3–10.6)
CHLORIDE SERPL-SCNC: 94 MMOL/L (ref 99–110)
CO2 SERPL-SCNC: 32 MMOL/L (ref 21–32)
CREAT SERPL-MCNC: 1.7 MG/DL (ref 0.6–1.2)
DEPRECATED RDW RBC AUTO: 25.8 % (ref 12.4–15.4)
EOSINOPHIL # BLD: 0.4 K/UL (ref 0–0.6)
EOSINOPHIL NFR BLD: 5 %
GFR SERPLBLD CREATININE-BSD FMLA CKD-EPI: 28 ML/MIN/{1.73_M2}
GLUCOSE BLD-MCNC: 128 MG/DL (ref 70–99)
GLUCOSE BLD-MCNC: 135 MG/DL (ref 70–99)
GLUCOSE BLD-MCNC: 203 MG/DL (ref 70–99)
GLUCOSE SERPL-MCNC: 120 MG/DL (ref 70–99)
HCT VFR BLD AUTO: 28.6 % (ref 36–48)
HGB BLD-MCNC: 9.1 G/DL (ref 12–16)
HYPOCHROMIA BLD QL SMEAR: ABNORMAL
LYMPHOCYTES # BLD: 0.6 K/UL (ref 1–5.1)
LYMPHOCYTES NFR BLD: 8 %
MCH RBC QN AUTO: 25.8 PG (ref 26–34)
MCHC RBC AUTO-ENTMCNC: 31.9 G/DL (ref 31–36)
MCV RBC AUTO: 80.7 FL (ref 80–100)
MONOCYTES # BLD: 0.2 K/UL (ref 0–1.3)
MONOCYTES NFR BLD: 3 %
NEUTROPHILS # BLD: 6 K/UL (ref 1.7–7.7)
NEUTROPHILS NFR BLD: 82 %
NEUTS BAND NFR BLD MANUAL: 2 % (ref 0–7)
OVALOCYTES BLD QL SMEAR: ABNORMAL
PERFORMED ON: ABNORMAL
PLATELET # BLD AUTO: 241 K/UL (ref 135–450)
PMV BLD AUTO: 7.7 FL (ref 5–10.5)
POTASSIUM SERPL-SCNC: 3.3 MMOL/L (ref 3.5–5.1)
RBC # BLD AUTO: 3.54 M/UL (ref 4–5.2)
SODIUM SERPL-SCNC: 135 MMOL/L (ref 136–145)
TARGETS BLD QL SMEAR: ABNORMAL
WBC # BLD AUTO: 7.1 K/UL (ref 4–11)

## 2023-03-21 PROCEDURE — 6370000000 HC RX 637 (ALT 250 FOR IP): Performed by: HOSPITALIST

## 2023-03-21 PROCEDURE — 6370000000 HC RX 637 (ALT 250 FOR IP): Performed by: INTERNAL MEDICINE

## 2023-03-21 PROCEDURE — 97530 THERAPEUTIC ACTIVITIES: CPT

## 2023-03-21 PROCEDURE — 80048 BASIC METABOLIC PNL TOTAL CA: CPT

## 2023-03-21 PROCEDURE — 6370000000 HC RX 637 (ALT 250 FOR IP): Performed by: FAMILY MEDICINE

## 2023-03-21 PROCEDURE — 85025 COMPLETE CBC W/AUTO DIFF WBC: CPT

## 2023-03-21 PROCEDURE — 92526 ORAL FUNCTION THERAPY: CPT

## 2023-03-21 PROCEDURE — 97535 SELF CARE MNGMENT TRAINING: CPT

## 2023-03-21 PROCEDURE — 2580000003 HC RX 258: Performed by: FAMILY MEDICINE

## 2023-03-21 PROCEDURE — 36415 COLL VENOUS BLD VENIPUNCTURE: CPT

## 2023-03-21 PROCEDURE — 97116 GAIT TRAINING THERAPY: CPT

## 2023-03-21 RX ORDER — AZELASTINE HYDROCHLORIDE 0.5 MG/ML
1 SOLUTION/ DROPS OPHTHALMIC 2 TIMES DAILY
Qty: 1 EACH | Refills: 1 | Status: SHIPPED | OUTPATIENT
Start: 2023-03-21 | End: 2023-04-20

## 2023-03-21 RX ORDER — POTASSIUM CHLORIDE 20 MEQ/1
40 TABLET, EXTENDED RELEASE ORAL ONCE
Status: COMPLETED | OUTPATIENT
Start: 2023-03-21 | End: 2023-03-21

## 2023-03-21 RX ORDER — GUAIFENESIN/DEXTROMETHORPHAN 100-10MG/5
10 SYRUP ORAL EVERY 4 HOURS PRN
Qty: 120 ML | Refills: 0
Start: 2023-03-21 | End: 2023-03-26

## 2023-03-21 RX ORDER — CARVEDILOL 6.25 MG/1
12.5 TABLET ORAL 2 TIMES DAILY WITH MEALS
Status: DISCONTINUED | OUTPATIENT
Start: 2023-03-21 | End: 2023-03-21 | Stop reason: HOSPADM

## 2023-03-21 RX ORDER — TORSEMIDE 20 MG/1
20 TABLET ORAL DAILY
Status: DISCONTINUED | OUTPATIENT
Start: 2023-03-21 | End: 2023-03-21 | Stop reason: HOSPADM

## 2023-03-21 RX ORDER — FERROUS SULFATE 325(65) MG
325 TABLET ORAL 2 TIMES DAILY WITH MEALS
Qty: 30 TABLET | Refills: 0
Start: 2023-03-21

## 2023-03-21 RX ORDER — CARVEDILOL 12.5 MG/1
12.5 TABLET ORAL 2 TIMES DAILY WITH MEALS
Qty: 60 TABLET | Refills: 3
Start: 2023-03-21

## 2023-03-21 RX ORDER — NIFEDIPINE 30 MG/1
60 TABLET, EXTENDED RELEASE ORAL 2 TIMES DAILY
Qty: 30 TABLET | Refills: 0
Start: 2023-03-21

## 2023-03-21 RX ORDER — TORSEMIDE 20 MG/1
20 TABLET ORAL DAILY
Qty: 30 TABLET | Refills: 0
Start: 2023-03-22

## 2023-03-21 RX ORDER — HYDRALAZINE HYDROCHLORIDE 50 MG/1
50 TABLET, FILM COATED ORAL EVERY 8 HOURS SCHEDULED
Qty: 90 TABLET | Refills: 3
Start: 2023-03-21

## 2023-03-21 RX ADMIN — NIFEDIPINE 60 MG: 30 TABLET, EXTENDED RELEASE ORAL at 08:42

## 2023-03-21 RX ADMIN — ASPIRIN 81 MG: 81 TABLET, COATED ORAL at 08:42

## 2023-03-21 RX ADMIN — APIXABAN 2.5 MG: 2.5 TABLET, FILM COATED ORAL at 08:42

## 2023-03-21 RX ADMIN — TORSEMIDE 20 MG: 20 TABLET ORAL at 08:42

## 2023-03-21 RX ADMIN — LEVOTHYROXINE SODIUM 100 MCG: 100 TABLET ORAL at 06:24

## 2023-03-21 RX ADMIN — POTASSIUM CHLORIDE 40 MEQ: 1500 TABLET, EXTENDED RELEASE ORAL at 11:58

## 2023-03-21 RX ADMIN — HYDRALAZINE HYDROCHLORIDE 50 MG: 25 TABLET, FILM COATED ORAL at 13:32

## 2023-03-21 RX ADMIN — CARVEDILOL 12.5 MG: 6.25 TABLET, FILM COATED ORAL at 17:28

## 2023-03-21 RX ADMIN — FERROUS SULFATE TAB 325 MG (65 MG ELEMENTAL FE) 325 MG: 325 (65 FE) TAB at 08:42

## 2023-03-21 RX ADMIN — AZELASTINE HYDROCHLORIDE 1 DROP: 0.5 SOLUTION/ DROPS INTRAOCULAR at 08:42

## 2023-03-21 RX ADMIN — Medication 10 ML: at 08:42

## 2023-03-21 RX ADMIN — HYDRALAZINE HYDROCHLORIDE 50 MG: 25 TABLET, FILM COATED ORAL at 06:27

## 2023-03-21 RX ADMIN — CARVEDILOL 12.5 MG: 6.25 TABLET, FILM COATED ORAL at 11:58

## 2023-03-21 RX ADMIN — AMIODARONE HYDROCHLORIDE 200 MG: 200 TABLET ORAL at 08:42

## 2023-03-21 RX ADMIN — GUAIFENESIN 600 MG: 600 TABLET, EXTENDED RELEASE ORAL at 08:42

## 2023-03-21 RX ADMIN — INSULIN LISPRO 1 UNITS: 100 INJECTION, SOLUTION INTRAVENOUS; SUBCUTANEOUS at 11:57

## 2023-03-21 RX ADMIN — FERROUS SULFATE TAB 325 MG (65 MG ELEMENTAL FE) 325 MG: 325 (65 FE) TAB at 17:28

## 2023-03-21 ASSESSMENT — PAIN SCALES - GENERAL: PAINLEVEL_OUTOF10: 0

## 2023-03-21 NOTE — CARE COORDINATION
Discharge Plan:     Patient discharged to:    [facility]   [address] [facility], [address]    SW/DC Planner faxed, [address] and JAINE to: [phone]    Narcotic Prescriptions faxed were: N/A    RN: will call report to: [phone]        Medical Transport with: [facility] [phone]     time: 2015    Family advised of discharge? Yes, Manuel Cardenas, son    ADIS Submitted?:  yes    All discharge needs met per case management.       TRACY PegueroN RN    [facility]  Phone: [phone]

## 2023-03-21 NOTE — PLAN OF CARE
Problem: Discharge Planning  Goal: Discharge to home or other facility with appropriate resources  Outcome: Progressing     Problem: Safety - Adult  Goal: Free from fall injury  Outcome: Progressing     Problem: ABCDS Injury Assessment  Goal: Absence of physical injury  Outcome: Progressing     Problem: Chronic Conditions and Co-morbidities  Goal: Patient's chronic conditions and co-morbidity symptoms are monitored and maintained or improved  Outcome: Progressing     Problem: Nutrition Deficit:  Goal: Optimize nutritional status  Outcome: Progressing     Problem: Skin/Tissue Integrity  Goal: Absence of new skin breakdown  Description: 1.  Monitor for areas of redness and/or skin breakdown  2.  Assess vascular access sites hourly  3.  Every 4-6 hours minimum:  Change oxygen saturation probe site  4.  Every 4-6 hours:  If on nasal continuous positive airway pressure, respiratory therapy assess nares and determine need for appliance change or resting period.  Outcome: Progressing     
Problem: ABCDS Injury Assessment  Goal: Absence of physical injury  3/19/2023 2223 by Brunilda Miller RN  Outcome: Progressing  Flowsheets (Taken 3/19/2023 2223)  Absence of Physical Injury: Implement safety measures based on patient assessment     Problem: Chronic Conditions and Co-morbidities  Goal: Patient's chronic conditions and co-morbidity symptoms are monitored and maintained or improved  3/19/2023 2223 by Brunilda Miller RN  Outcome: Progressing  Flowsheets (Taken 3/19/2023 2223)  Care Plan - Patient's Chronic Conditions and Co-Morbidity Symptoms are Monitored and Maintained or Improved: Monitor and assess patient's chronic conditions and comorbid symptoms for stability, deterioration, or improvement     Problem: Respiratory - Adult  Goal: Achieves optimal ventilation and oxygenation  3/19/2023 2223 by Brunilda Miller RN  Outcome: Progressing  Flowsheets (Taken 3/19/2023 2223)  Achieves optimal ventilation and oxygenation: Oxygen supplementation based on oxygen saturation or arterial blood gases     Problem: Cardiovascular - Adult  Goal: Maintains optimal cardiac output and hemodynamic stability  3/19/2023 2223 by Brunilda Miller RN  Outcome: Progressing  Flowsheets (Taken 3/19/2023 2223)  Maintains optimal cardiac output and hemodynamic stability: Monitor urine output and notify Licensed Independent Practitioner for values outside of normal range
Problem: ABCDS Injury Assessment  Goal: Absence of physical injury  Outcome: Progressing     Problem: Chronic Conditions and Co-morbidities  Goal: Patient's chronic conditions and co-morbidity symptoms are monitored and maintained or improved  Outcome: Progressing
Problem: Chronic Conditions and Co-morbidities  Goal: Patient's chronic conditions and co-morbidity symptoms are monitored and maintained or improved  Outcome: Not Progressing  Note: Currently  needs Cardene drip for uncontrolled hypertension     Problem: Safety - Adult  Goal: Free from fall injury  Outcome: Progressing
Problem: Discharge Planning  Goal: Discharge to home or other facility with appropriate resources  3/14/2023 0002 by Pablo Aranda RN  Outcome: Progressing  3/13/2023 1717 by Rolly Bashir RN  Outcome: Progressing     Problem: Safety - Adult  Goal: Free from fall injury  3/14/2023 0002 by Pablo Aranda RN  Outcome: Progressing  3/13/2023 2350 by Karene Romberg, RN  Outcome: Progressing  Flowsheets (Taken 3/13/2023 2350)  Free From Fall Injury:   Instruct family/caregiver on patient safety   Based on caregiver fall risk screen, instruct family/caregiver to ask for assistance with transferring infant if caregiver noted to have fall risk factors  3/13/2023 1717 by Rolly Bashir RN  Outcome: Progressing     Problem: ABCDS Injury Assessment  Goal: Absence of physical injury  3/14/2023 0002 by Pablo Aranda RN  Outcome: Progressing  3/13/2023 1717 by Rolly Bashir RN  Outcome: Progressing     Problem: Chronic Conditions and Co-morbidities  Goal: Patient's chronic conditions and co-morbidity symptoms are monitored and maintained or improved  3/14/2023 0002 by Pablo Aranda RN  Outcome: Progressing  3/13/2023 1717 by Rolly Bashir RN  Outcome: Progressing     Problem: Nutrition Deficit:  Goal: Optimize nutritional status  3/14/2023 0002 by Pablo Aranda RN  Outcome: Progressing  3/13/2023 1717 by Rolly Bashir RN  Outcome: Progressing     Problem: Skin/Tissue Integrity  Goal: Absence of new skin breakdown  Description: 1. Monitor for areas of redness and/or skin breakdown  2. Assess vascular access sites hourly  3. Every 4-6 hours minimum:  Change oxygen saturation probe site  4. Every 4-6 hours:  If on nasal continuous positive airway pressure, respiratory therapy assess nares and determine need for appliance change or resting period.   3/14/2023 0002 by Pablo Aranda RN  Outcome: Progressing  3/13/2023 1717 by Rolly Bashir RN  Outcome: Progressing     Problem:
Problem: Discharge Planning  Goal: Discharge to home or other facility with appropriate resources  3/16/2023 0126 by Divya Pinto RN  Outcome: Progressing  3/15/2023 2217 by Fred Barraza RN  Outcome: Progressing     Problem: Safety - Adult  Goal: Free from fall injury  3/16/2023 0126 by Divya Pinto RN  Outcome: Progressing  3/15/2023 2217 by Fred Barraza RN  Outcome: Progressing     Problem: ABCDS Injury Assessment  Goal: Absence of physical injury  3/16/2023 0126 by Divya Pinto RN  Outcome: Progressing  3/15/2023 2217 by Fred Barraza RN  Outcome: Progressing     Problem: Chronic Conditions and Co-morbidities  Goal: Patient's chronic conditions and co-morbidity symptoms are monitored and maintained or improved  3/15/2023 2217 by Fred Barraza RN  Outcome: Progressing     Problem: Nutrition Deficit:  Goal: Optimize nutritional status  3/15/2023 2217 by Fred Barraza RN  Outcome: Progressing     Problem: Skin/Tissue Integrity  Goal: Absence of new skin breakdown  Description: 1. Monitor for areas of redness and/or skin breakdown  2. Assess vascular access sites hourly  3. Every 4-6 hours minimum:  Change oxygen saturation probe site  4. Every 4-6 hours:  If on nasal continuous positive airway pressure, respiratory therapy assess nares and determine need for appliance change or resting period.   3/15/2023 2217 by Fred Barraza RN  Outcome: Progressing     Problem: Pain  Goal: Verbalizes/displays adequate comfort level or baseline comfort level  3/15/2023 2217 by Fred Barraza RN  Outcome: Progressing
Problem: Discharge Planning  Goal: Discharge to home or other facility with appropriate resources  3/19/2023 1448 by Elida Boggs RN  Outcome: Progressing     Problem: Safety - Adult  Goal: Free from fall injury  Outcome: Progressing     Problem: ABCDS Injury Assessment  Goal: Absence of physical injury  3/19/2023 1448 by Elida Boggs RN  Outcome: Progressing     Problem: Chronic Conditions and Co-morbidities  Goal: Patient's chronic conditions and co-morbidity symptoms are monitored and maintained or improved  3/19/2023 1448 by Elida Boggs RN  Outcome: Progressing     Problem: Nutrition Deficit:  Goal: Optimize nutritional status  Outcome: Progressing     Problem: Skin/Tissue Integrity  Goal: Absence of new skin breakdown  Description: 1. Monitor for areas of redness and/or skin breakdown  2. Assess vascular access sites hourly  3. Every 4-6 hours minimum:  Change oxygen saturation probe site  4. Every 4-6 hours:  If on nasal continuous positive airway pressure, respiratory therapy assess nares and determine need for appliance change or resting period.   Outcome: Progressing     Problem: Pain  Goal: Verbalizes/displays adequate comfort level or baseline comfort level  Outcome: Progressing     Problem: Respiratory - Adult  Goal: Achieves optimal ventilation and oxygenation  3/19/2023 1448 by Elida Boggs RN  Outcome: Progressing     Problem: Cardiovascular - Adult  Goal: Maintains optimal cardiac output and hemodynamic stability  3/19/2023 1448 by Elida Boggs RN  Outcome: Progressing     Problem: Skin/Tissue Integrity - Adult  Goal: Skin integrity remains intact  Outcome: Progressing  Flowsheets (Taken 3/19/2023 1037)  Skin Integrity Remains Intact: Monitor for areas of redness and/or skin breakdown     Problem: Musculoskeletal - Adult  Goal: Return mobility to safest level of function  3/19/2023 1448 by Elida Boggs RN  Outcome: Progressing
Problem: Discharge Planning  Goal: Discharge to home or other facility with appropriate resources  3/20/2023 2154 by Gordo Alvarado RN  Outcome: Progressing  3/20/2023 1053 by Jose Poole RN  Outcome: Progressing     Problem: Safety - Adult  Goal: Free from fall injury  3/20/2023 2154 by Gordo Alvarado RN  Outcome: Progressing  3/20/2023 1053 by Jose Poole RN  Outcome: Progressing     Problem: ABCDS Injury Assessment  Goal: Absence of physical injury  3/20/2023 2154 by Gordo Alvarado RN  Outcome: Progressing  3/20/2023 1053 by Jose Poole RN  Outcome: Progressing     Problem: Chronic Conditions and Co-morbidities  Goal: Patient's chronic conditions and co-morbidity symptoms are monitored and maintained or improved  3/20/2023 2154 by Gordo Alvarado RN  Outcome: Progressing  3/20/2023 1053 by Jose Poole RN  Outcome: Progressing     Problem: Nutrition Deficit:  Goal: Optimize nutritional status  3/20/2023 2154 by Gordo Alvarado RN  Outcome: Progressing  3/20/2023 1053 by Jose Poole RN  Outcome: Progressing  Flowsheets (Taken 3/20/2023 1038 by Елена Major, RD, LD)  Nutrient intake appropriate for improving, restoring, or maintaining nutritional needs: Monitor oral intake, labs, and treatment plans     Problem: Skin/Tissue Integrity  Goal: Absence of new skin breakdown  Description: 1. Monitor for areas of redness and/or skin breakdown  2. Assess vascular access sites hourly  3. Every 4-6 hours minimum:  Change oxygen saturation probe site  4. Every 4-6 hours:  If on nasal continuous positive airway pressure, respiratory therapy assess nares and determine need for appliance change or resting period.   3/20/2023 2154 by Gordo Alvarado RN  Outcome: Progressing  3/20/2023 1053 by Jose Poole RN  Outcome: Progressing     Problem: Pain  Goal: Verbalizes/displays adequate comfort level or baseline comfort level  3/20/2023 2154 by Gordo Alvarado RN  Outcome: Progressing  3/20/2023 1053 by
Problem: Discharge Planning  Goal: Discharge to home or other facility with appropriate resources  Outcome: Progressing     Problem: ABCDS Injury Assessment  Goal: Absence of physical injury  Outcome: Progressing     Problem: Chronic Conditions and Co-morbidities  Goal: Patient's chronic conditions and co-morbidity symptoms are monitored and maintained or improved  Outcome: Progressing     Problem: Respiratory - Adult  Goal: Achieves optimal ventilation and oxygenation  Outcome: Progressing     Problem: Cardiovascular - Adult  Goal: Maintains optimal cardiac output and hemodynamic stability  Outcome: Progressing     Problem: Musculoskeletal - Adult  Goal: Return mobility to safest level of function  Outcome: Progressing     Problem: Metabolic/Fluid and Electrolytes - Adult  Goal: Electrolytes maintained within normal limits  Outcome: Progressing     Problem: Metabolic/Fluid and Electrolytes - Adult  Goal: Hemodynamic stability and optimal renal function maintained  Outcome: Progressing
Problem: Discharge Planning  Goal: Discharge to home or other facility with appropriate resources  Outcome: Progressing     Problem: Safety - Adult  Goal: Free from fall injury  Outcome: Progressing     Problem: ABCDS Injury Assessment  Goal: Absence of physical injury  Outcome: Progressing     Problem: Chronic Conditions and Co-morbidities  Goal: Patient's chronic conditions and co-morbidity symptoms are monitored and maintained or improved  Outcome: Progressing
Problem: Discharge Planning  Goal: Discharge to home or other facility with appropriate resources  Outcome: Progressing     Problem: Safety - Adult  Goal: Free from fall injury  Outcome: Progressing     Problem: ABCDS Injury Assessment  Goal: Absence of physical injury  Outcome: Progressing     Problem: Chronic Conditions and Co-morbidities  Goal: Patient's chronic conditions and co-morbidity symptoms are monitored and maintained or improved  Outcome: Progressing     Problem: Nutrition Deficit:  Goal: Optimize nutritional status  3/10/2023 2220 by Libby Lunsford RN  Outcome: Progressing  3/10/2023 1128 by Juana Madden RD, LD  Outcome: Adequate for Discharge  Flowsheets (Taken 3/10/2023 1109)  Nutrient intake appropriate for improving, restoring, or maintaining nutritional needs: Monitor oral intake, labs, and treatment plans     Problem: Skin/Tissue Integrity  Goal: Absence of new skin breakdown  Description: 1. Monitor for areas of redness and/or skin breakdown  2. Assess vascular access sites hourly  3. Every 4-6 hours minimum:  Change oxygen saturation probe site  4. Every 4-6 hours:  If on nasal continuous positive airway pressure, respiratory therapy assess nares and determine need for appliance change or resting period.   Outcome: Progressing
Problem: Discharge Planning  Goal: Discharge to home or other facility with appropriate resources  Outcome: Progressing     Problem: Safety - Adult  Goal: Free from fall injury  Outcome: Progressing     Problem: ABCDS Injury Assessment  Goal: Absence of physical injury  Outcome: Progressing     Problem: Chronic Conditions and Co-morbidities  Goal: Patient's chronic conditions and co-morbidity symptoms are monitored and maintained or improved  Outcome: Progressing     Problem: Nutrition Deficit:  Goal: Optimize nutritional status  Outcome: Progressing
Problem: Discharge Planning  Goal: Discharge to home or other facility with appropriate resources  Outcome: Progressing     Problem: Safety - Adult  Goal: Free from fall injury  Outcome: Progressing     Problem: ABCDS Injury Assessment  Goal: Absence of physical injury  Outcome: Progressing     Problem: Chronic Conditions and Co-morbidities  Goal: Patient's chronic conditions and co-morbidity symptoms are monitored and maintained or improved  Outcome: Progressing     Problem: Nutrition Deficit:  Goal: Optimize nutritional status  Outcome: Progressing     Problem: Skin/Tissue Integrity  Goal: Absence of new skin breakdown  Description: 1. Monitor for areas of redness and/or skin breakdown  2. Assess vascular access sites hourly  3. Every 4-6 hours minimum:  Change oxygen saturation probe site  4. Every 4-6 hours:  If on nasal continuous positive airway pressure, respiratory therapy assess nares and determine need for appliance change or resting period.   Outcome: Progressing
Problem: Discharge Planning  Goal: Discharge to home or other facility with appropriate resources  Outcome: Progressing     Problem: Safety - Adult  Goal: Free from fall injury  Outcome: Progressing     Problem: ABCDS Injury Assessment  Goal: Absence of physical injury  Outcome: Progressing     Problem: Chronic Conditions and Co-morbidities  Goal: Patient's chronic conditions and co-morbidity symptoms are monitored and maintained or improved  Outcome: Progressing     Problem: Nutrition Deficit:  Goal: Optimize nutritional status  Outcome: Progressing     Problem: Skin/Tissue Integrity  Goal: Absence of new skin breakdown  Description: 1. Monitor for areas of redness and/or skin breakdown  2. Assess vascular access sites hourly  3. Every 4-6 hours minimum:  Change oxygen saturation probe site  4. Every 4-6 hours:  If on nasal continuous positive airway pressure, respiratory therapy assess nares and determine need for appliance change or resting period.   Outcome: Progressing
Problem: Discharge Planning  Goal: Discharge to home or other facility with appropriate resources  Outcome: Progressing     Problem: Safety - Adult  Goal: Free from fall injury  Outcome: Progressing     Problem: ABCDS Injury Assessment  Goal: Absence of physical injury  Outcome: Progressing     Problem: Chronic Conditions and Co-morbidities  Goal: Patient's chronic conditions and co-morbidity symptoms are monitored and maintained or improved  Outcome: Progressing     Problem: Nutrition Deficit:  Goal: Optimize nutritional status  Outcome: Progressing     Problem: Skin/Tissue Integrity  Goal: Absence of new skin breakdown  Description: 1. Monitor for areas of redness and/or skin breakdown  2. Assess vascular access sites hourly  3. Every 4-6 hours minimum:  Change oxygen saturation probe site  4. Every 4-6 hours:  If on nasal continuous positive airway pressure, respiratory therapy assess nares and determine need for appliance change or resting period.   Outcome: Progressing     Problem: Pain  Goal: Verbalizes/displays adequate comfort level or baseline comfort level  Outcome: Progressing
Problem: Discharge Planning  Goal: Discharge to home or other facility with appropriate resources  Outcome: Progressing  Flowsheets (Taken 3/14/2023 0800 by Tracy Sanderson, RN)  Discharge to home or other facility with appropriate resources: Identify barriers to discharge with patient and caregiver     Problem: Safety - Adult  Goal: Free from fall injury  Outcome: Progressing     Problem: ABCDS Injury Assessment  Goal: Absence of physical injury  Outcome: Progressing     Problem: Chronic Conditions and Co-morbidities  Goal: Patient's chronic conditions and co-morbidity symptoms are monitored and maintained or improved  Outcome: Progressing  Flowsheets (Taken 3/14/2023 0800 by Tracy Sanderson, RN)  Care Plan - Patient's Chronic Conditions and Co-Morbidity Symptoms are Monitored and Maintained or Improved: Monitor and assess patient's chronic conditions and comorbid symptoms for stability, deterioration, or improvement     Problem: Nutrition Deficit:  Goal: Optimize nutritional status  Outcome: Progressing     Problem: Skin/Tissue Integrity  Goal: Absence of new skin breakdown  Description: 1. Monitor for areas of redness and/or skin breakdown  2. Assess vascular access sites hourly  3. Every 4-6 hours minimum:  Change oxygen saturation probe site  4. Every 4-6 hours:  If on nasal continuous positive airway pressure, respiratory therapy assess nares and determine need for appliance change or resting period.   Outcome: Progressing     Problem: Pain  Goal: Verbalizes/displays adequate comfort level or baseline comfort level  Outcome: Progressing
Problem: Safety - Adult  Goal: Free from fall injury  3/13/2023 2350 by Harmeet Lin RN  Outcome: Progressing  Flowsheets (Taken 3/13/2023 2350)  Free From Fall Injury:   [place] Moment family/caregiver on patient safety   Based on caregiver fall risk screen, instruct family/caregiver to ask for assistance with transferring infant if caregiver noted to have fall risk factors     Problem: Pain  Goal: Verbalizes/displays adequate comfort level or baseline comfort level  Outcome: Progressing  Flowsheets (Taken 3/13/2023 2350)  Verbalizes/displays adequate comfort level or baseline comfort level:   Administer analgesics based on type and severity of pain and evaluate response   Encourage patient to monitor pain and request assistance
Problem: Safety - Adult  Goal: Free from fall injury  Outcome: Progressing     Problem: ABCDS Injury Assessment  Goal: Absence of physical injury  Outcome: Progressing     Problem: Chronic Conditions and Co-morbidities  Goal: Patient's chronic conditions and co-morbidity symptoms are monitored and maintained or improved  Outcome: Progressing     Problem: Nutrition Deficit:  Goal: Optimize nutritional status  Outcome: Progressing
period.   3/14/2023 2225 by Lisa Minaya RN  Outcome: Progressing  3/14/2023 1458 by Aroldo Ramirez RN  Outcome: Progressing     Problem: Pain  Goal: Verbalizes/displays adequate comfort level or baseline comfort level  3/14/2023 2225 by Lisa Minaya RN  Outcome: Progressing  3/14/2023 1458 by Aroldo Ramirez RN  Outcome: Progressing
ventilation and oxygenation  3/20/2023 1053 by Randy Harper RN  Outcome: Progressing  3/19/2023 2223 by Darnell Cruz RN  Outcome: Progressing  Flowsheets (Taken 3/19/2023 2223)  Achieves optimal ventilation and oxygenation: Oxygen supplementation based on oxygen saturation or arterial blood gases     Problem: Cardiovascular - Adult  Goal: Maintains optimal cardiac output and hemodynamic stability  3/20/2023 1053 by Randy Harper RN  Outcome: Progressing  3/19/2023 2223 by Darnell Cruz RN  Outcome: Progressing  Flowsheets (Taken 3/19/2023 2223)  Maintains optimal cardiac output and hemodynamic stability: Monitor urine output and notify Licensed Independent Practitioner for values outside of normal range  Goal: Absence of cardiac dysrhythmias or at baseline  Outcome: Progressing     Problem: Skin/Tissue Integrity - Adult  Goal: Skin integrity remains intact  Outcome: Progressing  Goal: Oral mucous membranes remain intact  Outcome: Progressing     Problem: Musculoskeletal - Adult  Goal: Return mobility to safest level of function  Outcome: Progressing  Goal: Maintain proper alignment of affected body part  Outcome: Progressing  Goal: Return ADL status to a safe level of function  Outcome: Progressing     Problem: Genitourinary - Adult  Goal: Absence of urinary retention  Outcome: Progressing     Problem: Metabolic/Fluid and Electrolytes - Adult  Goal: Electrolytes maintained within normal limits  Outcome: Progressing  Goal: Hemodynamic stability and optimal renal function maintained  Outcome: Progressing     Problem: Hematologic - Adult  Goal: Maintains hematologic stability  Outcome: Progressing
Progressing  3/20/2023 2154 by Yasemin Kearney RN  Outcome: Progressing     Problem: Hematologic - Adult  Goal: Maintains hematologic stability  3/21/2023 0841 by Robert Swenson RN  Outcome: Progressing  3/20/2023 2154 by Yasemin Kearney RN  Outcome: Progressing

## 2023-03-22 NOTE — PROGRESS NOTES
03/06/23 1114   RT Protocol   History Pulmonary Disease 0   Respiratory pattern 0   Breath sounds 0   Cough 0   Bronchodilator Assessment Score 0
03/14/23 0844   RT Protocol   History Pulmonary Disease 0   Respiratory pattern 0   Breath sounds 2   Cough 0   Bronchodilator Assessment Score 2
1350: [facility].
2022: Patient was D/C and transported to [facility] by [employer].
4 Eyes Skin Assessment     NAME:  Amando Cordova  YOB: 1933  MEDICAL RECORD NUMBER:  [id number]    The patient is being assessed for  Admission    I agree that One RN has performed a thorough Head to Toe Skin Assessment on the patient. ALL assessment sites listed below have been assessed. Areas assessed by both nurses:    Head, Face, Ears, Shoulders, Back, Chest, Arms, Elbows, Hands, Sacrum. Buttock, Coccyx, Ischium, and Legs. Feet and Heels        Does the Patient have a Wound?  No noted wound(s) Light Erythema to LLE       Malachi Prevention initiated by RN: Yes   Wound Care Orders initiated by RN: Yes    Pressure Injury (Stage 3,4, Unstageable, DTI, NWPT, and Complex wounds) if present, place referral order by RN under : No    New and Established Ostomies, if present place, referral order under : NA      Nurse 1 eSignature: Electronically signed by Mag Marc RN on 3/5/23 at 6:51 PM EST    **SHARE this note so that the co-signing nurse can place an eSignature**    Nurse 2 eSignature: Electronically signed by Yoko Mendez RN on 3/5/23 at 7:23 PM EST
4 Eyes Skin Assessment     NAME:  Kirby Celaya  YOB: 1933  MEDICAL RECORD NUMBER:  [id number]    The patient is being assessed for  Transfer to New Unit    I agree that One RN has performed a thorough Head to Toe Skin Assessment on the patient. ALL assessment sites listed below have been assessed. Areas assessed by both nurses:    Head, Face, Ears, Shoulders, Back, Chest, Arms, Elbows, Hands, Sacrum. Buttock, Coccyx, Ischium, Legs. Feet and Heels, and Other breast folds        Does the Patient have a Wound?  Other excoriation and redness of left ankle       Malachi Prevention initiated by RN: Yes   Wound Care Orders initiated by RN: No    Pressure Injury (Stage 3,4, Unstageable, DTI, NWPT, and Complex wounds) if present, place referral order by RN under : No    New and Established Ostomies, if present place, referral order under : No      Nurse 1 eSignature: Electronically signed by Kraig Escoto RN on 3/6/23 at 12:47 AM EST    **SHARE this note so that the co-signing nurse can place an eSignature**    Nurse 2 eSignature: Electronically signed by Peyman Everett RN on 3/6/23 at 11:31 PM EST
7954: Shift assessment completed, morning medications given per MAR. VSS, alert and oriented. Call light within reach. The care plan and education has been reviewed and mutually agreed upon with the patient.
[address]   Daily Progress Note      Admit Date:  3/5/2023    HPI:    Ms. Janice Hutton is an 80year old female with history of hypertension, PAF, chronic diastolic heart failure, chronic renal insufficiency and chronic \"micro aspiration\" issues. She follows at [facility] [place], operated by [facility] cardiology    She is admitted with shortness of breath, wheezing and mild LLE. BP severely uncontrolled and placed on nicardipine drip. She was placed on 4 L of oxygen. Admitted to [facility] 1/23 for hypertension urgency and covid      Subjective:  Patient is being seen for acute on chronic diastolic heart failure. There were no acute overnight cardiac events. BP continues to be very elevated. She is sitting up in the chair on 3 L. She is very anemic with Hgb of 8.7 and no iron studies or tsh in care everywhere. She follows asleep easily. She lives by her self and her 2 sons live nearby. Sodium 135 potassium 4.3 creat 1.5 bnp 6174->94084    Objective:   BP (!) 175/68   Pulse 73   Temp 97.4 °F (36.3 °C) (Temporal)   Resp 20   Ht 5' 3\" (1.6 m)   Wt 147 lb 11.3 oz (67 kg)   SpO2 94%   BMI 26.17 kg/m²     Intake/Output Summary (Last 24 hours) at 3/9/2023 1017  Last data filed at 3/9/2023 0805  Gross per 24 hour   Intake 680 ml   Output 950 ml   Net -270 ml          Physical Exam:  General:  Awake, alert, oriented in NAD, thin  Skin:  Warm and dry. No unusual bruising or rash  Neck:  Supple. No JVD or carotid bruit appreciated  Chest:  Normal effort.   Rales bilaterally in bases  Cardiovascular:  RRR, S1/S2, no murmur/gallop/rub  Abdomen:  Soft, nontender, +bowel sounds  Extremities:  No edema  Neurological: No focal deficits  Psychological: Normal mood and affect      Medications:    hydrALAZINE  50 mg Oral 3 times per day    guaiFENesin  600 mg Oral BID    insulin glargine  20 Units SubCUTAneous QAM AC    ipratropium-albuterol  1 ampule Inhalation Q4H WA    insulin lispro  0-16 Units SubCUTAneous TID WC    insulin lispro
CLINICAL PHARMACY NOTE: MEDS TO BEDS    Total # of Prescriptions Filled: 1   The following medications were delivered to the patient:  Azelastine 0.05% soln    Additional Documentation:     Medication was picked up in the Outpatient Pharmacy by  [facility]   
Called and gave report to Toni Tejeda. Pt is transferring  to them.
Check on pt; prefers to be up in chair and watching TV for now
[facility]   Daily Progress Note      Admit Date:  3/5/2023    HPI:    Ms. Janice Hutton is an 80year old female with history of hypertension, PAF, chronic diastolic heart failure, chronic renal insufficiency and chronic \"micro aspiration\" issues. She follows at [facility] cardiology    She is admitted with shortness of breath, wheezing and mild LLE. BP severely uncontrolled and placed on nicardipine drip. She was placed on 4 L of oxygen. Admitted to [facility] 1/23 for hypertension urgency and covid      Subjective:  Patient is being seen for acute on chronic diastolic heart failure. There were no acute overnight cardiac events. Very iron deficient on IV venofer. Lasix drip stopped by pulmonary and dose of metolazone given per hospitalist today. BP is better today. She is lying in the bed on 3 L of oxygen. Sodium 135-131 potassium 4.3 creat 1.5-1.6 bnp 6174->22524 and -4L out weight 142-147-151     Objective:   BP (!) 142/44   Pulse 66   Temp 96.9 °F (36.1 °C) (Temporal)   Resp 18   Ht 5' 3\" (1.6 m)   Wt 151 lb 14.4 oz (68.9 kg)   SpO2 92%   BMI 26.91 kg/m²     Intake/Output Summary (Last 24 hours) at 3/10/2023 0913  Last data filed at 3/10/2023 0035  Gross per 24 hour   Intake 639.67 ml   Output 700 ml   Net -60.33 ml          Physical Exam:  General:  Awake, alert, oriented in NAD, thin  Skin:  Warm and dry. No unusual bruising or rash  Neck:  Supple. No JVD or carotid bruit appreciated  Chest:  Normal effort.  Wheezing bilaterally  Cardiovascular:  RRR, S1/S2, no murmur/gallop/rub  Abdomen:  Soft, nontender, +bowel sounds  Extremities:  No edema  Neurological: No focal deficits  Psychological: Normal mood and affect      Medications:    metOLazone  2.5 mg Oral Once    hydrALAZINE  50 mg Oral 3 times per day    iron sucrose  200 mg IntraVENous Q24H    guaiFENesin  600 mg Oral BID    insulin glargine  20 Units SubCUTAneous QAM AC    ipratropium-albuterol  1 ampule Inhalation Q4H WA
[facility]  Speech Language Pathology   Dysphagia Treatment Note    Patient: Tyler Artis   : 1933   MRN: [id number]      Evaluation Date: 3/13/2023      Admitting Dx: Shortness of breath [R06.02]  Nausea [R11.0]  Acute pulmonary edema ([address] [J81.0]  Leg edema [R60.0]  Pleural effusion [J90]  Pleural effusion, bilateral [J90]  Diarrhea, unspecified type [R19.7]  Treatment Diagnosis: Oropharyngeal Dysphagia   Pain: Denies                                              Diet and Treatment Recommendations 3/13/2023:  Diet Solids Recommendation:  Regular texture diet  Liquid Consistency Recommendation: Thin liquids, No straws  Recommended form of Meds: Meds whole with water     Compensatory strategies:   Upright as possible with all PO intake , Small bites/sips , Eat/feed slowly, Remain upright 30-45 min , No straws    Assessment of Texture Tolerance:  Diet level prior to treatment: Regular texture diet, Thin liquids  Tolerance of Current Diet Level: Per chart, no noted difficulty with current diet level. RN reports pt frequently coughing. Impressions: Pt was positioned Upright in chair, awake and alert. Currently on  1L O2 via nasal cannula . Pt reports ongoing coughing and some wheezing this AM, states she has been coughing \"for five years\". Pt again presenting with baseline congested coughing prior to PO trials. Trials of thin liquids, soft solids, and regular solids were provided to assess swallow function. With set up assistance from SLP, pt able to self feed. Pt's oral phase characterized by prolonged mastication and prolonged AP transit. Congested coughing continues to be noted during PO intake. Pt with one instance of immediate coughing following drink of thin liquid via straw. Trial of thin liquids via cup edge, apparent improved tolerance. Pt demonstrates increased risk for aspiration due to respiratory status, weak cough, and prior hx of dysphagia.  Based on today's assessment
[facility]  Speech Language Pathology   Dysphagia Treatment Note    Patient: Supriya Lau   : 1933   MRN: [id number]      Evaluation Date: 3/17/2023      Admitting Dx: Shortness of breath [R06.02]  Nausea [R11.0]  Acute pulmonary edema ([address] [J81.0]  Leg edema [R60.0]  Pleural effusion [J90]  Pleural effusion, bilateral [J90]  Diarrhea, unspecified type [R19.7]  Treatment Diagnosis: Oropharyngeal Dysphagia   Pain: Did not state                                              Diet and Treatment Recommendations 3/17/2023:  Diet Solids Recommendation:  Easy to chew  Liquid Consistency Recommendation: Thin liquids  Recommended form of Meds: Meds whole with water or Meds in puree      Compensatory strategies:   Upright as possible with all PO intake , Small bites/sips , Eat/feed slowly, Remain upright 30-45 min      Assessment of Texture Tolerance:  Diet level prior to treatment: Regular texture diet , Thin liquids   Tolerance of Current Diet Level:Per chart, no noted difficulty with current diet level      Impressions: Pt was positioned Upright in chair, awake and alert. Currently on  2L O2 via nasal cannula . Pt initially declined PO trials but was agreeable to a limited amount following encouragement. Trials of thin liquids and regular solids  were provided to assess swallow function. Pt able to self feed independently. Pt continues to present with intermittent extensive coughing prior to PO administration and throughout trials; cough is reported as baseline and occurs throughout the day with or without PO. The pt demonstrates prolonged mastication of the limited trial, requiring multiple liquid washes to clear. Pt demonstrated delayed congested coughing following PO intake, difficult to determine at the bedside if d/t oral intake. Pt reports that she has difficulty chewing some of her meal items.  Following SLP's education to available diet textures, pt agreeable to diet downgrade to the
[facility]  Speech Language Pathology   Dysphagia Treatment Note    Patient: Morena Alvarez   : 1933   MRN: [id number]      Evaluation Date: 3/10/2023      Admitting Dx: Shortness of breath [R06.02]  Nausea [R11.0]  Acute pulmonary edema ([address] [J81.0]  Leg edema [R60.0]  Pleural effusion [J90]  Pleural effusion, bilateral [J90]  Diarrhea, unspecified type [R19.7]  Treatment Diagnosis: Oropharyngeal Dysphagia   Pain: Denies                                              Diet and Treatment Recommendations 3/10/2023:  Diet Solids Recommendation:  Regular texture diet  Liquid Consistency Recommendation: Thin liquids  Recommended form of Meds: Meds whole with water     Compensatory strategies:   Upright as possible with all PO intake , Small bites/sips , Eat/feed slowly, Remain upright 30-45 min     Assessment of Texture Tolerance:  Diet level prior to treatment: Regular texture diet , Thin liquids   Tolerance of Current Diet Level:Per chart, no noted difficulty with current diet level    Impressions: Pt was positioned Upright in chair, awake and alert. Daughter-in-law present in room. Currently on  3L O2 via nasal cannula . Pt reports the fish at lunch was a little tough to chew. Trials of thin liquids, soft solids, and regular solids were provided to assess swallow function. With set up assistance from SLP, pt able to self feed. Pt's oral phase characterized by functional bolus acceptance, slightly reduced AP transit, and slightly prolonged mastication. Pt with no oral residue noted post swallow given extended time. Pt with intermittent coughing noted before and throughout trials, pt reported this was her baseline and the coughing occurs consistently throughout the day. No additional overt clinical s/s of aspiration noted. Pt demonstrates increased risk for aspiration due to respiratory status, weak cough, and prior hx of dysphagia.  Based on today's assessment recommend Regular texture diet
[facility]  Speech Language Pathology   Dysphagia Treatment Note    Patient: Cecelia Preciado   : 1933   MRN: [id number]      Evaluation Date: 3/14/2023      Admitting Dx: Shortness of breath [R06.02]  Nausea [R11.0]  Acute pulmonary edema ([address] [J81.0]  Leg edema [R60.0]  Pleural effusion [J90]  Pleural effusion, bilateral [J90]  Diarrhea, unspecified type [R19.7]  Treatment Diagnosis: Oropharyngeal Dysphagia   Pain: Did not state                                              Diet and Treatment Recommendations 3/14/2023:  Diet Solids Recommendation:  Regular texture diet  Liquid Consistency Recommendation: Thin liquids  Recommended form of Meds: Meds whole with water     Compensatory strategies:   Upright as possible with all PO intake , Small bites/sips , Eat/feed slowly, Remain upright 30-45 min      Assessment of Texture Tolerance:  Diet level prior to treatment: Regular texture diet , Thin liquids   Tolerance of Current Diet Level:Per chart, no noted difficulty with current diet level  RN reported pt appears to be tolerating current diet level     Impressions: Pt was positioned Upright in chair, awake and alert. Currently on  3L O2 via nasal cannula . Trials of thin liquids and regular solids  were provided to assess swallow function. Pt able to self feed independently. Pt with intermittent cough prior to PO administration and throughout trials; cough is reported as baseline and occurs throughout the day with or without PO. Pt presenting with no anterior loss, decreased but appears functional oral prep and AP transit, decreased but appears functional mastication with regular solids, good oral clearance, delayed swallow initiation, no overt s/s associated with aspiration, no cough/ throat clear/ wet vocal quality noted. Pt demonstrates increased risk for aspiration due to respiratory status , weak cough , and prior hx of dysphagia .  Based on today's assessment recommend Regular texture
Hospice [facility]    Met with pt at bedside to discuss hospice services. Pt voiced that she still wants aggressive treatments and wants to return to the hospital if needed. Pt states she is getting home care services and wants to continue with those services. Hospice to continue following until discharge. Update provided to nurse Daisy Couch.     Thank you,    Antony Fields, RN  [phone]
Hospitalist Progress Note      PCP: El Mcmillan MD    Date of Admission: 3/5/2023    LOS: 6    Chief Complaint:   Chief Complaint   Patient presents with    Shortness of Breath     From home by [facility]. Started on Friday with nausea and woke up today with sob.  +cough x 7 years  left leg red and swollen x 2 weeks  given duo neb by mirella       Case Summary:   80-year-old lady with history of type 2 diabetes, hypertension, upper lipidemia, paroxysmal atrial fibrillation, chronic diastolic heart failure, CKD who presented with shortness of breath found to have acute on chronic diastolic heart failure complicated by pleural effusions and malignant hypertension. Active Hospital Problems    Diagnosis Date Noted    Hypertensive urgency [I16.0] 03/09/2023     Priority: Medium    Anemia [D64.9] 03/09/2023     Priority: Medium    Chronic atrial fibrillation ([address] [I48.20] 03/09/2023     Priority: Medium    Shortness of breath [R06.02] 03/08/2023     Priority: Medium    Acute on chronic diastolic heart failure ([address] [I50.33] 03/06/2023     Priority: Medium    Malignant hypertension [I10] 03/06/2023     Priority: Medium    Pleural effusion [J90] 03/05/2023     Priority: Medium    Paroxysmal atrial fibrillation ([address] [I48.0]      Feeling somewhat better today remains on 2 L O2. She required nicardipine drip for blood pressure and has since improved. Lasix on hold due to renal impairment probable overdiuresis with intravascular depletion  Echo with EF of 65% (3/6/2023)    Principal Problem:    Pleural effusion  Active Problems:    Acute on chronic diastolic heart failure (HCC)    Malignant hypertension    Shortness of breath    Hypertensive urgency    Anemia    Chronic atrial fibrillation (HCC)    Paroxysmal atrial fibrillation (HCC)  Resolved Problems:    * No resolved hospital problems.  *    -Continue holding Lasix  - Monitor renal function and electrolytes  - Continue on blood pressure medicine
Hospitalist Progress Note      PCP: Jose Barroso MD    Date of Admission: 3/5/2023    LOS: 11    Chief Complaint:   Chief Complaint   Patient presents with    Shortness of Breath     From home by [facility]. Started on Friday with nausea and woke up today with sob.  +cough x 7 years  left leg red and swollen x 2 weeks  given duo neb by mirella       Brief summary: This 43-year-old lady with a PMHx of hypertension, hyperlipidemia, PAF, HFpEF, type 2 diabetes, hyperlipidemia CKD presented shortness of breath, BLE edema and hypertensive urgency. CTPA negative for PE; showed mild bilateral pleural effusion and pulmonary edema. Interval history:  Pt seen and examined today. Overnight events noted, interval ancillary notes and labs reviewed. On 2 L nasal cannula satting around 92%. Procalcitonin repeat chest x-ray ordered  Sodium 135 this morning. Potassium 3.3; replacement ordered. Creatinine down to 1.8  Reported BLE swelling but denied any fever, cough, SOB, chest pain, bowel or bladder dysfunction   Plan for possible discharge tomorrow if medically stable      Assessment and plan    Acute on chronic diastolic heart failure/Pleural effusion:    Echocardiogram 3/6/2023 with EF of 65%. BNP 10K on admission; down to 6 K  CXR with pulm vascular congestion/pleural effusion. Cardiology on board; diuresed with Lasix and Samsca. -5 L since admission   Diuretic held due to worsening creatinine . Continue fluids and salt striction. Strict intake output and daily weights    NATHAN on CKD stage IV; likely secondary to diuresis  Creatinine 1.3 on admission; remained elevated around 1.8  Nephrology consulted; Lasix held  Avoid nephrotoxins, strict and output, daily weights and monitor renal failure closely    Hyponatremia likely 2/2 diuresis; serum sodium this morning  Status post Samsca. Monitor sodium closely    Hypertensive urgency: Improved   Initially required nifedipine drip on admission.   BP
Hospitalist Progress Note      PCP: Kristine Guy MD    Date of Admission: 3/5/2023    LOS: 15    Chief Complaint:   Chief Complaint   Patient presents with    Shortness of Breath     From home by [facility]. Started on Friday with nausea and woke up today with sob.  +cough x 7 years  left leg red and swollen x 2 weeks  given duo neb by mirella       Brief summary: This 68-year-old lady with a PMHx of hypertension, hyperlipidemia, PAF, HFpEF, type 2 diabetes, hyperlipidemia CKD presented shortness of breath, BLE edema and hypertensive urgency. CTPA negative for PE; showed mild bilateral pleural effusion and pulmonary edema. Interval history:  Pt seen and examined today. Overnight events noted, interval ancillary notes and labs reviewed. On 2 L nasal cannula satting around 96%; wean as tolerated keep sats above 92%  Afebrile overnight, WBC WNL, blood cultures negative to date  Low potassium this morning; replacement ordered  Diuresing with IV Lasix; -6.6 L since admission. proBNP down to 5, 732 creatinine stable around 1.7  Reported she is feeling better today; denied any fever, chills, chills, chest pain or SOB        Assessment and plan    Acute on chronic diastolic heart failure/Pleural effusion:    Echocardiogram 3/6/2023 with EF of 65%. BNP 10K on admission; down to 6,887  CXR with pulm vascular congestion/pleural effusion. Cardiology on board; diuresed with IV Lasix. Monitor electrolyte closely while on diuresis  Continue fluids and salt striction. Strict intake output and daily weights    NATHAN on CKD stage IV; likely cardiorenal  Creatinine 1.3 on admission; initially trended up to 1.9; now down to 1.7  Nephrology on board; continue IV diuresis given fluid overload  Avoid nephrotoxins, strict and output, daily weights and monitor renal failure closely    Hyponatremia: Likely secondary to HCTZ use  Status post Samsca. Monitor sodium closely.   Continue fluid restriction    UTI: Urine
Initial shift assessment completed, see complex assessment in doc flowsheet. Vss, afebrile, denies any pain at this time. Repositioned. Call light within reach, encouraged to call for nurse as needed throughout the shift. Monitoring BS, pt A/Ox4, mediations administrated per MAR.
Last night patient with a blood sugar of 73, orange juice was given. Rechecked blood sugar and it was 55.  2% milk given, blood sugar was rechecked and is now at 85. Messaged Dr. Jesús Castro in regard to patient's blood sugars. New order received.
[facility] [address] Department   Phone: [phone]    Occupational Therapy    [] Initial Evaluation            [x] Daily Treatment Note         [] Discharge Summary      Patient: Shilpa Bautista   : 1933   MRN: [id number]   Date of Service:  3/17/2023    Admitting Diagnosis:  Pleural effusion    Current Admission Summary: per H&P \"89 y.o. female who presents to the emergency department complaining of shortness of breath for several weeks. She was recently diagnosed with COVID-19 2 weeks ago and admitted to [facility] [place].  During this time, she reports left leg swelling that goes away when she elevates the left leg. Since Friday, she reports nausea and has had diarrhea for several days. She denies any acute pain. She states that her cough is chronic for years. She denies hemoptysis or chest pain. \"  Past Medical History:  has a past medical history of Arthritis, Diabetes mellitus ([address] Hyperlipidemia, Hypertension, and Paroxysmal atrial fibrillation ([address] Past Surgical History:  has a past surgical history that includes Cholecystectomy (). Discharge Recommendations: Shilpa Bautista scored a 16/24 on the AM-PAC ADL Inpatient form. Current research shows that an AM-PAC score of 17 or less is typically not associated with a discharge to the patient's home setting. Based on the patient's AM-PAC score and their current ADL deficits, it is recommended that the patient have 3-5 sessions per week of Occupational Therapy at d/c to increase the patient's independence. Please see assessment section for further patient specific details. If patient discharges prior to next session this note will serve as a discharge summary. Please see below for the latest assessment towards goals. Changed d/c recommendations this date due to patient continues to need assist with ADLs, has limited endurance and on 5L of O2 NC.  Patient lives alone and says does NOT have 24
[facility] [address]   Phone: [phone]    Physical Therapy                [x] Daily Treatment Note         [] Discharge Summary      Patient: Wesly Bloom   : 1933   MRN: [id number]   Date of Service:  3/13/2023  Admitting Diagnosis: Pleural effusion  Current Admission Summary: Wesly Bloom is a 80 y.o. female who presents to the emergency department complaining of shortness of breath for several weeks. She was recently diagnosed with COVID-19 2 weeks ago and admitted to [facility].  During this time, she reports left leg swelling that goes away when she elevates the left leg. Since Friday, she reports nausea and has had diarrhea for several days. She denies any acute pain. She states that her cough is chronic for years    Updated 3/13: acute on chronic diastolic heart failure, HTN, on 1L O2 via nasal cannula; palliative care consult    Past Medical History:  has a past medical history of Arthritis, Diabetes mellitus ([address] Hyperlipidemia, Hypertension, and Paroxysmal atrial fibrillation ([address] Past Surgical History:  has a past surgical history that includes Cholecystectomy (). Discharge Recommendations: Wesly Bloom scored a 18/24 on the AM-PAC short mobility form. Current research shows that an AM-PAC score of 18 or greater is typically associated with a discharge to the patient's home setting. Based on the patient's AM-PAC score and their current functional mobility deficits, it is recommended that the patient have 2-3 sessions per week of Physical Therapy at d/c to increase the patient's independence. At this time, this patient demonstrates the endurance and safety to discharge home with home services and a follow up treatment frequency of 2-3x/wk. Please see assessment section for further patient specific details.     PT recommends initial 24 hour (A) upon d/c    HOME HEALTH CARE: LEVEL 1 STANDARD    - Initial home health
[facility] [address]   Phone: [phone]    Physical Therapy                [x] Daily Treatment Note         [] Discharge Summary      Patient: Ector Patel   : 1933   MRN: [id number]   Date of Service:  3/20/2023  Admitting Diagnosis: Pleural effusion  Current Admission Summary: Ector Patel is a 80 y.o. female who presents to the emergency department complaining of shortness of breath for several weeks. She was recently diagnosed with COVID-19 2 weeks ago and admitted to [facility].  During this time, she reports left leg swelling that goes away when she elevates the left leg. Since Friday, she reports nausea and has had diarrhea for several days. She denies any acute pain. She states that her cough is chronic for years    Updated 3/13: acute on chronic diastolic heart failure, HTN, on 1L O2 via nasal cannula; palliative care consult    Past Medical History:  has a past medical history of Arthritis, Diabetes mellitus ([address] Hyperlipidemia, Hypertension, and Paroxysmal atrial fibrillation ([facility] [address] Past Surgical History:  has a past surgical history that includes Cholecystectomy (). Discharge Recommendations: Ector Patel scored a 15/24 on the AM-PAC short mobility form. Current research shows that an AM-PAC score of 17 or less is typically not associated with a discharge to the patient's home setting. Based on the patient's AM-PAC score and their current functional mobility deficits, it is recommended that the patient have 3-5 sessions per week of Physical Therapy at d/c to increase the patient's independence. Please see assessment section for further patient specific details. If patient discharges prior to next session this note will serve as a discharge summary. Please see below for the latest assessment towards goals.      DME Required For Discharge: no DME required at discharge - owns rollator/RW for each floor of home, has stair
[facility] [address] Department   Phone: [phone]    Physical Therapy    [] Initial Evaluation            [x] Daily Treatment Note         [] Discharge Summary      Patient: Ector Patel   : 1933   MRN: [id number]   Date of Service:  3/9/2023  Admitting Diagnosis: Pleural effusion  Current Admission Summary: Ector Patel is a 80 y.o. female who presents to the emergency department complaining of shortness of breath for several weeks. She was recently diagnosed with COVID-19 2 weeks ago and admitted to [facility].  During this time, she reports left leg swelling that goes away when she elevates the left leg. Since Friday, she reports nausea and has had diarrhea for several days. She denies any acute pain. She states that her cough is chronic for years  Past Medical History:  has a past medical history of Arthritis, Diabetes mellitus ([address] Hyperlipidemia, Hypertension, and Paroxysmal atrial fibrillation ([address] Past Surgical History:  has a past surgical history that includes Cholecystectomy (). Discharge Recommendations: Ector Patel scored a 19/24 on the AM-PAC short mobility form. Current research shows that an AM-PAC score of 18 or greater is typically associated with a discharge to the patient's home setting. Based on the patient's AM-PAC score and their current functional mobility deficits, it is recommended that the patient have 2-3 sessions per week of Physical Therapy at d/c to increase the patient's independence. At this time, this patient demonstrates the endurance and safety to discharge home with home services and a follow up treatment frequency of 2-3x/wk. Please see assessment section for further patient specific details.   HOME HEALTH CARE: LEVEL 1 STANDARD    - Initial home health evaluation to occur within 24-48 hours, in patient home   - Therapy to evaluate with goal of regaining prior level of functioning   - Therapy to
[facility] [address] Department   Phone: [phone]    Physical Therapy                [x] Daily Treatment Note  - Cotx        [] Discharge Summary      Patient: Amando Cordova   : 1933   MRN: [id number]   Date of Service:  3/15/2023  Admitting Diagnosis: Pleural effusion  Current Admission Summary: Amando Cordova is a 80 y.o. female who presents to the emergency department complaining of shortness of breath for several weeks. She was recently diagnosed with COVID-19 2 weeks ago and admitted to [facility].  During this time, she reports left leg swelling that goes away when she elevates the left leg. Since Friday, she reports nausea and has had diarrhea for several days. She denies any acute pain. She states that her cough is chronic for years    Updated 3/13: acute on chronic diastolic heart failure, HTN, on 1L O2 via nasal cannula; palliative care consult    Past Medical History:  has a past medical history of Arthritis, Diabetes mellitus ([address] Hyperlipidemia, Hypertension, and Paroxysmal atrial fibrillation ([facility] [address] Past Surgical History:  has a past surgical history that includes Cholecystectomy (). Discharge Recommendations: Amando Cordova scored a 17/24 on the AM-PAC short mobility form. Current research shows that an AM-PAC score of 17 or less is typically not associated with a discharge to the patient's home setting. Based on the patient's AM-PAC score and their current functional mobility deficits, it is recommended that the patient have 3-5 sessions per week of Physical Therapy at d/c to increase the patient's independence. Please see assessment section for further patient specific details. If patient discharges prior to next session this note will serve as a discharge summary. Please see below for the latest assessment towards goals.      DME Required For Discharge: no DME required at discharge - owns rollator/RW for each floor of home, has
[facility]   Cardiology Progress Note     Date: 3/19/2023  Admit Date: 3/5/2023     Reason for consultation: CHF, SOB    Chief Complaint:   Chief Complaint   Patient presents with    Shortness of Breath     From home by [facility]. Started on Friday with nausea and woke up today with sob.  +cough x 7 years  left leg red and swollen x 2 weeks  given duo neb by squad     History of Present Illness: History obtained from patient and medical record. Kinza Escobar is a 80 y.o. female with a past medical history of Htn, PAF, HFpEF, CKD     Pt admitted with HTN, SOB and LE edema. She has diuresed about 5Lwith IV Lasix, metolazone and samsca. Has also had Venofer for DRU    Interval Hx: Today, she is being seen for CHF follow up. She is resting in bed. She feels ok. She continues to have cough. She is diuresing fair. She remains in sinus rhythm with mildly elevated BP. Patient seen and examined. Clinical notes reviewed. Telemetry reviewed. No new complaints today. No major events overnight. Denies having chest pain, palpitations, orthopnea/PND, cough, or dizziness at the time of this visit. Allergies: Allergies   Allergen Reactions    Chicken Meat (Diagnostic)      Home Meds:  Prior to Visit Medications    Medication Sig Taking?  Authorizing Provider   metoprolol succinate (TOPROL XL) 50 MG extended release tablet Take 50 mg by mouth daily Yes Historical Provider, MD   NIFEdipine (ADALAT CC) 30 MG extended release tablet Take 30 mg by mouth daily Yes Historical Provider, MD   levothyroxine (SYNTHROID) 100 MCG tablet Take 100 mcg by mouth Daily Yes Historical Provider, MD   traZODone (DESYREL) 50 MG tablet Take 50 mg by mouth nightly Yes Historical Provider, MD   furosemide (LASIX) 20 MG tablet Take 40 mg by mouth 2 times daily Yes Historical Provider, MD   melatonin 3 MG TABS tablet Take 3 mg by mouth daily Yes Historical Provider, MD   cetirizine (ZYRTEC) 10 MG tablet Take 10 mg by mouth as
Nutrition Education    Provided heart failure diet education. Education included low sodium diet guidelines (3-4 gm Na+/day) and fluid restriction (64 oz/day). Reviewed foods to choose and foods to avoid, along with label reading and ways to add flavor to food. Pt does not currently follow a low sodium diet at home. Reported she does not add salt to foods but often consumes TV dinners. Pt states understanding of education. Time spent with patient: 10 minutes. Educated on 3/8  Learners: Patient  Readiness: Acceptance  Method: Explanation and Handout  Response: [facility] name and number provided.     Kalyn Carbone MS, RD, LD  Contact Number: [phone]
Nutrition Note    RECOMMENDATIONS  PO Diet: Added CCC modifier to current diet  ONS: Decreased ONS frequency to BID    NUTRITION ASSESSMENT   Pt triggered for positive nutrition screen indicating wt loss and poor po intake. Upon visiting, reported 4 to 5 lb unintentional wt loss within 2 to 3 weeks with decreased appetite during that same timeframe d/t not feeling well. No recent wt hx in EMR to accurately assess for any significant wt change. On low Na diet, receiving Glucerna TID; no documented intakes. Reported drank about 50% of ONS this morning, would like to keep receiving. RD will continue to monitor for adequate po intake. Nutrition Related Findings: -1.7L. Lytes obtained today WNL. HbA1c of 7.8% on 3/5. Glucose 186-232 yesterday; 173, 177 today. LBM 3/3, BS+. +2 non-pitting BLE edema. Wounds: None  Nutrition Education:  Education not indicated   Nutrition Goals: PO intake 50% or greater, by next RD assessment     MALNUTRITION ASSESSMENT   Acute Illness  Malnutrition Status: No malnutrition    NUTRITION DIAGNOSIS   Inadequate oral intake related to inadequate protein-energy intake as evidenced by poor intake prior to admission, weight loss    CURRENT NUTRITION THERAPIES  ADULT ORAL NUTRITION SUPPLEMENT; Breakfast, Lunch, Dinner; Diabetic Oral Supplement  ADULT DIET; Regular; Low Sodium (2 gm); 1500 ml     PO Intake: Unable to assess (no documented intakes)   PO Supplement Intake:Unable to assess    ANTHROPOMETRICS  Current Height: 5' 3\" (160 cm)  Current Weight: 142 lb 6.7 oz (64.6 kg)    Admission weight: 142 lb (64.4 kg)  Ideal Body Weight (IBW): 115 lbs  (52 kg)    Usual Bodyweight 138 lb (62.6 kg) (136-140 lb per pt report)       BMI: 25.2    COMPARATIVE STANDARDS  Energy (kcal):  3857-5731     Protein (g):         Fluid (mL/day):  9936-4397    The patient will be monitored per nutrition standards of care.  Consult dietitian if additional nutrition interventions are needed prior to RD
Nutrition Note    RECOMMENDATIONS  PO Diet: continue CCC-4, low Na+, 1500 mL fluid restriction  ONS: discontinue Glucerna        NUTRITION ASSESSMENT   Pt continues on a carb control, low Na+ diet with 1500 mL fluid restriction. Reviewed meal selection for compliancy and spoke with RN regarding fluid allowance at each meal.  Pt is eating 76 - 100% at each meal; will discontinue Glucerna. Nutrition Related Findings: -4 liters; edema: BUE nonpitting, RLE +1 nonpitting, LLE +2 nonpitting; lasix gtt  Wounds: None  Nutrition Education:  Education completed (3/8)   Nutrition Goals: PO intake 50% or greater, by next RD assessment     MALNUTRITION ASSESSMENT   Acute Illness  Malnutrition Status: No malnutrition      NUTRITION DIAGNOSIS   No nutrition diagnosis at this time related to inadequate protein-energy intake as evidenced by poor intake prior to admission, weight loss      CURRENT NUTRITION THERAPIES  ADULT DIET; Regular; 4 carb choices (60 gm/meal); Low Sodium (2 gm); 1500 ml     PO Intake: %   PO Supplement Intake:26-50%      ANTHROPOMETRICS  Current Height: 5' 3\" (160 cm)  Current Weight: 151 lb 14.4 oz (68.9 kg)    Admission weight: 142 lb (64.4 kg)  Ideal Body Weight (IBW): 115 lbs  (52 kg)    Usual Bodyweight 138 lb (62.6 kg) (136-140 lb per pt report)       BMI: 26.8      COMPARATIVE STANDARDS  Energy (kcal):  2995-9020     Protein (g):         Fluid (mL/day):  0210-5029    The patient will be monitored per nutrition standards of care. Consult dietitian if additional nutrition interventions are needed prior to RD reassessment.      Karo Hope RD, LD    Contact: [phone]
Nutrition Note    RECOMMENDATIONS  PO Diet: continue easy to chew, CCC-4, 2gm Na+, 1500 mL fluid restriction  ONS: Glucerna once per day      NUTRITION ASSESSMENT   Diet modified to easy to chew on 3/17 per SLP recommendations. Carb control, 2gm Na+ & 1500 ml fluid restriction modifiers remain. Pt reports she is eating at each meal but does not have much of an appetite. PO intake recorded as 26 - 75%. Encouraged intake at 50% or greater, will resume Glucerna but only provide once per day. Nutrition Related Findings: I/O's -7.6; Na+ 136; +1 BLE edema  Wounds: None  Nutrition Education:  Education not indicated   Nutrition Goals: PO intake 50% or greater     MALNUTRITION ASSESSMENT   Acute Illness  Malnutrition Status: No malnutrition      NUTRITION DIAGNOSIS   Inadequate oral intake related to inadequate protein-energy intake as evidenced by intake 26-50%, intake 51-75%      CURRENT NUTRITION THERAPIES  ADULT DIET; Easy to Chew; 4 carb choices (60 gm/meal); Low Sodium (2 gm); 1500 ml     PO Intake: 26-50%, 51-75%   PO Supplement Intake:None Ordered      ANTHROPOMETRICS  Current Height: 5' 3\" (160 cm)  Current Weight: 144 lb 6.4 oz (65.5 kg)    Admission weight: 142 lb (64.4 kg)  Ideal Body Weight (IBW): 115 lbs  (52 kg)    Usual Bodyweight 138 lb (62.6 kg) (136-140 lb per pt report)       BMI: 25.5      COMPARATIVE STANDARDS  Energy (kcal):  1300 - 1625     Protein (g):  68 - 104       Fluid (mL/day):  1300 - 1625    The patient will be monitored per nutrition standards of care. Consult dietitian if additional nutrition interventions are needed prior to RD reassessment.      Penny Lou RD, LD    Contact: [phone]
PM assessment complete and documented. Meds given per MAR. Pt currently sitting up in chair. Denies needs at present. Call light within reach. Will continue to monitor.
Patient resting in bed. Safety precautions in place. Patient denies further needs.
Pharmacy Home Medication Reconciliation Note    A medication reconciliation has been completed for Dejah Lomas 11/14/1933    Pharm[facility] provided by: Patient    The patient's home medication list is as follows: No current facility-administered medications on file prior to encounter. Current Outpatient Medications on File Prior to Encounter   Medication Sig Dispense Refill    levothyroxine (SYNTHROID) 100 MCG tablet Take 100 mcg by mouth Daily      traZODone (DESYREL) 50 MG tablet Take 50 mg by mouth nightly      furosemide (LASIX) 20 MG tablet Take 20 mg by mouth 2 times daily      melatonin 3 MG TABS tablet Take 3 mg by mouth daily      cetirizine (ZYRTEC) 10 MG tablet Take 10 mg by mouth as needed for Allergies      metFORMIN (GLUCOPHAGE) 500 MG tablet Take 500 mg by mouth daily (with breakfast) (Patient not taking: Reported on 3/5/2023)      aspirin 81 MG EC tablet Take 81 mg by mouth daily      amiodarone (CORDARONE) 200 MG tablet Take 200 mg by mouth 2 times daily      apixaban (ELIQUIS) 2.5 MG TABS tablet Take 5 mg by mouth 2 times daily Patient does not know dose       linagliptin (TRADJENTA) 5 MG tablet Take 5 mg by mouth daily      canagliflozin (INVOKANA) 100 MG TABS tablet Take 100 mg by mouth 2 times daily      atorvastatin (LIPITOR) 40 MG tablet Take 40 mg by mouth daily      amLODIPine (NORVASC) 5 MG tablet Take 5 mg by mouth daily (Patient not taking: Reported on 3/5/2023)      citalopram (CELEXA) 10 MG tablet Take 10 mg by mouth daily (Patient not taking: Reported on 3/5/2023)      azelastine (OPTIVAR) 0.05 % ophthalmic solution 1 drop 2 times daily      latanoprost (XALATAN) 0.005 % ophthalmic solution Place 1 drop into both eyes nightly      [DISCONTINUED] UNKNOWN TO PATIENT A little orange pill for thyroid         Patient is no longer taking Amlodipine 5 Mg Tablet, Citalopram 10 Mg Tablet, and Metformin 500 Mg Tablet.     Timing of last doses
Physical Therapy  Dejah Lomas    Attempted to see pt for PT treatment. Patient asleep upon arrival. Pt declined therapy at this time requesting to be allowed to rest. Will follow up with pt later in pm as schedule permits.  Thanks, Luis Angel Mirza, [address] [facility], DPT [id number]
Pt admitted to 3376,  Alert and oriented b/p elevated in /77, 10 mg hydralazine given prior to transfer to the unit  @1603  B/P remain elevated 218/75 , pt do not have any schedule med, Message sent to Dr Reta Carrel  Await new orders. Pt denies pain, on 2L N/C  Pt with dry cough, pt says cough is chronic  See flowsheets for full head to toe assessment    Pt oriented to room, dinner served, call light within reach, pt denies any need at this time, will continue to monitor.
Pt awake in bed watching tv. BP elevated and PRN hydralazine given. Pt denies pain or any other needs. Call light in reach and bed alarm engaged.
Pt awake, repositioned in bed
Pt having coughing spasms. When approaching her, noted audible wheezing.  Called RRT for breathing treatment
Pt is alert and oriented, pt took medications she had coughing spell after drinking sips of water, pt noted she drinks better with straws, educated pt that straws are not allowed, pt stated yes they told me when using straws it can go down the wrong pipe causing her to aspirate, edema noted in bilateral lower extremities. BP was somewhat elevated pt got routine bp meds. Call light within reach will continue to monitor.
Pt noted with blood glucose 66 @ PM check. Dinner tray was available immediately after checking sugar. Rechecked sugar after supper, now 98.
Pt sleeping between care. Noticed NC out of pts nose with sats 93%. Labs drawn per order. NC replaced once pt woke up. Purwick secure and draining without difficulty. No needs or concerns expressed. Will continue to monitor.
Pt transferred to CVU; easton Galvan No informed of transfer per pt request.     Pt transported with personal belongings; admission complete. Hand-off given to [place] at bedside; no questions at this time. Pt understands care & treatment plan.
Reassessment documented. No changes noted in care. Pt sleeping between care. + cough. Medicated with robitussin. Expiratory wheezes audible. Respiratory called for treatment. No other  needs or concerns expressed. Will continue to monitor.
Rec'd pt into my care
Reviewed notes, patient is wanting to continue to pursue therapy not hospice. Thank you for allowing [facility] to be a part of the  [address] [place] [address] Drive family's care. DOUG Stroud RN  [facility] [email or web address]  Cell: [phone]  Main: [phone]
[facility]  HEART FAILURE  Progress Note      Admit Date 3/5/2023     Reason for Consult:      Reason for Consultation/Chief Complaint: SOB    HPI:    Mikhail Fearing is a 80 y.o. female with PMH Htn, PAF, HFpEF, CKD admitted with HTN, SOB and LE edema. She has diuresed about 5Lwith IV Lasix, metolazone and samsca. Has also had Venofer for DRU      Subjective:  Patient is being seen for CHF. There were no acute overnight cardiac events. Today Ms. Maximo Solis c/o increased SOB today but denies chest pain,  palpitations, or dizziness. Review of Systems - History obtained from the patient  General ROS: negative  Respiratory ROS:SOB  Cardiovascular ROS: negative  Gastrointestinal ROS: no abdominal pain, change in bowel habits, or black or bloody stools  Musculoskeletal ROS: swelling  Neurological ROS: no TIA or stroke symptoms     Baseline Weight: 150   Wt Readings from Last 3 Encounters:   03/17/23 148 lb 13 oz (67.5 kg)   11/25/20 148 lb (67.1 kg)   08/25/20 148 lb (67.1 kg)         Cardiac Testing:   CTPA 3/5/2023  Negative CTA for pulmonary embolus. Mild bilateral pleural effusions with minimal bibasilar atelectasis with thickening of the interlobular septa compatible with pulmonary edema.       ECHO 3/6/2023   *Left ventricle - normal size, thickness and function EF of 65%   *Aortic valve - thickened and calcified, mild stenosis with MG 14mmHg, GLENDA   1.37cm2, trivial regurgitation   *Mitral valve - mild thickening, trivial regurgitation   *Tricuspid valve - moderate regurgitation with PASP of 48mmHg   *Pericardium - trivial effusion    NYHA Class III    Objective:   BP (!) 173/49   Pulse 68   Temp 97.8 °F (36.6 °C) (Temporal)   Resp 20   Ht 5' 3\" (1.6 m)   Wt 148 lb 13 oz (67.5 kg)   SpO2 93%   BMI 26.36 kg/m²     Intake/Output Summary (Last 24 hours) at 3/17/2023 0910  Last data filed at 3/17/2023 0400  Gross per 24 hour   Intake 240 ml   Output 300 ml   Net -60 ml      In: 240
Shift assessment completed, see flowsheets. Medications administered, see MAR. Vital signs stable, SPO2 96% on 2L. Patient denies pain. Plan of care discussed with patient. Fall precautions in place. Call light and bedside table within reach. Nonskid footwear on. Pt denies further needs at this time. Will continue to monitor.   Tyron Carvajal RN
Shift change, bedside report given to  Loan Simmons. Pt exhibits no s/s of distress. Call light in reach. Care has been transferred at this time.
Shift summary:    Pt son Sinan Flores updated with plan of discharge from . He verbalized understanding. Pt states she is feeling much better. Pt has been up to chair this shift. BS stable and BP stable at present. Pt without c/o SOB at present.
[facility]   Daily Progress Note      Admit Date:  3/5/2023    HPI:    Ms. Adryan Stringer is an 80year old female with history of hypertension, PAF, chronic diastolic heart failure, chronic renal insufficiency and chronic \"micro aspiration\" issues. She follows at [facility] cardiology    She is admitted with shortness of breath, wheezing and mild LLE. BP severely uncontrolled and placed on nicardipine drip. She was placed on 4 L of oxygen. Admitted to [facility] 1/23 for hypertension urgency and covid  Iron deficient on IV venofer      Subjective:  Patient is being seen for acute on chronic diastolic heart failure. There were no acute overnight cardiac events. She is breathing better still on 1 L of oxygen. She is planning on going to rehab at discharge. She is having black stool on oral iron. Sodium [phone] potassium 3.5 creat 1.5-1.6 bnp 6174->33545- -5732and -3.8L out weight 142-155-144 and -6 L out    Objective:   BP (!) 155/51   Pulse 66   Temp 98.1 °F (36.7 °C) (Oral)   Resp 17   Ht 5' 3\" (1.6 m)   Wt 144 lb 6.4 oz (65.5 kg)   SpO2 91%   BMI 25.58 kg/m²     Intake/Output Summary (Last 24 hours) at 3/20/2023 1139  Last data filed at 3/20/2023 0429  Gross per 24 hour   Intake 578.37 ml   Output 900 ml   Net -321.63 ml          Physical Exam:  General:  Awake, alert, oriented in NAD, thin  Skin:  Warm and dry. No unusual bruising or rash  Neck:  Supple. No JVD or carotid bruit appreciated  Chest:  Normal effort.  No rales, rhonchi or wheezing  Cardiovascular:  RRR, S1/S2, no murmur/gallop/rub  Abdomen:  Soft, nontender, +bowel sounds  Extremities:  bilateral ankle edema only  Neurological: No focal deficits  Psychological: Normal mood and affect      Medications:    hydrALAZINE  50 mg Oral 3 times per day    NIFEdipine  60 mg Oral BID    furosemide  40 mg IntraVENous TID    insulin lispro  0-4 Units SubCUTAneous TID WC    ferrous sulfate  325 mg Oral BID     [Held
The [place] Sleepiness Scale       The [place] Sleepiness Scale is widely used in the field of sleep medicine as a subjective measure of a patient's sleepiness. The test is a list of eight situations in which you rate your tendency to become sleepy on a scale of 0, no chance to 3, high chance of dozing. Your score is based on a scale of 0 to 24. The scale estimates whether you are experiencing excessive sleepiness that possibly requires medical attention. How Sleepy Are You? How sleepy are you to doze off or fall asleep in the following situations? You should rate your chances of dozing off, not just feeling tired. Even if you have not done some of these things recently try to determine how they would have affected you.  For each situation, decide whether or not you would have:     0 = No chance of dozing 1 = Slight chance of dozing   2 = Moderate chance of  dozing 3 = High change of dozing       Situation                                                                                     Chance of Dozing    Sitting and reading  0 =  []  1 =    [] 2 =    [] 3 =    [x]    Watching TV  0 =  []  1 =    [x] 2 =    [] 3 =    []      Sitting inactive in public place (e.g., a theater or a meeting)  0 =  [x]  1 =    [] 2 =    [] 3 =    []    As a passenger in a car for an hour without a break          0  =  [x]  1 =    [] 2 =    [] 3 =    []    Lying down to rest in the afternoon when circumstances permit    0 =  []  1 =    [x] 2 =    [] 3 =    []    Sitting and talking to someone  0 =  [x]  1 =    [] 2 =    [] 3 =    []      Sitting quietly after a lunch without alcohol  0 =  [x]  1 =    [] 2 =    [] 3 =    []    In a car, while stopped for a few minutes in traffic                                                                      0 =  [x]  1 =    [] 2 =    [] 3 =    []    Total Score = 5    If your total score is 10 or greater, you are experiencing excessive sleepiness and should consider seeking a medical
[facility]   Nephrology progress Note  [phone]   [facility]         Reason for Consult:  NATHAN    HISTORY OF PRESENT ILLNESS:      The patient is a 80 y. o.female with significant past medical history of CKD, HTN, DM II, atrial fibrillation on AC, dCHF came in on 3/5 with complaints of shortness of breath and lower extremity edema. She was found to be hypoxic in ED  She was also noted to have hypertensive urgency with blood pressures up to systolics of 934. Chest x-ray showed cardiomegaly and pulmonary vascular congestion  She also had a CT of the chest that did not show pulmonary embolus but showed mild bilateral pleural effusions with minimal bibasilar atelectasis with thickening of the interlobular septa compatible with pulmonary edema  She had an echocardiogram that showed LVEF of 65%, moderate TR  Admitted for hypertensive urgency requiring nicardipine gtt. and decompensated CHF to CVICU and was diuresed with IV furosemide  We are consulted for NATHAN  She follows with Dr. Vinay Haddad for CKD  CKD presumed to be multifactorial secondary to diabetes and hypertension  Baseline creatinine appears to be around mid 1s  Her creatinine on admission was 1.3 and has gotten worse to 1.8 at the time of consult, stable at this time  She was diuresed with furosemide and metolazone  She has also been hyponatremic with sodium ranging from 128-135, received tolvaptan during the hospital course       Interval history: feels well this morning. Slightly hypokalemic this AM. BP high. PHYSICAL EXAM:    Vitals:    BP (!) 181/55   Pulse 76   Temp 98.9 °F (37.2 °C) (Oral)   Resp 20   Ht 5' 3\" (1.6 m)   Wt 142 lb 4.8 oz (64.5 kg)   SpO2 91%   BMI 25.21 kg/m²   I/O last 3 completed shifts: In: 240 [P.O.:240]  Out: 2200 [Urine:2200]  I/O this shift:   In: 76 [P.O.:75]  Out: -     Physical Exam:  Gen:  alert, oriented x 3  PERRL , EOM +  Pallor +, No icterus  JVP not raised   CV: RRR no
[facility]   Nephrology progress Note  [phone]   [facility] [place]         Reason for Consult:  NATHAN    HISTORY OF PRESENT ILLNESS:      The patient is a 80 y. o.female with significant past medical history of CKD, HTN, DM II, atrial fibrillation on AC, dCHF came in on 3/5 with complaints of shortness of breath and lower extremity edema. She was found to be hypoxic in ED  She was also noted to have hypertensive urgency with blood pressures up to systolics of 727. Chest x-ray showed cardiomegaly and pulmonary vascular congestion  She also had a CT of the chest that did not show pulmonary embolus but showed mild bilateral pleural effusions with minimal bibasilar atelectasis with thickening of the interlobular septa compatible with pulmonary edema  She had an echocardiogram that showed LVEF of 65%, moderate TR  Admitted for hypertensive urgency requiring nicardipine gtt. and decompensated CHF to CVICU and was diuresed with IV furosemide  We are consulted for NATHAN  She follows with Dr. Leesa Claros for CKD  CKD presumed to be multifactorial secondary to diabetes and hypertension  Baseline creatinine appears to be around mid 1s  Her creatinine on admission was 1.3 and has gotten worse to 1.8 at the time of consult, stable at this time  She was diuresed with furosemide and metolazone  She has also been hyponatremic with sodium ranging from 128-135, received tolvaptan during the hospital course       Interval history: feels okay this morning. But reports breathing is still not completely better. Son at bedside. Diuresing well. PHYSICAL EXAM:    Vitals:    BP (!) 155/51   Pulse 66   Temp 98.1 °F (36.7 °C) (Oral)   Resp 17   Ht 5' 3\" (1.6 m)   Wt 144 lb 6.4 oz (65.5 kg)   SpO2 91%   BMI 25.58 kg/m²   I/O last 3 completed shifts: In: 1258.4 [P.O.:1140; I.V.:20; IV Piggyback:98.4]  Out: 2600 [Urine:2600]  No intake/output data recorded.     Physical Exam:  Gen:  alert, oriented x
Unable to collect stool samples this shift; pt did not have a BM
3/15/2023 0858  Last data filed at 3/15/2023 7757  Gross per 24 hour   Intake 400 ml   Output 1150 ml   Net -750 ml      In: 508.7 [P.O.:400; I.V.:5]  Out: 1370       Physical Exam:  General Appearance:  Non-obese/Well Nourished  Respiratory:  Resp Auscultation: wheezes  Cardiovascular:   Auscultation: Regular rate and rhythm, normal S1S2, +murmur  Palpation: Normal    Pedal Pulses: 2+ and equal   Abdomen:  Soft, NT, ND, + bs  Extremities:  No Cyanosis or Clubbing  Extremities: 1+ edema  Neurological/Psychiatric:  Oriented to time, place, and person  Non-anxious    Pertinent labs, diagnostic, device, and imaging results reviewed as a part of this visit    MEDICATIONS:   Scheduled Meds:   Scheduled Meds:   insulin glargine  5 Units SubCUTAneous QAM AC    metoprolol succinate  25 mg Oral Daily    hydrALAZINE  75 mg Oral 3 times per day    guaiFENesin  600 mg Oral BID    insulin lispro  0-16 Units SubCUTAneous TID WC    insulin lispro  0-4 Units SubCUTAneous Nightly    NIFEdipine  30 mg Oral BID    azelastine  1 drop Both Eyes BID    latanoprost  1 drop Both Eyes Nightly    levothyroxine  100 mcg Oral QAM AC    traZODone  50 mg Oral Nightly    amiodarone  200 mg Oral Daily    apixaban  2.5 mg Oral BID    atorvastatin  40 mg Oral Nightly    aspirin  81 mg Oral Daily    alogliptin  6.25 mg Oral Daily    sodium chloride flush  5-40 mL IntraVENous 2 times per day     Continuous Infusions:   dextrose      sodium chloride       PRN Meds:.sodium chloride, guaiFENesin-dextromethorphan, hydrALAZINE, albuterol sulfate HFA, cetirizine, dextrose bolus **OR** dextrose bolus, glucagon (rDNA), dextrose, sodium chloride flush, sodium chloride, ondansetron **OR** ondansetron, polyethylene glycol, acetaminophen **OR** acetaminophen  Continuous Infusions:   dextrose      sodium chloride         Intake/Output Summary (Last 24 hours) at 3/15/2023 0858  Last data filed at 3/15/2023 0625  Gross per 24 hour   Intake 400 ml   Output 1150 ml
BID    guaiFENesin  600 mg Oral BID    insulin lispro  0-16 Units SubCUTAneous TID WC    insulin lispro  0-4 Units SubCUTAneous Nightly    NIFEdipine  30 mg Oral BID    azelastine  1 drop Both Eyes BID    latanoprost  1 drop Both Eyes Nightly    levothyroxine  100 mcg Oral QAM AC    traZODone  50 mg Oral Nightly    amiodarone  200 mg Oral Daily    apixaban  2.5 mg Oral BID    atorvastatin  40 mg Oral Nightly    aspirin  81 mg Oral Daily    alogliptin  6.25 mg Oral Daily    sodium chloride flush  5-40 mL IntraVENous 2 times per day      dextrose      sodium chloride         Lab Data:  CBC:   Recent Labs     03/14/23  0252   WBC 8.7   HGB 9.0*        BMP:    Recent Labs     03/12/23  0809 03/13/23  0500 03/14/23  0252   * 128* 131*   K 4.3 4.7 3.7   CO2 29 28 28   BUN 38* 40* 41*   CREATININE 1.5* 1.6* 1.7*     INR:  No results for input(s): INR in the last 72 hours. BNP:    Recent Labs     03/13/23  0500   PROBNP 7,655*         Diagnostics:  CTPA 3/5/2023  Negative CTA for pulmonary embolus. Mild bilateral pleural effusions with minimal bibasilar atelectasis with thickening of the interlobular septa compatible with pulmonary edema. ECHO 3/6/2023   *Left ventricle - normal size, thickness and function EF of 65%   *Aortic valve - thickened and calcified, mild stenosis with MG 14mmHg, GLENDA   1.37cm2, trivial regurgitation   *Mitral valve - mild thickening, trivial regurgitation   *Tricuspid valve - moderate regurgitation with PASP of 48mmHg   *Pericardium - trivial effusion    Assessment:    1. Hypertension urgency  2. Dyspnea  3. Acute on chronic diastolic heart failure, fluid overload  4. Anemia, iron deficient  5. Chronic atrial fib on eliquis  6. Hyponatremia      Plan:    Continue IV venofer 200 mg x 5 doses completed  CHF nurse following for diet education, fluid restriction and daily weights  Continue toprol 25 mg daily to allow for higher HR  Continue hydralazine 75 mg every 8 hours  5.
Past Surgical History:    has a past surgical history that includes Cholecystectomy (1990). Social History:  Reviewed. reports that she has never smoked. She has never used smokeless tobacco. She reports that she does not drink alcohol and does not use drugs. Allergies: Allergies   Allergen Reactions    Chicken Meat (Diagnostic)        Family History:  Reviewed. Family history is unknown by patient. Denies family history of sudden cardiac death, arrhythmia, premature CAD    Home Meds:  Prior to Visit Medications    Medication Sig Taking?  Authorizing Provider   metoprolol succinate (TOPROL XL) 50 MG extended release tablet Take 50 mg by mouth daily Yes Historical Provider, MD   NIFEdipine (ADALAT CC) 30 MG extended release tablet Take 30 mg by mouth daily Yes Historical Provider, MD   levothyroxine (SYNTHROID) 100 MCG tablet Take 100 mcg by mouth Daily Yes Historical Provider, MD   traZODone (DESYREL) 50 MG tablet Take 50 mg by mouth nightly Yes Historical Provider, MD   furosemide (LASIX) 20 MG tablet Take 40 mg by mouth 2 times daily Yes Historical Provider, MD   melatonin 3 MG TABS tablet Take 3 mg by mouth daily Yes Historical Provider, MD   cetirizine (ZYRTEC) 10 MG tablet Take 10 mg by mouth as needed for Allergies Yes Historical Provider, MD   albuterol sulfate HFA (PROVENTIL;VENTOLIN;PROAIR) 108 (90 Base) MCG/ACT inhaler Inhale 2 puffs into the lungs Yes Historical Provider, MD   metFORMIN (GLUCOPHAGE) 500 MG tablet Take 500 mg by mouth daily (with breakfast)  Patient not taking: Reported on 3/5/2023  Historical Provider, MD   aspirin 81 MG EC tablet Take 81 mg by mouth daily  Historical Provider, MD   amiodarone (CORDARONE) 200 MG tablet Take 200 mg by mouth 2 times daily  Historical Provider, MD   apixaban (ELIQUIS) 2.5 MG TABS tablet Take 5 mg by mouth 2 times daily Patient does not know dose   Historical Provider, MD   linagliptin (TRADJENTA) 5 MG tablet Take 5 mg by mouth daily  Historical
Reported on 3/5/2023)      aspirin 81 MG EC tablet Take 81 mg by mouth daily      amiodarone (CORDARONE) 200 MG tablet Take 200 mg by mouth 2 times daily      apixaban (ELIQUIS) 2.5 MG TABS tablet Take 5 mg by mouth 2 times daily Patient does not know dose       linagliptin (TRADJENTA) 5 MG tablet Take 5 mg by mouth daily      canagliflozin (INVOKANA) 100 MG TABS tablet Take 100 mg by mouth 2 times daily      atorvastatin (LIPITOR) 40 MG tablet Take 40 mg by mouth daily      citalopram (CELEXA) 10 MG tablet Take 10 mg by mouth daily (Patient not taking: Reported on 3/5/2023)      azelastine (OPTIVAR) 0.05 % ophthalmic solution 1 drop 2 times daily      latanoprost (XALATAN) 0.005 % ophthalmic solution Place 1 drop into both eyes nightly          hydrALAZINE  50 mg Oral 3 times per day    iron sucrose  200 mg IntraVENous Q24H    guaiFENesin  600 mg Oral BID    insulin glargine  20 Units SubCUTAneous QAM AC    ipratropium-albuterol  1 ampule Inhalation Q4H WA    insulin lispro  0-16 Units SubCUTAneous TID WC    insulin lispro  0-4 Units SubCUTAneous Nightly    NIFEdipine  30 mg Oral BID    azelastine  1 drop Both Eyes BID    latanoprost  1 drop Both Eyes Nightly    levothyroxine  100 mcg Oral QAM AC    traZODone  50 mg Oral Nightly    metoprolol succinate  50 mg Oral Daily    amiodarone  200 mg Oral Daily    apixaban  2.5 mg Oral BID    atorvastatin  40 mg Oral Nightly    aspirin  81 mg Oral Daily    alogliptin  6.25 mg Oral Daily    sodium chloride flush  5-40 mL IntraVENous 2 times per day      dextrose      sodium chloride       sodium chloride, guaiFENesin-dextromethorphan, hydrALAZINE, albuterol sulfate HFA, cetirizine, dextrose bolus **OR** dextrose bolus, glucagon (rDNA), dextrose, sodium chloride flush, sodium chloride, ondansetron **OR** ondansetron, polyethylene glycol, acetaminophen **OR** acetaminophen      ALLERGIES:   Allergies as of 03/05/2023 - Fully Reviewed 03/05/2023   Allergen Reaction Noted
[Held by provider] insulin glargine  5 Units SubCUTAneous QAM AC    metoprolol succinate  25 mg Oral Daily    hydrALAZINE  75 mg Oral 3 times per day    guaiFENesin  600 mg Oral BID    NIFEdipine  30 mg Oral BID    azelastine  1 drop Both Eyes BID    latanoprost  1 drop Both Eyes Nightly    levothyroxine  100 mcg Oral QAM AC    traZODone  50 mg Oral Nightly    amiodarone  200 mg Oral Daily    apixaban  2.5 mg Oral BID    atorvastatin  40 mg Oral Nightly    aspirin  81 mg Oral Daily    [Held by provider] alogliptin  6.25 mg Oral Daily    sodium chloride flush  5-40 mL IntraVENous 2 times per day     Continuous Infusions:   dextrose      sodium chloride       PRN Meds:.sodium chloride, guaiFENesin-dextromethorphan, hydrALAZINE, albuterol sulfate HFA, cetirizine, dextrose bolus **OR** dextrose bolus, glucagon (rDNA), dextrose, sodium chloride flush, sodium chloride, ondansetron **OR** ondansetron, polyethylene glycol, acetaminophen **OR** acetaminophen      Vitals:  BP (!) 169/65   Pulse 66   Temp 97.8 °F (36.6 °C) (Oral)   Resp 18   Ht 5' 3\" (1.6 m)   Wt 149 lb 4.8 oz (67.7 kg)   SpO2 97%   BMI 26.45 kg/m²       Physical Exam  General : AAOx3, not in pain or respiratory distress, resting in bed, eating lunch  HEENT : normocephalic, atraumatic, mucosa moist, no palor or icterus  CVS: S1 S2 normal, regular rhythm, no murmurs or rubs. Lungs: Clear, no wheezing or crackles. Abd: Soft, bowel sounds normal, non-tender. Ext: Bilateral lower extremity edema +  Skin: Warm. No rashes appreciated.   : bladder non-distended, no tenderness over the bladder    Labs:  CBC with Differential:    Lab Results   Component Value Date/Time    WBC 8.6 03/17/2023 07:23 PM    RBC 3.72 03/17/2023 07:23 PM    HGB 9.4 03/17/2023 07:23 PM    HCT 29.9 03/17/2023 07:23 PM     03/17/2023 07:23 PM    MCV 80.5 03/17/2023 07:23 PM    MCH 25.3 03/17/2023 07:23 PM    MCHC 31.5 03/17/2023 07:23 PM    RDW 24.9 03/17/2023 07:23 PM
beta-blocker, amiodarone and Eliquis    Iron deficiency anemia: Status post IV Venofer  Continue p.o. supplementation. Medications:  Reviewed  Infusion Medications    dextrose      sodium chloride       Scheduled Medications    insulin glargine  5 Units SubCUTAneous QAM AC    metoprolol succinate  25 mg Oral Daily    hydrALAZINE  75 mg Oral 3 times per day    guaiFENesin  600 mg Oral BID    insulin lispro  0-16 Units SubCUTAneous TID WC    insulin lispro  0-4 Units SubCUTAneous Nightly    NIFEdipine  30 mg Oral BID    azelastine  1 drop Both Eyes BID    latanoprost  1 drop Both Eyes Nightly    levothyroxine  100 mcg Oral QAM AC    traZODone  50 mg Oral Nightly    amiodarone  200 mg Oral Daily    apixaban  2.5 mg Oral BID    atorvastatin  40 mg Oral Nightly    aspirin  81 mg Oral Daily    alogliptin  6.25 mg Oral Daily    sodium chloride flush  5-40 mL IntraVENous 2 times per day     PRN Meds: sodium chloride, guaiFENesin-dextromethorphan, hydrALAZINE, albuterol sulfate HFA, cetirizine, dextrose bolus **OR** dextrose bolus, glucagon (rDNA), dextrose, sodium chloride flush, sodium chloride, ondansetron **OR** ondansetron, polyethylene glycol, acetaminophen **OR** acetaminophen      DVT Prophylaxis: On Eliquis  Diet: ADULT DIET; Regular; 4 carb choices (60 gm/meal); Low Sodium (2 gm); 1500 ml; No Drinking Straws  Code Status: DNR-CCA    Dispo: Anticipate discharge in 2 to 3 days    ____________________________________________________________________________    Subjective:   Overnight Events:   Uneventful overnight  No significant diuresis overnight    Physical Exam:  BP (!) 159/57   Pulse 70   Temp 98 °F (36.7 °C) (Temporal)   Resp 15   Ht 5' 3\" (1.6 m)   Wt 154 lb 15.7 oz (70.3 kg)   SpO2 91%   BMI 27.45 kg/m²   General appearance: No apparent distress, appears stated age and cooperative. HEENT: Normocephalic, atraumatic, MMM, No sclera icterus/conjuctival palor  Neck: Supple, no thyromegally.  No
breakfast) (Patient not taking: Reported on 3/5/2023)      aspirin 81 MG EC tablet Take 81 mg by mouth daily      amiodarone (CORDARONE) 200 MG tablet Take 200 mg by mouth 2 times daily      apixaban (ELIQUIS) 2.5 MG TABS tablet Take 5 mg by mouth 2 times daily Patient does not know dose       linagliptin (TRADJENTA) 5 MG tablet Take 5 mg by mouth daily      canagliflozin (INVOKANA) 100 MG TABS tablet Take 100 mg by mouth 2 times daily      atorvastatin (LIPITOR) 40 MG tablet Take 40 mg by mouth daily      citalopram (CELEXA) 10 MG tablet Take 10 mg by mouth daily (Patient not taking: Reported on 3/5/2023)      azelastine (OPTIVAR) 0.05 % ophthalmic solution 1 drop 2 times daily      latanoprost (XALATAN) 0.005 % ophthalmic solution Place 1 drop into both eyes nightly          hydrALAZINE  50 mg Oral 3 times per day    guaiFENesin  600 mg Oral BID    insulin glargine  20 Units SubCUTAneous QAM AC    ipratropium-albuterol  1 ampule Inhalation Q4H WA    insulin lispro  0-16 Units SubCUTAneous TID WC    insulin lispro  0-4 Units SubCUTAneous Nightly    NIFEdipine  30 mg Oral BID    azelastine  1 drop Both Eyes BID    latanoprost  1 drop Both Eyes Nightly    levothyroxine  100 mcg Oral QAM AC    traZODone  50 mg Oral Nightly    metoprolol succinate  50 mg Oral Daily    amiodarone  200 mg Oral Daily    apixaban  2.5 mg Oral BID    atorvastatin  40 mg Oral Nightly    aspirin  81 mg Oral Daily    alogliptin  6.25 mg Oral Daily    sodium chloride flush  5-40 mL IntraVENous 2 times per day      furosemide (LASIX) 1mg/mL infusion 2.5 mg/hr (03/09/23 0943)    dextrose      sodium chloride       sodium chloride, guaiFENesin-dextromethorphan, hydrALAZINE, albuterol sulfate HFA, cetirizine, dextrose bolus **OR** dextrose bolus, glucagon (rDNA), dextrose, sodium chloride flush, sodium chloride, ondansetron **OR** ondansetron, polyethylene glycol, acetaminophen **OR** acetaminophen      ALLERGIES:   Allergies as of 03/05/2023 -
currently control with nifedipine, hydralazine and Toprol. Monitor closely    Paroxysmal atrial fibrillation ([address]): Stable. Sinus rhythm on telemetry. Continue beta-blocker, amiodarone and Eliquis    Iron deficiency anemia: Status post IV Venofer  Continue p.o. supplementation. Medications:  Reviewed  Infusion Medications    dextrose      sodium chloride       Scheduled Medications    torsemide  20 mg Oral Daily    insulin lispro  0-4 Units SubCUTAneous TID WC    ferrous sulfate  325 mg Oral BID WC    [Held by provider] insulin glargine  5 Units SubCUTAneous QAM AC    metoprolol succinate  25 mg Oral Daily    hydrALAZINE  75 mg Oral 3 times per day    guaiFENesin  600 mg Oral BID    NIFEdipine  30 mg Oral BID    azelastine  1 drop Both Eyes BID    latanoprost  1 drop Both Eyes Nightly    levothyroxine  100 mcg Oral QAM AC    traZODone  50 mg Oral Nightly    amiodarone  200 mg Oral Daily    apixaban  2.5 mg Oral BID    atorvastatin  40 mg Oral Nightly    aspirin  81 mg Oral Daily    [Held by provider] alogliptin  6.25 mg Oral Daily    sodium chloride flush  5-40 mL IntraVENous 2 times per day     PRN Meds: sodium chloride, guaiFENesin-dextromethorphan, hydrALAZINE, albuterol sulfate HFA, cetirizine, dextrose bolus **OR** dextrose bolus, glucagon (rDNA), dextrose, sodium chloride flush, sodium chloride, ondansetron **OR** ondansetron, polyethylene glycol, acetaminophen **OR** acetaminophen      DVT Prophylaxis: On Eliquis  Diet: ADULT DIET; Regular; 4 carb choices (60 gm/meal); Low Sodium (2 gm); 1500 ml; No Drinking Straws  Code Status: DNR-CCA    Dispo:  Anticipate discharge in 2 to 3 days    ____________________________________________________________________________    Subjective:   Overnight Events:   Uneventful overnight  No significant diuresis overnight    Physical Exam:  BP (!) 173/49   Pulse 68   Temp 97.8 °F (36.6 °C) (Temporal)   Resp 20   Ht 5' 3\" (1.6 m)   Wt 148 lb 13 oz (67.5 kg)   SpO2
evaluate if patient has [address] needs for personal care    If patient discharges prior to next session this note will serve as a discharge summary. Please see below for the latest assessment towards goals.      DME Required For Discharge: no DME required at discharge  Precautions/Restrictions: high fall risk, fluid restriction  Weight Bearing Restrictions: no restrictions     Required Braces/Orthotics: no braces required  Positional Restrictions:no positional restrictions    Pre-Admission Information   Lives With: alone - 2 sons live nearby and can provide assistance PRN    Type of Home: house, quad level home  Home Layout: quad level , stair lift to all levels  Home Access: level entry  Bathroom Layout: tub/shower unit  Bathroom Equipment: grab bars in shower, tub transfer bench, hand held shower head  Toilet Height: elevated height  Home Equipment: rolling walker, alert button, bed rail, states has walker on every level  Transfer Assistance: Independent without use of device  Ambulation Assistance:modified independent with use of RW  ADL Assistance: independent with all ADL's  IADL Assistance: requires assistance with meal prep, requires assistance with laundry, requires assistance with cleaning, does light cooking, children provide meals and cleaning assistance  Active :        [] Yes  [x] No - sons provide transportation  Hand Dominance: [] Left  [] Right  Hobbies: talking to friends on phone, reading mysteries  Recent Falls: reports 1 recent fall - states she lose balance and sat down on floor, states she had to call son to get back up  States currently getting home therapy but thinks she only has 1 more visit,     Examination   Vision:   Vision Gross Assessment: Impaired and Vision Corrective Device: wears glasses at all times  Hearing:   hard of hearing, left hearing aid  Observation:   Purewick in place    Sensation:   States occasional N/T in fingers in R hand - attributed to history of
lifts to access home   Precautions/Restrictions: high fall risk, up as tolerated, strict I&O, adult diet - regular, low sodium, 1500ml daily fluid restriction  Weight Bearing Restrictions: no restrictions     Required Braces/Orthotics: no braces required  Positional Restrictions:no positional restrictions    Pre-Admission Information from Initial Evaluation:   Lives With: alone - 2 sons live nearby and can provide assistance PRN    Type of Home: house, quad level home  Home Layout: quad level , stair lift to all levels  Home Access: level entry  Bathroom Layout: tub/shower unit  Bathroom Equipment: grab bars in shower, tub transfer bench, hand held shower head  Toilet Height: elevated height  Home Equipment: rolling walker, alert button, bed rail, states has walker on every level  Transfer Assistance: Independent without use of device  Ambulation Assistance:modified independent with use of RW  ADL Assistance: independent with all ADL's  IADL Assistance: requires assistance with meal prep, requires assistance with laundry, requires assistance with cleaning, does light cooking, children provide meals and cleaning assistance  Active :        [] Yes  [x] No - sons provide transportation  Hand Dominance: [] Left  [] Right  Hobbies: talking to friends on phone, reading mysteries  Recent Falls: reports 1 recent fall - states she lose balance and sat down on floor, states she had to call son to get back up  States currently getting home therapy but thinks she only has 1 more visit,     Examination   Vision:   Vision Gross Assessment: Impaired and Vision Corrective Device: wears glasses at all times  Hearing:   hard of hearing, left hearing aid  Observation:   Pt up in chair, asleep, awakens easily. Subjective  General: Pt agreeable to PT tx  Pain: 0/10  Pain Interventions: not applicable       Functional Mobility    Bed Mobility  Bed mobility not completed on this date.   Comments: HOB elevated to 55 degree,
light. Conjunctivae/corneas clear. Neck: Supple, with full range of motion. No jugular venous distention. Trachea midline. Respiratory:  BL rales. No wheezing or rhonchi. Cardiovascular: Regular rate and rhythm with normal S1/S2 without murmurs, rubs or gallops. Abdomen: Soft, non-tender, non-distended with normal bowel sounds. Musculoskeletal: No clubbing, cyanosis or edema bilaterally. Full range of motion without deformity. Skin: Skin color, texture, turgor normal.  No rashes or lesions. Neurologic:  Neurovascularly intact without any focal sensory/motor deficits. Cranial nerves: II-XII intact, grossly non-focal.  Psychiatric: Alert and oriented, thought content appropriate, normal insight  Capillary Refill: Brisk, 3 seconds, normal   Peripheral Pulses: +2 palpable, equal bilaterally       Labs:   Recent Labs     03/05/23  1314 03/06/23  0415   WBC 6.7 4.9   HGB 10.6* 9.6*   HCT 34.4* 30.7*    202     Recent Labs     03/05/23  1314 03/06/23  0415    139   K 4.6 3.9    103   CO2 27 28   BUN 19 18   CREATININE 1.3* 1.3*   CALCIUM 9.3 8.4     Recent Labs     03/05/23  1314   AST 32   ALT 21   BILITOT 0.3   ALKPHOS 157*     No results for input(s): INR in the last 72 hours. Recent Labs     03/05/23  1314   TROPONINI 0.01       Urinalysis:      Lab Results   Component Value Date/Time    NITRU Negative 03/05/2023 01:14 PM    WBCUA 1 03/05/2023 01:14 PM    BACTERIA None Seen 03/05/2023 01:14 PM    RBCUA 1 03/05/2023 01:14 PM    BLOODU Negative 03/05/2023 01:14 PM    SPECGRAV 1.015 03/05/2023 01:14 PM    GLUCOSEU Negative 03/05/2023 01:14 PM       Radiology:  CT CHEST PULMONARY EMBOLISM W CONTRAST   Final Result   Negative CTA for pulmonary embolus. Mild bilateral pleural effusions with minimal bibasilar atelectasis with   thickening of the interlobular septa compatible with pulmonary edema. XR CHEST PORTABLE   Final Result   Cardiomegaly and pulmonary venous congestion.
or in puree. Pt appears to be tolerating her current diet, no further ST indicated for dysphagia. Dysphagia Goals:   Pt will functionally tolerate recommended diet with no overt clinical s/s of aspiration (Ongoing 03/21/23)  Pt will demonstrate understanding of aspiration risk and precautions via education/demonstration with occasional prompting (Ongoing 03/21/23)  If clinical s/s of aspiration/penetration continue to be noted, Pt will participate in Modified Barium Swallow Study (Ongoing 03/21/23)    Plan:   No further follow-up indicated     Patient/Family Education:  Provided education regarding role of SLP, recommendations and general speech pathology plan of care. [x] Pt verbalized understanding and agreement   [] Pt requires ongoing learning   [] No evidence of comprehension     Discharge Recommendations:    Do not anticipate need for further speech/dysphagia therapy upon discharge from hospital     Treatment time:  Timed Code Treatment Minutes: 0  Total Treatment time: 8 minutes    If patient discharges prior to next session this note will serve as a discharge summary. Signature:   SHABBIR [employer]SP. [id number] [facility]
overt clinical s/s of aspiration (Ongoing 03/08/23)  Pt will demonstrate understanding of aspiration risk and precautions via education/demonstration with occasional prompting (Ongoing 03/08/23)  If clinical s/s of aspiration/penetration continue to be noted, Pt will participate in Modified Barium Swallow Study (Ongoing 03/08/23)    Plan:   1-2 times to ensure diet tolerance. Patient/Family Education:  Provided education regarding role of SLP, recommendations and general speech pathology plan of care. [x] Pt verbalized understanding and agreement   [] Pt requires ongoing learning   [] No evidence of comprehension     Discharge Recommendations:    Discharge recommendations to be determined pending ongoing follow-up during acute care stay    Treatment time:  Timed Code Treatment Minutes: 0  Total Treatment time: 10 minutes    If patient discharges prior to next session this note will serve as a discharge summary.      Signature:   Bernice Chappell. CF- SLP  Speech Language Pathologist  [id number]
status     Dysphagia Impressions/Diagnosis: Oropharyngeal Dysphagia   Pt was positioned upright in chair on 2L O2 via nasal cannula. She had just consumed grilled cheese, tomato soup and thin liquids via straw. Vocal quality was clear. Pt largely denied any difficulty with swallowing. She does report chronic cough (states for last \"7 years\") of unclear etiology. She denies any recent recurrent PNA. She was able to self feed trials of thin liquids, puree and regular solids. Pt again demonstrated suspected presybyphagia with mild oropharyngeal deficits characterized by minimally prolonged mastication, suspected premature bolus loss and slight delayed swallow initiation with audible swallows. Prior to and few minutes following PO trials pt demonstrated congested coughing, pt reported this is her baseline cough. No immediate overt clinical s/s of aspiration/penetration were assessed. Overall pt appeared to tolerate regular solids and thin liquids however, silent aspiration can not definitively ruled. SLP provided education re; safe swallow strategies, risk for aspiration due to current respiratory state and s/s of aspiration. IF aspiration is suspected as contributing factor to respiratory status pt may benefit from completion of Modified Barium Swallow to further assess pharyngeal phase of swallow. Recommended Diet and Intervention 3/7/2023:  Diet Solids Recommendation:  Regular texture diet  Liquid Consistency Recommendation: Thin liquids  Recommended form of Meds: Meds whole with water     Compensatory Swallowing Strategies:  Upright as possible with all PO intake , Small bites/sips , Eat/feed slowly    SHORT TERM DYSPHAGIA GOALS/PLAN OF CARE: Speech therapy for dysphagia tx 1-2 times to ensure diet tolerance.   Pt will functionally tolerate recommended diet with no overt clinical s/s of aspiration   If clinical s/s of aspiration/penetration continue to be noted, Pt will participate in Modified Barium Swallow
strategies. Pt demonstrates increased risk for aspiration due to respiratory status, weak cough, and prior hx of dysphagia. Based on today's assessment recommend Easy to Chew diet texture with Thin liquids, Meds whole with water or in puree. IF aspiration is suspected as contributing factor to respiratory status, pt may benefit from completion of Modified Barium Swallow to further assess pharyngeal phase of swallow. Dysphagia Goals:   Pt will functionally tolerate recommended diet with no overt clinical s/s of aspiration (Ongoing 03/20/23)  Pt will demonstrate understanding of aspiration risk and precautions via education/demonstration with occasional prompting (Ongoing 03/20/23)  If clinical s/s of aspiration/penetration continue to be noted, Pt will participate in Modified Barium Swallow Study (Ongoing 03/20/23)    Plan:   1-2 times to ensure diet tolerance. Patient/Family Education:  Provided education regarding role of SLP, recommendations and general speech pathology plan of care. [x] Pt verbalized understanding and agreement   [] Pt requires ongoing learning   [] No evidence of comprehension     Discharge Recommendations:    Do not anticipate need for further speech/dysphagia therapy upon discharge from hospital     Treatment time:  Timed Code Treatment Minutes: 0  Total Treatment time: 10 minutes    If patient discharges prior to next session this note will serve as a discharge summary. Signature:   [place], [facility]  Speech-Language Pathology Graduate Clinician    The speech-language pathologist was present, directed the patient's care, made skilled judgment and was responsible for assessment and treatment.     Natali Romero, [id number] [facility]  Speech-Language Pathologist  [phone] [phone]
with no overt clinical s/s of aspiration (Ongoing 03/09/23)  Pt will demonstrate understanding of aspiration risk and precautions via education/demonstration with occasional prompting (Ongoing 03/09/23)  If clinical s/s of aspiration/penetration continue to be noted, Pt will participate in Modified Barium Swallow Study (Ongoing 03/09/23)    Plan:   1-2 times to ensure diet tolerance. Patient/Family Education:  Provided education regarding role of SLP, recommendations and general speech pathology plan of care. [x] Pt verbalized understanding and agreement   [] Pt requires ongoing learning   [] No evidence of comprehension     Discharge Recommendations:    Do not anticipate need for further speech/dysphagia therapy upon discharge from hospital     Treatment time:  Timed Code Treatment Minutes: 0  Total Treatment time: 15 minutes    If patient discharges prior to next session this note will serve as a discharge summary.      Signature:   [place], [place]. CF- SLP  Speech Language Pathologist  [id number]
09:08 PM    GLUCOSEU Negative 03/16/2023 09:08 PM         IMPRESSION/RECOMMENDATIONS:      Diagnosis and Plan     HTN  Blood pressure is still high does have fluid overload   We will monitor while diuresing her  NATHAN   Likely a subset of cardiorenal syndrome secondary to CHF and its treatment  Renal vein congestion could be contributing   CKD 3b   Baseline creatinine 1.3-1.5  Likely due to diabetes mellitus and/or hypertension  dCHF  Still persistent will need more aggressive diuresis  I have explained to her that we might have to accept higher level of kidney function measures as the cost of treating the CHF  Atrial fibrillation  Rate controlled / Robert Anguiano MD
Agreeable to therapy. Pain: 0/10  Pain Interventions: not applicable       Functional Mobility  Bed Mobility  Bed mobility not completed on this date. Comments:  Transfers  Sit to stand transfer: stand by assistance  Stand to sit transfer: stand by assistance  Comments: from chair x 2  Ambulation  Surface:level surface  Assistive Device: rolling walker  Assistance: stand by assistance  Distance: 40' x 2  Gait Mechanics: steady gait, flexed posture, decreased bob  Comments:  2L O2, SpO2 95%  Stair Mobility  Stair mobility not completed on this date. Comments:    Balance  Static Sitting Balance: fair (+): maintains balance at SBA/supervision without use of UE support  Dynamic Sitting Balance: fair (+): maintains balance at SBA/supervision without use of UE support  Static Standing Balance: fair (-): maintains balance at SBA with use of UE support  Dynamic Standing Balance: fair (-): maintains balance at SBA with use of UE support  Comments: stood at sink 2-3' engaged in UE task with SBA provided for balance    Other Therapeutic Interventions    Functional Outcomes  AM-PAC Inpatient Mobility Raw Score : 19              Cognition  WFL  Orientation:    alert and oriented x 4  Command Following:   [facility]    Education  Barriers To Learning: hearing  Patient Education: patient educated on goals, PT role and benefits, general safety, functional mobility training, discharge recommendations  Learning Assessment:  patient verbalizes and demonstrates understanding    Assessment  Activity Tolerance: good  Impairments Requiring Therapeutic Intervention: decreased functional mobility, decreased endurance, decreased balance  Prognosis: good  Clinical Assessment: Patient not at baseline function and would benefit from skilled PT to address above deficits and facilitate return to baseline function. Typically independent in home, currently needing SBA for balance.      Safety Interventions: patient left in chair, call light within
HOB elevated and increased time    Transfers  Sit to stand transfer: contact guard assistance, minimal assistance  Stand to sit transfer: contact guard assistance, minimal assistance   Comments: patient required min A of 1 at time for sit to stand and for stand to sit. Occasional decreased eccentric control during stand to sit. Ambulation  Surface:level surface  Assistive Device: rolling walker  Assistance: contact guard assistance  Distance: 20 ft >> 5 minute seated rest break >> 15 ft   Gait Mechanics:decreased gait velocity, decreased stride length, NBOS and slight forward flexed posture  Comments:  SpO2 89% immediately after ambulation but up to 91% within 15 seconds  Stair Mobility  Stair mobility not completed on this date. Comments:  Wheelchair Mobility:  No w/c mobility completed on this date. Comments    Balance  Static Sitting Balance: fair (+): maintains balance at SBA/supervision without use of UE support  Dynamic Sitting Balance: fair (+): maintains balance at SBA/supervision without use of UE support  Static Standing Balance: poor (+): requires min (A) to maintain balance  Dynamic Standing Balance: poor (+): requires min (A) to maintain balance  Comments: standing balance CGA to Min A    Other functional activities:    Functional Outcomes  AM-PAC Inpatient Mobility Raw Score : 17              Cognition  WFL  Orientation:    alert and oriented x 4  Command Following:   [facility]    Education  Barriers To Learning: hearing  Patient Education: patient educated on goals, PT role and benefits, general safety, functional mobility training, discharge recommendations, use of call light, energy conservation, pursed lip breathing   Learning Assessment:  patient verbalizes and demonstrates understanding    Assessment  Activity Tolerance: good, Supine / 54, SpO2 91%, HR 80; SpO2 throughout session 90-92% except slight drop after ambulation.    Impairments Requiring Therapeutic Intervention: decreased functional
Lab Results   Component Value Date/Time    NITRU Negative 03/05/2023 01:14 PM    WBCUA 1 03/05/2023 01:14 PM    BACTERIA None Seen 03/05/2023 01:14 PM    RBCUA 1 03/05/2023 01:14 PM    BLOODU Negative 03/05/2023 01:14 PM    SPECGRAV 1.015 03/05/2023 01:14 PM    GLUCOSEU Negative 03/05/2023 01:14 PM       Radiology:  XR CHEST PORTABLE   Final Result   1. Small right pleural effusion with right basilar airspace disease favored   to represent pneumonia. 2.  Mild left basilar airspace disease likely representing atelectasis         CT CHEST PULMONARY EMBOLISM W CONTRAST   Final Result   Negative CTA for pulmonary embolus. Mild bilateral pleural effusions with minimal bibasilar atelectasis with   thickening of the interlobular septa compatible with pulmonary edema. XR CHEST PORTABLE   Final Result   Cardiomegaly and pulmonary venous congestion. IP CONSULT TO HOSPITALIST  IP CONSULT TO HEART FAILURE NURSE/COORDINATOR  IP CONSULT TO HOME CARE NEEDS  IP CONSULT TO CARDIOLOGY  IP CONSULT TO PULMONOLOGY    Assessment/Plan:    Active Hospital Problems    Diagnosis     Hypertensive urgency [I16.0]      Priority: Medium    Anemia [D64.9]      Priority: Medium    Chronic atrial fibrillation ([address] [I48.20]      Priority: Medium    Shortness of breath [R06.02]      Priority: Medium    Acute on chronic diastolic heart failure (HCC) [I50.33]      Priority: Medium    Malignant hypertension [I10]      Priority: Medium    Pleural effusion [J90]      Priority: Medium    Paroxysmal atrial fibrillation (HCC) [I48.0]      Malignant HTN   Weaned off Cardene gtt  Continue Nifedipine  30 mg bid  Continue Toprol XL 50 mg daily  Hydralazine IV PRN  2.  Acute on chronic diastolic CHF   - Continue Lasix gtt   - Ordered one dose of Zaroxolyn today   - Strict I/O   - Fluid restrict 1.5 L daily   - Daily weights on scale   - CHF consult   - Cardiology consult appreciated   - Repeat BNP 12 K, check again in
Prophylaxis: On Eliquis  Diet: ADULT DIET; Regular; 4 carb choices (60 gm/meal); Low Sodium (2 gm); 1500 ml;  No Drinking Straws  Code Status: DNR-CCA      Dispo - once acute medical processes have resolved    Marisol Malik MD
canagliflozin (INVOKANA) 100 MG TABS tablet Take 100 mg by mouth 2 times daily  Historical Provider, MD   atorvastatin (LIPITOR) 40 MG tablet Take 40 mg by mouth daily  Historical Provider, MD   citalopram (CELEXA) 10 MG tablet Take 10 mg by mouth daily  Patient not taking: Reported on 3/5/2023  Historical Provider, MD   azelastine (OPTIVAR) 0.05 % ophthalmic solution 1 drop 2 times daily  Historical Provider, MD   latanoprost (XALATAN) 0.005 % ophthalmic solution Place 1 drop into both eyes nightly  Historical Provider, MD        Scheduled Meds:   ipratropium-albuterol  1 ampule Inhalation BID    hydrALAZINE  50 mg Oral 3 times per day    iron sucrose  200 mg IntraVENous Q24H    guaiFENesin  600 mg Oral BID    insulin glargine  20 Units SubCUTAneous QAM AC    insulin lispro  0-16 Units SubCUTAneous TID WC    insulin lispro  0-4 Units SubCUTAneous Nightly    NIFEdipine  30 mg Oral BID    azelastine  1 drop Both Eyes BID    latanoprost  1 drop Both Eyes Nightly    levothyroxine  100 mcg Oral QAM AC    traZODone  50 mg Oral Nightly    metoprolol succinate  50 mg Oral Daily    amiodarone  200 mg Oral Daily    apixaban  2.5 mg Oral BID    atorvastatin  40 mg Oral Nightly    aspirin  81 mg Oral Daily    alogliptin  6.25 mg Oral Daily    sodium chloride flush  5-40 mL IntraVENous 2 times per day     Continuous Infusions:   dextrose      sodium chloride       PRN Meds:sodium chloride, guaiFENesin-dextromethorphan, hydrALAZINE, albuterol sulfate HFA, cetirizine, dextrose bolus **OR** dextrose bolus, glucagon (rDNA), dextrose, sodium chloride flush, sodium chloride, ondansetron **OR** ondansetron, polyethylene glycol, acetaminophen **OR** acetaminophen     Review of Systems:  Constitutional: Negative for fever, night sweats, chills,  Skin: Negative for rash, pruritus, bleeding, or bruising    HEENT: Negative for vision changes, ringing in the ears, dysphagia  Respiratory: Reviewed in HPI  Cardiovascular: Reviewed in
compatible with pulmonary edema. XR CHEST PORTABLE   Final Result   Cardiomegaly and pulmonary venous congestion. IP CONSULT TO HOSPITALIST  IP CONSULT TO HEART FAILURE NURSE/COORDINATOR  IP CONSULT TO HOME CARE NEEDS    Assessment/Plan:    Active Hospital Problems    Diagnosis     Acute on chronic diastolic CHF (congestive heart failure) (HCC) [I50.33]      Priority: Medium    Malignant hypertension [I10]      Priority: Medium    Pleural effusion [J90]      Priority: Medium    Paroxysmal atrial fibrillation (HCC) [I48.0]      Malignant HTN   Wean off Cardene gtt  Increase Nifedipine  30 mg bid  Continue Toprol XL 50 mg daily  Hydralazine IV PRN  2. Acute on chronic diastolic CHF   - Continue Lasix 40 mg IV bid   - Strict I/O   - Fluid restrict 1.5 L daily   - Daily weights on scale   - CHF consult   - Cardiology consult  3. pAfib   - Continue Eliquis   - Continue Amio and Toprol XL  4. DM 2   - Continue SSI   - Continue Nesina    DVT Prophylaxis: Eliquis  Diet: ADULT DIET; Regular; 4 carb choices (60 gm/meal); Low Sodium (2 gm); 1500 ml  ADULT ORAL NUTRITION SUPPLEMENT; Breakfast, Dinner; Diabetic Oral Supplement  Code Status: Full Code  PT/OT Eval Status: Following    Dispo - Home with home PT/OT    Discussed with patient, nursing and CM. Will ask Cardiology to see. Home in next 24-48 hrs.     Aleja Bland MD
cooperative. HEENT: Normocephalic, atraumatic, MMM, No sclera icterus/conjuctival palor  Neck: Supple, no thyromegally. No jugular venous distention. Respiratory:  Normal respiratory effort. Clear to auscultation, no Rales/Wheezes/Rhonchi. Cardiovascular: S1/S2 without murmurs, rubs or gallops. RRR  Abdomen: Soft, non-tender, non-distended, bowel sounds present. Musculoskeletal: No clubbing, cyanosis or edema bilaterally. Skin: Skin color, texture, turgor normal.  No rashes or lesions. Neurologic:  Cranial nerves: II-XII intact, NAT, No focal sensory/motor deficits  Psychiatric: Alert and oriented, thought content appropriate  Capillary Refill: Brisk,< 3 seconds   Peripheral Pulses: +2 palpable, equal bilaterally       Intake/Output Summary (Last 24 hours) at 3/12/2023 1320  Last data filed at 3/12/2023 0818  Gross per 24 hour   Intake 840 ml   Output 500 ml   Net 340 ml         Labs:   Recent Labs     03/10/23  0415 03/11/23 0435   WBC 7.2 7.3   HGB 8.8* 8.9*   HCT 28.5* 28.7*    271        Recent Labs     03/10/23  0415 03/10/23  0935 03/11/23 0435 03/12/23  0809   *  --  133* 130*   K 4.7 3.9 3.8 4.3   CL 95*  --  96* 95*   CO2 27  --  28 29   BUN 38*  --  41* 38*   CREATININE 1.6*  --  1.6* 1.5*   CALCIUM 8.5  --  8.7 8.7       No results for input(s): Td Harrell in the last 72 hours. Urinalysis:    Lab Results   Component Value Date/Time    NITRU Negative 03/05/2023 01:14 PM    WBCUA 1 03/05/2023 01:14 PM    BACTERIA None Seen 03/05/2023 01:14 PM    RBCUA 1 03/05/2023 01:14 PM    BLOODU Negative 03/05/2023 01:14 PM    SPECGRAV 1.015 03/05/2023 01:14 PM    GLUCOSEU Negative 03/05/2023 01:14 PM       Radiology:  XR CHEST PORTABLE   Final Result   1. Small right pleural effusion with right basilar airspace disease favored   to represent pneumonia.       2.  Mild left basilar airspace disease likely representing atelectasis         CT CHEST PULMONARY EMBOLISM W CONTRAST   Final
cooperative. HEENT: Normocephalic, atraumatic, MMM, No sclera icterus/conjuctival palor  Neck: Supple, no thyromegally. No jugular venous distention. Respiratory:  Normal respiratory effort. Clear to auscultation, no Rales/Wheezes/Rhonchi. Cardiovascular: S1/S2 without murmurs, rubs or gallops. RRR  Abdomen: Soft, non-tender, non-distended, bowel sounds present. Musculoskeletal: No clubbing, cyanosis pitting leg edema bilaterally. Skin: Skin color, texture, turgor normal.  No rashes or lesions. Neurologic:  Cranial nerves: II-XII intact, NAT, No focal sensory/motor deficits        Intake/Output Summary (Last 24 hours) at 3/14/2023 1218  Last data filed at 3/14/2023 0810  Gross per 24 hour   Intake 748.7 ml   Output 1620 ml   Net -871.3 ml         Labs:   Recent Labs     03/14/23  0252   WBC 8.7   HGB 9.0*   HCT 27.9*           Recent Labs     03/12/23  0809 03/13/23  0500 03/14/23  0252   * 128* 131*   K 4.3 4.7 3.7   CL 95* 92* 93*   CO2 29 28 28   BUN 38* 40* 41*   CREATININE 1.5* 1.6* 1.7*   CALCIUM 8.7 8.8 8.7       No results for input(s): Sabrina Turner in the last 72 hours. Urinalysis:    Lab Results   Component Value Date/Time    NITRU Negative 03/05/2023 01:14 PM    WBCUA 1 03/05/2023 01:14 PM    BACTERIA None Seen 03/05/2023 01:14 PM    RBCUA 1 03/05/2023 01:14 PM    BLOODU Negative 03/05/2023 01:14 PM    SPECGRAV 1.015 03/05/2023 01:14 PM    GLUCOSEU Negative 03/05/2023 01:14 PM       Radiology:  XR CHEST PORTABLE   Final Result   1. Small right pleural effusion with right basilar airspace disease favored   to represent pneumonia. 2.  Mild left basilar airspace disease likely representing atelectasis         CT CHEST PULMONARY EMBOLISM W CONTRAST   Final Result   Negative CTA for pulmonary embolus. Mild bilateral pleural effusions with minimal bibasilar atelectasis with   thickening of the interlobular septa compatible with pulmonary edema.          XR CHEST
diuresis overnight    Physical Exam:  BP (!) 175/56   Pulse 60   Temp 97.6 °F (36.4 °C) (Oral)   Resp 18   Ht 5' 3\" (1.6 m)   Wt 149 lb 4.8 oz (67.7 kg)   SpO2 97%   BMI 26.45 kg/m²   General appearance: No apparent distress, appears stated age and cooperative. HEENT: Normocephalic, atraumatic, MMM, No sclera icterus/conjuctival palor  Neck: Supple, no thyromegally. No jugular venous distention. Respiratory:  Normal respiratory effort. Clear to auscultation, no Rales/Wheezes/Rhonchi. Cardiovascular: S1/S2 without murmurs, rubs or gallops. RRR  Abdomen: Soft, non-tender, non-distended, bowel sounds present. Musculoskeletal: No clubbing, cyanosis. Pitting leg edema bilaterally. Skin: Skin color, texture, turgor normal.  No rashes or lesions. Neurologic:  Cranial nerves: II-XII intact, NAT, No focal sensory/motor deficits        Intake/Output Summary (Last 24 hours) at 3/18/2023 0821  Last data filed at 3/17/2023 1651  Gross per 24 hour   Intake 480 ml   Output 240 ml   Net 240 ml         Labs:   Recent Labs     03/17/23  1923   WBC 8.6   HGB 9.4*   HCT 29.9*           Recent Labs     03/15/23  1050 03/16/23  0540 03/17/23  0422   * 135* 133*   K 4.2 3.3* 4.7   CL 92* 97* 96*   CO2 25 28 26   BUN 43* 42* 42*   CREATININE 1.9* 1.8* 1.9*   CALCIUM 8.8 8.7 8.8       No results for input(s): Ceil Coke in the last 72 hours. Urinalysis:    Lab Results   Component Value Date/Time    NITRU Negative 03/16/2023 09:08 PM    WBCUA 29 03/16/2023 09:08 PM    BACTERIA 4+ 03/16/2023 09:08 PM    RBCUA 0 03/16/2023 09:08 PM    BLOODU Negative 03/16/2023 09:08 PM    SPECGRAV 1.015 03/16/2023 09:08 PM    GLUCOSEU Negative 03/16/2023 09:08 PM       Radiology:  XR CHEST PORTABLE   Final Result   1. Congestive heart failure is most likely given the radiographic findings;   pneumonia is also a consideration in areas of consolidation with pleural   effusion. 2. Calcific atherosclerosis aorta.    3.
hours  5. Continue procardia 30 mg twice a day   6. Will give a dose of samsca today  7. Add bnp to blood this morning    Should consider palliative care consult    Discussed with hospitalist and bedside nurse    Discussed with patient who is agreeable with plan of care. Thank you for allowing me to participate in the care of your patient.     Priya Elizabeth, APRN - CNS, CNS
thinks she only has 1 more visit,     Examination   Vision:   Vision Gross Assessment: Impaired and Vision Corrective Device: wears glasses at all times  Hearing:   hard of hearing, left hearing aid  Observation:   Purewick in place        Subjective  General:  Patient seated in recliner upon arrival - agreeable to PT.  RN approval obtained prior to PT entry. Patient on 1L O2 via nasal cannula upon arrival O2 saturation 96-97% at rest.  Patient denies pain, \"just tired. \"   Pain: 0/10  Pain Interventions: not applicable       Functional Mobility    Bed Mobility  Bed mobility not completed on this date. Comments:    Transfers  Sit to stand transfer: stand by assistance  Stand to sit transfer: stand by assistance  Toilet transfer: minimal assistance x 2  Comments: VC for hand placement  Ambulation  Surface:level surface  Assistive Device: rollator (9YIC)  Assistance: contact guard assistance  Distance: 15 ft to and from bathroom  Gait Mechanics: forward flexed posture decreased bob, decreased bilateral step length and height  Comments:  Max VC to stay closer to walker  Stair Mobility  Stair mobility not completed on this date. Comments:  Wheelchair Mobility:  No w/c mobility completed on this date.   Comments    Balance  Static Sitting Balance: good: independent with functional balance in unsupported position  Dynamic Sitting Balance: good: independent with functional balance in unsupported position  Static Standing Balance: fair (-): maintains balance at SBA with use of UE support  Dynamic Standing Balance: fair (-): maintains balance at SBA with use of UE support    Other functional activities:  Assisted patient with toileting and brief management    Functional Outcomes  AM-PAC Inpatient Mobility Raw Score : 18              Cognition  WFL  Orientation:    alert and oriented x 4  Command Following:   [facility]    Education  Barriers To Learning: hearing  Patient Education: patient educated on goals, PT role and
Balance: supervision. Sitting Balance Comment: seated on 4WW  Standing Balance: SBA-CGA (static stand at sink using AD with SBA; dynamic stand with ambulation using AD with CGA)  Functional Mobility: contact guard assistance   See PT for gait analysis  Pt educated on benefits of OOB activity and AROM of BLE's for edema mgmt. Other Therapeutic Interventions      Functional Outcomes  AM-PAC Inpatient Daily Activity Raw Score: 16      Cognition  WFL  Orientation:    alert and oriented x 4  Command Following:   [facility]     Education    Barriers To Learning: hearing  Patient Education: patient educated on goals, OT role and benefits, plan of care, transfer training, discharge recommendations  Learning Assessment:  patient verbalizes understanding, would benefit from continued reinforcement    Assessment    Activity Tolerance: decreased endurance   Impairments Requiring Therapeutic Intervention: decreased functional mobility, decreased ADL status, decreased safety awareness, decreased endurance, decreased balance, decreased IADL  Prognosis: good  Clinical Assessment: Pt presents with the above deficits impacting daily occupational performance and would benefit from continued skilled OT services. Pt with increased fatigue after participating with OT for minimal amount of activity. Pt eager to participate with OT and verbalized understanding of benefits of OOB activity for facilitation of activity tolerance. Continue with present POC, changed recommendation from home alone with services to SNF-- discussed with patient who is in agreement.      Safety Interventions: patient left in chair, chair alarm in place, call light within reach, nurse notified       Plan    Frequency: 3-5 x/per week  Current Treatment Recommendations: strengthening, balance training, functional mobility training, transfer training, endurance training, ADL/self-care training, and IADL training    Goals    Patient Goals: to return home     Short Term
Dinner; Diabetic Oral Supplement  Code Status: Full Code  PT/OT Eval Status: Following    Dispo - Home with home PT/OT    Discussed with patient, nursing and CM. Appreciate Pulm and Cardio input. Might need home O2. Home in next 48 hrs.     Yuan Azar MD
IV   - Continue Duoneb   - Pulm consult appreciated    DVT Prophylaxis: Eliquis  Diet: ADULT DIET; Regular; 4 carb choices (60 gm/meal); Low Sodium (2 gm); 1500 ml  ADULT ORAL NUTRITION SUPPLEMENT; Breakfast, Dinner; Diabetic Oral Supplement  Code Status: Full Code  PT/OT Eval Status: Following    Dispo - Home with home PT/OT    Discussed with patient, nursing and CM. Discussed with Dr Jarrett Dinero [facility]). Continue diuresis. Will see how she does.     Colleen López MD
education  HTN:   ~Stable  ~Plan: continue nifedipine, hydralazine, and toprol  Atrial Fib   ~rate controlled on amio and BB, continue AC     DRU             ~s/p Venofer                      Hyponatremia  ~s/p samscax2  ~improving      All questions and concerns were addressed to the patient. Alternatives to my treatment were discussed. I have discussed the above stated plan with patient and the nurse. The patient verbalized understanding and agreed with the plan.     I appreciate the opportunity of cooperating in the care of this individual.    Lois Barkley, APRN - CNP, ACNP, [address] 3/16/2023, 8:58 AM  Heart Failure  [facility]  [address], [address]  Ph: [phone]
pneumonia. 2.  Mild left basilar airspace disease likely representing atelectasis         CT CHEST PULMONARY EMBOLISM W CONTRAST   Final Result   Negative CTA for pulmonary embolus. Mild bilateral pleural effusions with minimal bibasilar atelectasis with   thickening of the interlobular septa compatible with pulmonary edema. XR CHEST PORTABLE   Final Result   Cardiomegaly and pulmonary venous congestion. Active Hospital Problems    Diagnosis Date Noted    Hyponatremia [E87.1] 03/13/2023     Priority: Medium    Hypertensive urgency [I16.0] 03/09/2023     Priority: Medium    Anemia [D64.9] 03/09/2023     Priority: Medium    Chronic atrial fibrillation ([facility] [address] [I48.20] 03/09/2023     Priority: Medium    Shortness of breath [R06.02] 03/08/2023     Priority: Medium    Acute on chronic diastolic heart failure ([address] [I50.33] 03/06/2023     Priority: Medium    Malignant hypertension [I10] 03/06/2023     Priority: Medium    Pleural effusion [J90] 03/05/2023     Priority: Medium    Paroxysmal atrial fibrillation ([facility] [address] [I48.0]        Ok Segal MD      Please excuse brevity and/or typos. This report was transcribed using voice recognition software. Every effort was made to ensure accuracy, however, inadvertent computerized transcription errors may be present.
understanding    Assessment  Activity Tolerance: good, SpO2 >93% on 2 L of O2  Impairments Requiring Therapeutic Intervention: decreased functional mobility, decreased strength, decreased endurance, decreased balance  Prognosis: good - guarded secondary to medical status   Clinical Assessment:  Pt CGA for transfers and gait with 4ww. Pt. Continues to have deficits in her functional mobility compared to her baseline. Pt would benefit from continued skilled therapy at an inpatient level upon discharge from [address] [place] [address]. Will continue to follow to progress her mobility as pt tolerates. Safety Interventions: patient left in chair, chair alarm in place, call light within reach, gait belt, patient at risk for falls, and nurse notified    Plan  Frequency: 3-5 x/per week  Current Treatment Recommendations: strengthening, balance training, functional mobility training, transfer training, gait training, endurance training, home exercise program, safety education, and equipment evaluation/education    Goals  Patient Goals: \"to go home\"  Short Term Goals:  Time Frame: discharge - all goals ongoing 3/15  Patient will complete bed mobility at modified independent   Patient will complete transfers at modified independent   Patient will ambulate 50 ft with use of rolling walker at modified independent  No goals met in full this treatment, 3/17    Therapy Session Time      Individual Group Co-treatment   Time In 1243       Time Out 1310       Minutes 27           Timed Code Treatment Minutes: 27minutes      If patient discharges prior to next treatment, this note will serve as discharge summary.   Kayy Patel [id number]
with questions or concerns.
03/05/2023 01:14 PM    ALKPHOS 157 03/05/2023 01:14 PM    PROT 7.2 03/05/2023 01:14 PM    AGRATIO 0.9 03/05/2023 01:14 PM    BILITOT 0.3 03/05/2023 01:14 PM     Cardiac Enzymes:   Lab Results   Component Value Date/Time    TROPONINI 0.01 03/05/2023 01:14 PM     Coags:   Lab Results   Component Value Date/Time    PROTIME 13.4 06/10/2020 09:16 AM    INR 1.15 06/10/2020 09:16 AM       ECG: 3/5/23 (Personally Interpreted)   - NSR with 1st degree    ECHO: 3/6/23   *Left ventricle - normal size, thickness and function EF of 65%   *Aortic valve - thickened and calcified, mild stenosis with MG 14mmHg, GLENDA   1.37cm2, trivial regurgitation   *Mitral valve - mild thickening, trivial regurgitation   *Tricuspid valve - moderate regurgitation with PASP of 48mmHg   *Pericardium - trivial effusion    Stress Test: None    CT Chest: 3/5/23  Negative CTA for pulmonary embolus. Mild bilateral pleural effusions with minimal bibasilar atelectasis with   thickening of the interlobular septa compatible with pulmonary edema. Problem List:   Patient Active Problem List    Diagnosis Date Noted    Hyponatremia 03/13/2023    Hypertensive urgency 03/09/2023    Anemia 03/09/2023    Chronic atrial fibrillation ([facility] [address] 03/09/2023    Shortness of breath 03/08/2023    Acute on chronic diastolic heart failure ([facility] [address] 03/06/2023    Malignant hypertension 03/06/2023    Pleural effusion 03/05/2023    Chronic anticoagulation 06/12/2020    Closed fracture of right tibial plateau 94/35/4423    Closed fracture of upper end of right fibula     Fall on stairs     Closed fracture of lateral portion of right tibial plateau 98/26/7767    Paroxysmal atrial fibrillation (HCC)         Assessment and Plan: 1. Acute diastolic heart failure (NYHA Class III)  - Appears compensated   ~ EF 65% per echo  - Continue with BB. Continue with IV lasix  ~ ACE-I/ARB contraindicated due to renal insufficiency and risk of hyperkalemia  - Monitor I&O's, Daily weights    2.
Time Out    1053   Minutes    38        Timed Code Treatment Minutes:  Timed Code Treatment Minutes: 23 Minutes    Total Treatment Minutes:  38 minutes total    Electronically Signed By: Maria Eugenia Castaneda OT, Maria Eugenia Castaneda, RENR/L [id number]
amount of activity. Pt eager to participate with OT and verbalized understanding of benefits of OOB activity for facilitation of activity tolerance. Patient with CGA to Rissa for functional transfers and CGA to Rissa for functional mobility, maxA to SBA for ADLs. Continue with present POC, changed recommendation from home alone with services to SNF-- discussed with patient who is in agreement.      Safety Interventions: patient left in chair, chair alarm in place, call light within reach, nurse notified       Plan    Frequency: 3-5 x/per week  Current Treatment Recommendations: strengthening, balance training, functional mobility training, transfer training, endurance training, patient/caregiver education, ADL/self-care training, IADL training, home management training, and safety education    Goals    Patient Goals: to return home     Short Term Goals:  Time Frame: by d/c  Patient will complete upper body ADL at modified independent   Patient will complete lower body ADL at modified independent   Patient will complete toileting at modified independent   Patient will complete functional transfers at modified independent   Patient will complete functional mobility at modified independent     NO GOALS MET THIS DATE 3/17, 3/21        Therapy Session Time     Individual Group Co-treatment   Time In   1040   Time Out   1133   Minutes   53         Timed Code Treatment Minutes:  53 minutes    Total Treatment Minutes: 53 minutes total    Electronically Signed By: LORENZO Gilbert/SHAYE [id number]
training, functional mobility training, transfer training, endurance training, ADL/self-care training, and IADL training    Goals    Patient Goals: to return home     Short Term Goals:  Time Frame: by d/c  Patient will complete upper body ADL at modified independent   Patient will complete lower body ADL at modified independent   Patient will complete toileting at modified independent   Patient will complete functional transfers at modified independent   Patient will complete functional mobility at modified independent     NO GOALS MET THIS DATE 3/14/2023. Therapy Session Time     Individual Group Co-treatment   Time In 1003     Time Out 1041     Minutes 38          Timed Code Treatment Minutes:  38 minutes    Total Treatment Minutes:  38 minutes total    Electronically Signed By: Khushi Marie, [address], OTR/L [id number].
Patient will complete functional mobility at modified independent     NO GOALS MET THIS DATE 3/15/23, 3/16/23        Therapy Session Time     Individual Group Co-treatment   Time In 0835     Time Out 0905     Minutes 30           Timed Code Treatment Minutes:  30 minutes    Total Treatment Minutes:  30 minutes total    Electronically Signed By: LORENZO Kowalski/SHAYE [id number]

## 2023-03-24 NOTE — PROGRESS NOTES
Heart sounds: Normal heart sounds. Pulmonary:      Effort: Pulmonary effort is normal.      Breath sounds: Normal breath sounds. Abdominal:      Palpations: Abdomen is soft. Musculoskeletal:         General: Normal range of motion. Cervical back: Normal range of motion and neck supple. Skin:     General: Skin is warm and dry. Neurological:      General: No focal deficit present. Mental Status: She is alert and oriented to person, place, and time. Mental status is at baseline. Psychiatric:         Mood and Affect: Mood normal.         Behavior: Behavior normal.         Thought Content:  Thought content normal.       Lab Data:    CBC:   Lab Results   Component Value Date/Time    WBC 7.1 03/21/2023 04:53 AM    WBC 7.4 03/20/2023 04:42 AM    WBC 7.9 03/19/2023 04:50 AM    RBC 3.54 03/21/2023 04:53 AM    RBC 3.59 03/20/2023 04:42 AM    RBC 3.47 03/19/2023 04:50 AM    HGB 9.1 03/21/2023 04:53 AM    HGB 9.3 03/20/2023 04:42 AM    HGB 9.0 03/19/2023 04:50 AM    HCT 28.6 03/21/2023 04:53 AM    HCT 29.1 03/20/2023 04:42 AM    HCT 28.2 03/19/2023 04:50 AM    MCV 80.7 03/21/2023 04:53 AM    MCV 81.0 03/20/2023 04:42 AM    MCV 81.2 03/19/2023 04:50 AM    RDW 25.8 03/21/2023 04:53 AM    RDW 25.4 03/20/2023 04:42 AM    RDW 24.8 03/19/2023 04:50 AM     03/21/2023 04:53 AM     03/20/2023 04:42 AM     03/19/2023 04:50 AM     BMP:  Lab Results   Component Value Date/Time     03/21/2023 04:53 AM     03/20/2023 04:42 AM     03/19/2023 04:50 AM    K 3.3 03/21/2023 04:53 AM    K 3.5 03/20/2023 04:42 AM    K 3.3 03/19/2023 04:50 AM    K 5.0 06/26/2020 06:46 AM    K 4.3 06/11/2020 05:45 AM    K 4.4 06/10/2020 12:23 AM    CL 94 03/21/2023 04:53 AM    CL 94 03/20/2023 04:42 AM    CL 96 03/19/2023 04:50 AM    CO2 32 03/21/2023 04:53 AM    CO2 30 03/20/2023 04:42 AM    CO2 28 03/19/2023 04:50 AM    BUN 26 03/21/2023 04:53 AM    BUN 30 03/20/2023 04:42 AM    BUN 32 03/19/2023 04:50 AM

## 2023-03-26 NOTE — DISCHARGE SUMMARY
times daily, Disp-1 each, R-1Normal                Details   levothyroxine (SYNTHROID) 100 MCG tablet Take 100 mcg by mouth DailyHistorical Med      traZODone (DESYREL) 50 MG tablet Take 50 mg by mouth nightlyHistorical Med      cetirizine (ZYRTEC) 10 MG tablet Take 10 mg by mouth as needed for AllergiesHistorical Med      albuterol sulfate HFA (PROVENTIL;VENTOLIN;PROAIR) 108 (90 Base) MCG/ACT inhaler Inhale 2 puffs into the lungsHistorical Med      aspirin 81 MG EC tablet Take 81 mg by mouth dailyHistorical Med      amiodarone (CORDARONE) 200 MG tablet Take 200 mg by mouth 2 times dailyHistorical Med      apixaban (ELIQUIS) 2.5 MG TABS tablet Take 5 mg by mouth 2 times daily Patient does not know dose Historical Med      linagliptin (TRADJENTA) 5 MG tablet Take 5 mg by mouth dailyHistorical Med      canagliflozin (INVOKANA) 100 MG TABS tablet Take 100 mg by mouth 2 times dailyHistorical Med      atorvastatin (LIPITOR) 40 MG tablet Take 40 mg by mouth dailyHistorical Med      latanoprost (XALATAN) 0.005 % ophthalmic solution Place 1 drop into both eyes nightlyHistorical Med           The patient was seen and examined on day of discharge and this discharge summary is in conjunction with any daily progress note from day of discharge. Time Spent on discharge is 45 minutes  in the examination, evaluation, counseling and review of medications and discharge plan. Note that greater  than 30 minutes was spent in preparing discharge papers, discussing discharge with patient, medication review, etc.     Signed:    Audelia Donohue MD   3/26/2023      Thank you Leonid Robin MD for the opportunity to be involved in this patient's care. If you have any questions or concerns please feel free to contact me at [phone].

## 2023-03-27 ENCOUNTER — OFFICE VISIT (OUTPATIENT)
Dept: CARDIOLOGY CLINIC | Age: 88
End: 2023-03-27
Payer: MEDICARE

## 2023-03-27 VITALS
DIASTOLIC BLOOD PRESSURE: 50 MMHG | BODY MASS INDEX: 24.8 KG/M2 | OXYGEN SATURATION: 92 % | HEIGHT: 63 IN | WEIGHT: 140 LBS | SYSTOLIC BLOOD PRESSURE: 138 MMHG | HEART RATE: 49 BPM

## 2023-03-27 DIAGNOSIS — I50.33 ACUTE ON CHRONIC DIASTOLIC HEART FAILURE (HCC): Primary | ICD-10-CM

## 2023-03-27 DIAGNOSIS — Z09 HOSPITAL DISCHARGE FOLLOW-UP: ICD-10-CM

## 2023-03-27 DIAGNOSIS — I48.0 PAROXYSMAL ATRIAL FIBRILLATION (HCC): ICD-10-CM

## 2023-03-27 DIAGNOSIS — I10 MALIGNANT HYPERTENSION: ICD-10-CM

## 2023-03-27 DIAGNOSIS — R06.02 SHORTNESS OF BREATH: ICD-10-CM

## 2023-03-27 PROCEDURE — G8427 DOCREV CUR MEDS BY ELIG CLIN: HCPCS | Performed by: NURSE PRACTITIONER

## 2023-03-27 PROCEDURE — G8420 CALC BMI NORM PARAMETERS: HCPCS | Performed by: NURSE PRACTITIONER

## 2023-03-27 PROCEDURE — 1036F TOBACCO NON-USER: CPT | Performed by: NURSE PRACTITIONER

## 2023-03-27 PROCEDURE — 99214 OFFICE O/P EST MOD 30 MIN: CPT | Performed by: NURSE PRACTITIONER

## 2023-03-27 PROCEDURE — 1090F PRES/ABSN URINE INCON ASSESS: CPT | Performed by: NURSE PRACTITIONER

## 2023-03-27 PROCEDURE — 1111F DSCHRG MED/CURRENT MED MERGE: CPT | Performed by: NURSE PRACTITIONER

## 2023-03-27 PROCEDURE — 1123F ACP DISCUSS/DSCN MKR DOCD: CPT | Performed by: NURSE PRACTITIONER

## 2023-03-27 PROCEDURE — G8484 FLU IMMUNIZE NO ADMIN: HCPCS | Performed by: NURSE PRACTITIONER

## 2023-03-27 RX ORDER — CARVEDILOL 12.5 MG/1
6.25 TABLET ORAL 2 TIMES DAILY WITH MEALS
Qty: 60 TABLET | Refills: 3 | Status: SHIPPED | OUTPATIENT
Start: 2023-03-27

## 2023-03-27 RX ORDER — INSULIN GLARGINE 100 [IU]/ML
5 INJECTION, SOLUTION SUBCUTANEOUS NIGHTLY
COMMUNITY

## 2023-03-27 RX ORDER — POLYETHYLENE GLYCOL 3350 17 G/17G
17 POWDER, FOR SOLUTION ORAL DAILY PRN
COMMUNITY

## 2023-03-27 RX ORDER — GUAIFENESIN 600 MG/1
1200 TABLET, EXTENDED RELEASE ORAL 2 TIMES DAILY
COMMUNITY

## 2023-03-27 RX ORDER — AMLODIPINE BESYLATE 10 MG/1
10 TABLET ORAL DAILY
COMMUNITY
End: 2023-03-27 | Stop reason: ALTCHOICE

## 2023-03-27 RX ORDER — AMIODARONE HYDROCHLORIDE 200 MG/1
100 TABLET ORAL DAILY
Status: SHIPPED | COMMUNITY
Start: 2023-03-27

## 2023-03-27 ASSESSMENT — ENCOUNTER SYMPTOMS
RESPIRATORY NEGATIVE: 1
GASTROINTESTINAL NEGATIVE: 1

## 2023-03-27 NOTE — PATIENT INSTRUCTIONS
CHF Orders:     Daily Weights-Document and send in with patient to office visits       Please call CHF office for  weight gain of 3 pounds in one day OR 5 pound weight gain in one week       Diet:   No salt added and 64 oz fluid restriction  No potassium based salt substitutes      Labs:   BMP,BNP weekly                 Fax results to: CHF Clinic: [phone]      Med Changes: decrease amiodarone to 100mg once a day, decrease carvedilol to 6.25mg twice a day   Hold Coreg if HR less than 45 or patient symptomatic   Avoid NSAIDS and decongestants   Call MD/NP for questions: CHF Clinic: [phone]

## 2023-04-05 PROBLEM — I50.33 ACUTE ON CHRONIC DIASTOLIC HEART FAILURE (HCC): Status: RESOLVED | Noted: 2023-03-06 | Resolved: 2023-04-05

## 2023-04-05 PROBLEM — I16.0 HYPERTENSIVE URGENCY: Status: RESOLVED | Noted: 2023-03-09 | Resolved: 2023-04-05

## 2023-04-07 ENCOUNTER — TELEPHONE (OUTPATIENT)
Dept: CARDIOLOGY CLINIC | Age: 88
End: 2023-04-07

## 2023-04-07 NOTE — TELEPHONE ENCOUNTER
Per Byron Ferro last OV note 03-    Instructions:   Medications:decrease coreg to 6.25mg bid and amio to 100mg/day  Labs: weeklyx4  Lifestyle Recommendations: Weigh yourself every day in the morning after urination, call Michael Camarena if wt increases 2-3lb in one day or 5lb in one week, Limit sodium to 3000mg/day and fluids to 2L or 64oz/day. Follow up: EP in 4 weeks    Tried to reach Jo-Ann Wang for them to return our call with any questions or concerns. Spoke to patient's son lorri he verbalized understanding.